# Patient Record
Sex: FEMALE | Race: WHITE | Employment: UNEMPLOYED | URBAN - METROPOLITAN AREA
[De-identification: names, ages, dates, MRNs, and addresses within clinical notes are randomized per-mention and may not be internally consistent; named-entity substitution may affect disease eponyms.]

---

## 2017-01-31 ENCOUNTER — HOSPITAL ENCOUNTER (EMERGENCY)
Age: 37
Discharge: HOME | End: 2017-01-31
Attending: EMERGENCY MEDICINE
Payer: COMMERCIAL

## 2017-01-31 VITALS
OXYGEN SATURATION: 100 % | RESPIRATION RATE: 16 BRPM | WEIGHT: 180 LBS | HEART RATE: 84 BPM | SYSTOLIC BLOOD PRESSURE: 121 MMHG | DIASTOLIC BLOOD PRESSURE: 72 MMHG | TEMPERATURE: 98 F | HEIGHT: 64 IN | BODY MASS INDEX: 30.73 KG/M2

## 2017-01-31 DIAGNOSIS — R10.13 ABDOMINAL PAIN, EPIGASTRIC: Primary | ICD-10-CM

## 2017-01-31 LAB
ALBUMIN SERPL BCP-MCNC: 3.1 G/DL (ref 3.5–5)
ALBUMIN/GLOB SERPL: 0.8 {RATIO} (ref 1.2–3.5)
ALP SERPL-CCNC: 63 U/L (ref 50–136)
ALT SERPL-CCNC: 17 U/L (ref 12–65)
ANION GAP BLD CALC-SCNC: 5 MMOL/L (ref 7–16)
AST SERPL W P-5'-P-CCNC: 17 U/L (ref 15–37)
BASOPHILS # BLD AUTO: 0 K/UL (ref 0–0.2)
BASOPHILS # BLD: 0 % (ref 0–2)
BILIRUB SERPL-MCNC: 0.2 MG/DL (ref 0.2–1.1)
BUN SERPL-MCNC: 7 MG/DL (ref 6–23)
CALCIUM SERPL-MCNC: 9 MG/DL (ref 8.3–10.4)
CHLORIDE SERPL-SCNC: 102 MMOL/L (ref 98–107)
CO2 SERPL-SCNC: 32 MMOL/L (ref 21–32)
CREAT SERPL-MCNC: 0.8 MG/DL (ref 0.6–1)
DIFFERENTIAL METHOD BLD: ABNORMAL
EOSINOPHIL # BLD: 0.1 K/UL (ref 0–0.8)
EOSINOPHIL NFR BLD: 1 % (ref 0.5–7.8)
ERYTHROCYTE [DISTWIDTH] IN BLOOD BY AUTOMATED COUNT: 14 % (ref 11.9–14.6)
GLOBULIN SER CALC-MCNC: 3.8 G/DL (ref 2.3–3.5)
GLUCOSE SERPL-MCNC: 79 MG/DL (ref 65–100)
HCT VFR BLD AUTO: 38.2 % (ref 35.8–46.3)
HGB BLD-MCNC: 11.8 G/DL (ref 11.7–15.4)
IMM GRANULOCYTES # BLD: 0 K/UL (ref 0–0.5)
IMM GRANULOCYTES NFR BLD AUTO: 0.2 % (ref 0–5)
LIPASE SERPL-CCNC: 66 U/L (ref 73–393)
LYMPHOCYTES # BLD AUTO: 28 % (ref 13–44)
LYMPHOCYTES # BLD: 2.9 K/UL (ref 0.5–4.6)
MCH RBC QN AUTO: 27.2 PG (ref 26.1–32.9)
MCHC RBC AUTO-ENTMCNC: 30.9 G/DL (ref 31.4–35)
MCV RBC AUTO: 88 FL (ref 79.6–97.8)
MONOCYTES # BLD: 0.6 K/UL (ref 0.1–1.3)
MONOCYTES NFR BLD AUTO: 6 % (ref 4–12)
NEUTS SEG # BLD: 6.8 K/UL (ref 1.7–8.2)
NEUTS SEG NFR BLD AUTO: 65 % (ref 43–78)
PLATELET # BLD AUTO: 270 K/UL (ref 150–450)
PMV BLD AUTO: 9.8 FL (ref 10.8–14.1)
POTASSIUM SERPL-SCNC: 3.5 MMOL/L (ref 3.5–5.1)
PROT SERPL-MCNC: 6.9 G/DL (ref 6.3–8.2)
RBC # BLD AUTO: 4.34 M/UL (ref 4.05–5.25)
SODIUM SERPL-SCNC: 139 MMOL/L (ref 136–145)
WBC # BLD AUTO: 10.5 K/UL (ref 4.3–11.1)

## 2017-01-31 PROCEDURE — 83690 ASSAY OF LIPASE: CPT | Performed by: EMERGENCY MEDICINE

## 2017-01-31 PROCEDURE — 96361 HYDRATE IV INFUSION ADD-ON: CPT | Performed by: EMERGENCY MEDICINE

## 2017-01-31 PROCEDURE — 81003 URINALYSIS AUTO W/O SCOPE: CPT | Performed by: EMERGENCY MEDICINE

## 2017-01-31 PROCEDURE — 99284 EMERGENCY DEPT VISIT MOD MDM: CPT | Performed by: EMERGENCY MEDICINE

## 2017-01-31 PROCEDURE — 74011250637 HC RX REV CODE- 250/637: Performed by: EMERGENCY MEDICINE

## 2017-01-31 PROCEDURE — 74011250636 HC RX REV CODE- 250/636: Performed by: EMERGENCY MEDICINE

## 2017-01-31 PROCEDURE — 85025 COMPLETE CBC W/AUTO DIFF WBC: CPT | Performed by: EMERGENCY MEDICINE

## 2017-01-31 PROCEDURE — 96374 THER/PROPH/DIAG INJ IV PUSH: CPT | Performed by: EMERGENCY MEDICINE

## 2017-01-31 PROCEDURE — 80053 COMPREHEN METABOLIC PANEL: CPT | Performed by: EMERGENCY MEDICINE

## 2017-01-31 PROCEDURE — 74011250636 HC RX REV CODE- 250/636

## 2017-01-31 PROCEDURE — 96376 TX/PRO/DX INJ SAME DRUG ADON: CPT | Performed by: EMERGENCY MEDICINE

## 2017-01-31 PROCEDURE — 96375 TX/PRO/DX INJ NEW DRUG ADDON: CPT | Performed by: EMERGENCY MEDICINE

## 2017-01-31 RX ORDER — HYDROMORPHONE HYDROCHLORIDE 1 MG/ML
1 INJECTION, SOLUTION INTRAMUSCULAR; INTRAVENOUS; SUBCUTANEOUS
Status: COMPLETED | OUTPATIENT
Start: 2017-01-31 | End: 2017-01-31

## 2017-01-31 RX ORDER — SODIUM CHLORIDE 9 MG/ML
1000 INJECTION, SOLUTION INTRAVENOUS ONCE
Status: COMPLETED | OUTPATIENT
Start: 2017-01-31 | End: 2017-01-31

## 2017-01-31 RX ORDER — ONDANSETRON 2 MG/ML
4 INJECTION INTRAMUSCULAR; INTRAVENOUS
Status: COMPLETED | OUTPATIENT
Start: 2017-01-31 | End: 2017-01-31

## 2017-01-31 RX ORDER — HYDROMORPHONE HYDROCHLORIDE 1 MG/ML
INJECTION, SOLUTION INTRAMUSCULAR; INTRAVENOUS; SUBCUTANEOUS
Status: DISCONTINUED
Start: 2017-01-31 | End: 2017-01-31 | Stop reason: HOSPADM

## 2017-01-31 RX ORDER — SODIUM CHLORIDE 0.9 % (FLUSH) 0.9 %
5-10 SYRINGE (ML) INJECTION EVERY 8 HOURS
Status: DISCONTINUED | OUTPATIENT
Start: 2017-01-31 | End: 2017-01-31 | Stop reason: HOSPADM

## 2017-01-31 RX ORDER — ONDANSETRON 8 MG/1
TABLET, ORALLY DISINTEGRATING ORAL
Status: DISCONTINUED
Start: 2017-01-31 | End: 2017-01-31 | Stop reason: HOSPADM

## 2017-01-31 RX ORDER — POTASSIUM CITRATE 15 MEQ/1
15 TABLET, EXTENDED RELEASE ORAL 2 TIMES DAILY
COMMUNITY

## 2017-01-31 RX ORDER — ONDANSETRON 8 MG/1
8 TABLET, ORALLY DISINTEGRATING ORAL
Status: COMPLETED | OUTPATIENT
Start: 2017-01-31 | End: 2017-01-31

## 2017-01-31 RX ORDER — HYDROMORPHONE HYDROCHLORIDE 1 MG/ML
2 INJECTION, SOLUTION INTRAMUSCULAR; INTRAVENOUS; SUBCUTANEOUS
Status: COMPLETED | OUTPATIENT
Start: 2017-01-31 | End: 2017-01-31

## 2017-01-31 RX ORDER — SODIUM CHLORIDE 0.9 % (FLUSH) 0.9 %
5-10 SYRINGE (ML) INJECTION AS NEEDED
Status: DISCONTINUED | OUTPATIENT
Start: 2017-01-31 | End: 2017-01-31 | Stop reason: HOSPADM

## 2017-01-31 RX ORDER — IBUPROFEN 200 MG
200 CAPSULE ORAL 3 TIMES DAILY
COMMUNITY

## 2017-01-31 RX ADMIN — SODIUM CHLORIDE 1000 ML: 900 INJECTION, SOLUTION INTRAVENOUS at 13:30

## 2017-01-31 RX ADMIN — ONDANSETRON 8 MG: 8 TABLET, ORALLY DISINTEGRATING ORAL at 15:50

## 2017-01-31 RX ADMIN — HYDROMORPHONE HYDROCHLORIDE 1 MG: 1 INJECTION, SOLUTION INTRAMUSCULAR; INTRAVENOUS; SUBCUTANEOUS at 15:50

## 2017-01-31 RX ADMIN — HYDROMORPHONE HYDROCHLORIDE 2 MG: 1 INJECTION, SOLUTION INTRAMUSCULAR; INTRAVENOUS; SUBCUTANEOUS at 13:30

## 2017-01-31 RX ADMIN — ONDANSETRON 4 MG: 2 INJECTION INTRAMUSCULAR; INTRAVENOUS at 13:30

## 2017-01-31 NOTE — DISCHARGE INSTRUCTIONS
Abdominal Pain: Care Instructions  Your Care Instructions    Abdominal pain has many possible causes. Some aren't serious and get better on their own in a few days. Others need more testing and treatment. If your pain continues or gets worse, you need to be rechecked and may need more tests to find out what is wrong. You may need surgery to correct the problem. Don't ignore new symptoms, such as fever, nausea and vomiting, urination problems, pain that gets worse, and dizziness. These may be signs of a more serious problem. Your doctor may have recommended a follow-up visit in the next 8 to 12 hours. If you are not getting better, you may need more tests or treatment. The doctor has checked you carefully, but problems can develop later. If you notice any problems or new symptoms, get medical treatment right away. Follow-up care is a key part of your treatment and safety. Be sure to make and go to all appointments, and call your doctor if you are having problems. It's also a good idea to know your test results and keep a list of the medicines you take. How can you care for yourself at home? · Rest until you feel better. · To prevent dehydration, drink plenty of fluids, enough so that your urine is light yellow or clear like water. Choose water and other caffeine-free clear liquids until you feel better. If you have kidney, heart, or liver disease and have to limit fluids, talk with your doctor before you increase the amount of fluids you drink. · If your stomach is upset, eat mild foods, such as rice, dry toast or crackers, bananas, and applesauce. Try eating several small meals instead of two or three large ones. · Wait until 48 hours after all symptoms have gone away before you have spicy foods, alcohol, and drinks that contain caffeine. · Do not eat foods that are high in fat. · Avoid anti-inflammatory medicines such as aspirin, ibuprofen (Advil, Motrin), and naproxen (Aleve).  These can cause stomach upset. Talk to your doctor if you take daily aspirin for another health problem. When should you call for help? Call 911 anytime you think you may need emergency care. For example, call if:  · You passed out (lost consciousness). · You pass maroon or very bloody stools. · You vomit blood or what looks like coffee grounds. · You have new, severe belly pain. Call your doctor now or seek immediate medical care if:  · Your pain gets worse, especially if it becomes focused in one area of your belly. · You have a new or higher fever. · Your stools are black and look like tar, or they have streaks of blood. · You have unexpected vaginal bleeding. · You have symptoms of a urinary tract infection. These may include:  ¨ Pain when you urinate. ¨ Urinating more often than usual.  ¨ Blood in your urine. · You are dizzy or lightheaded, or you feel like you may faint. Watch closely for changes in your health, and be sure to contact your doctor if:  · You are not getting better after 1 day (24 hours). Where can you learn more? Go to http://fidelLudesijill.info/. Enter R831 in the search box to learn more about \"Abdominal Pain: Care Instructions. \"  Current as of: May 27, 2016  Content Version: 11.1  © 0726-9791 [employer]. Care instructions adapted under license by [employer] (which disclaims liability or warranty for this information). If you have questions about a medical condition or this instruction, always ask your healthcare professional. [place] [address] any warranty or liability for your use of this information.   Recent Results (from the past 8 hour(s))   CBC WITH AUTOMATED DIFF    Collection Time: 01/31/17 12:40 PM   Result Value Ref Range    WBC 10.5 4.3 - 11.1 K/uL    RBC 4.34 4.05 - 5.25 M/uL    HGB 11.8 11.7 - 15.4 g/dL    HCT 38.2 35.8 - 46.3 %    MCV 88.0 79.6 - 97.8 FL    MCH 27.2 26.1 - 32.9 PG    MCHC 30.9 (L) 31.4 - 35.0 g/dL RDW 14.0 11.9 - 14.6 %    PLATELET 418 603 - 700 K/uL    MPV 9.8 (L) 10.8 - 14.1 FL    DF AUTOMATED      NEUTROPHILS 65 43 - 78 %    LYMPHOCYTES 28 13 - 44 %    MONOCYTES 6 4.0 - 12.0 %    EOSINOPHILS 1 0.5 - 7.8 %    BASOPHILS 0 0.0 - 2.0 %    IMMATURE GRANULOCYTES 0.2 0.0 - 5.0 %    ABS. NEUTROPHILS 6.8 1.7 - 8.2 K/UL    ABS. LYMPHOCYTES 2.9 0.5 - 4.6 K/UL    ABS. MONOCYTES 0.6 0.1 - 1.3 K/UL    ABS. EOSINOPHILS 0.1 0.0 - 0.8 K/UL    ABS. BASOPHILS 0.0 0.0 - 0.2 K/UL    ABS. IMM. GRANS. 0.0 0.0 - 0.5 K/UL   METABOLIC PANEL, COMPREHENSIVE    Collection Time: 01/31/17 12:40 PM   Result Value Ref Range    Sodium 139 136 - 145 mmol/L    Potassium 3.5 3.5 - 5.1 mmol/L    Chloride 102 98 - 107 mmol/L    CO2 32 21 - 32 mmol/L    Anion gap 5 (L) 7 - 16 mmol/L    Glucose 79 65 - 100 mg/dL    BUN 7 6 - 23 MG/DL    Creatinine 0.80 0.6 - 1.0 MG/DL    GFR est AA >60 >60 ml/min/1.73m2    GFR est non-AA >60 >60 ml/min/1.73m2    Calcium 9.0 8.3 - 10.4 MG/DL    Bilirubin, total 0.2 0.2 - 1.1 MG/DL    ALT 17 12 - 65 U/L    AST 17 15 - 37 U/L    Alk.  phosphatase 63 50 - 136 U/L    Protein, total 6.9 6.3 - 8.2 g/dL    Albumin 3.1 (L) 3.5 - 5.0 g/dL    Globulin 3.8 (H) 2.3 - 3.5 g/dL    A-G Ratio 0.8 (L) 1.2 - 3.5     LIPASE    Collection Time: 01/31/17 12:40 PM   Result Value Ref Range    Lipase 66 (L) 73 - 393 U/L

## 2017-01-31 NOTE — ED TRIAGE NOTES
Pt in c/o right upper quadrant pain x 4 days. Pt states history of chronic pancreatitis. Pt states vomiting and diarrhea. States chills. States unable to take pain medications at home due to vomiting.

## 2017-01-31 NOTE — ED PROVIDER NOTES
HPI Comments: 40 yo female presents with several days of abd pain, nausea/vomiting  Unable to keep down liquids  Unable to keep down her pain medicine    No alleviating  Getting worse with time    No fever    +n/v/vdiarrhea    Hx of chronic pain  Hx of chronic pancreatitis/abd pain    Patient is a 39 y.o. female presenting with abdominal pain. Abdominal Pain    Associated symptoms include diarrhea, nausea and vomiting. Past Medical History:   Diagnosis Date    Asthma      uses MDI as needed - rarely-pt can't remember last time needed    Avascular necrosis of femur head      right hip, left hip replacements    Chronic pain     Chronic pancreatitis ([facility] [address] 12/10/2011    Endocrine disease diagnosed 2003     ADDISONS DISEASE    Endometriosis      hysterectomy    Infectious disease 2007     MRSA-2007, community acquired - in [facility]) about 6 months - 3 months of which was in coma;  HPV-2009     Other ill-defined conditions(799.89)      allergies    Pancreatitis chronic      7 episodes    Unspecified adverse effect of anesthesia      pt reports \"difficult to sedate       Past Surgical History:   Procedure Laterality Date    Hx gyn       hysterectomy    Hx appendectomy      Hx cholecystectomy      Hx tonsillectomy      Hx other surgical  2007     I&D of 9 abcesses -MRSA    Hx orthopaedic  2010     right EDIN    Hx orthopaedic  2010     Left  EDIN    Hx shoulder replacement  12/2011     Right    Hx shoulder replacement  2015     left         Family History:   Problem Relation Age of Onset    Diabetes Maternal Grandmother     Stroke Maternal Grandfather     Heart Disease Paternal Grandmother        Social History     Social History    Marital status: SINGLE     Spouse name: N/A    Number of children: N/A    Years of education: N/A     Occupational History    Not on file.      Social History Main Topics    Smoking status: Never Smoker    Smokeless tobacco: Never Used    Alcohol use No    Drug use: No    Sexual activity: Not Currently     Other Topics Concern    Not on file     Social History Narrative         ALLERGIES: Latex; Peanut; Shellfish containing products; Beef containing products; Chicken derived; Chlorpromazine; Clindamycin; Dopamine receptor agonist; Gluten; Hydroxyzine pamoate; Ibuprofen; Iodine; Naproxen sodium; Nubain [nalbuphine]; Other medication; Pcn [penicillins]; Povidone-iodine; Prochlorperazine edisylate; Promethazine; Reglan [metoclopramide]; Soy; Sulfa (sulfonamide antibiotics); Toradol [ketorolac tromethamine]; Tramadol; and Zithromax [azithromycin]    Review of Systems   Constitutional: Negative. Respiratory: Negative. Negative for chest tightness. Gastrointestinal: Positive for abdominal pain, diarrhea, nausea and vomiting. Genitourinary: Negative. Musculoskeletal: Negative. Skin: Negative. Neurological: Negative. Psychiatric/Behavioral: Negative. Vitals:    01/31/17 1021   BP: 114/82   Pulse: 96   Resp: 16   Temp: 98.3 °F (36.8 °C)   SpO2: 98%   Weight: 81.6 kg (180 lb)   Height: 5' 4\" (1.626 m)            Physical Exam   Constitutional: She is oriented to person, place, and time. She appears well-developed and well-nourished. HENT:   Head: Normocephalic and atraumatic. Eyes: EOM are normal. Pupils are equal, round, and reactive to light. Cardiovascular: Normal rate and regular rhythm. Pulmonary/Chest: Breath sounds normal. No respiratory distress. She has no wheezes. Abdominal: Soft. She exhibits no distension. There is tenderness. There is no rebound and no guarding. Musculoskeletal: Normal range of motion. She exhibits no edema or tenderness. Neurological: She is alert and oriented to person, place, and time. Skin: Skin is warm and dry. Psychiatric: She has a normal mood and affect. Her behavior is normal.   Nursing note and vitals reviewed.        MDM  Number of Diagnoses or Management Options  Diagnosis management comments: 58-year-old female with history of chronic abdominal pain/chronic pancreatitis presents to the emergency Department nausea vomiting diarrhea. Patient's been able to keep her OxyContin and Dilaudid down having increasing pain    We'll give her some IV fluids. Pain medications. Observe.   Discharge           Amount and/or Complexity of Data Reviewed  Clinical lab tests: ordered    Risk of Complications, Morbidity, and/or Mortality  Presenting problems: moderate  Diagnostic procedures: minimal  Management options: high      ED Course   Comment By Time   Reviewed chart  Reviewed [place] records Cipriano Cardenas MD 01/31 1046       Procedures

## 2017-02-22 ENCOUNTER — SURGERY (OUTPATIENT)
Age: 37
End: 2017-02-22

## 2017-02-22 ENCOUNTER — HOSPITAL ENCOUNTER (OUTPATIENT)
Age: 37
Setting detail: OUTPATIENT SURGERY
Discharge: HOME | End: 2017-02-22
Attending: INTERNAL MEDICINE | Admitting: INTERNAL MEDICINE
Payer: COMMERCIAL

## 2017-02-22 ENCOUNTER — ANESTHESIA (OUTPATIENT)
Dept: ENDOSCOPY | Age: 37
End: 2017-02-22
Payer: COMMERCIAL

## 2017-02-22 ENCOUNTER — ANESTHESIA EVENT (OUTPATIENT)
Dept: ENDOSCOPY | Age: 37
End: 2017-02-22
Payer: COMMERCIAL

## 2017-02-22 VITALS
HEART RATE: 75 BPM | TEMPERATURE: 98.6 F | HEIGHT: 64 IN | RESPIRATION RATE: 16 BRPM | SYSTOLIC BLOOD PRESSURE: 115 MMHG | BODY MASS INDEX: 31.58 KG/M2 | DIASTOLIC BLOOD PRESSURE: 74 MMHG | WEIGHT: 185 LBS | OXYGEN SATURATION: 98 %

## 2017-02-22 PROCEDURE — 74011250636 HC RX REV CODE- 250/636: Performed by: ANESTHESIOLOGY

## 2017-02-22 PROCEDURE — 76060000031 HC ANESTHESIA FIRST 0.5 HR: Performed by: INTERNAL MEDICINE

## 2017-02-22 PROCEDURE — 76040000025: Performed by: INTERNAL MEDICINE

## 2017-02-22 PROCEDURE — 77030009426 HC FCPS BIOP ENDOSC BSC -B: Performed by: INTERNAL MEDICINE

## 2017-02-22 PROCEDURE — 74011250636 HC RX REV CODE- 250/636: Performed by: INTERNAL MEDICINE

## 2017-02-22 PROCEDURE — 74011250636 HC RX REV CODE- 250/636

## 2017-02-22 PROCEDURE — 88305 TISSUE EXAM BY PATHOLOGIST: CPT | Performed by: INTERNAL MEDICINE

## 2017-02-22 PROCEDURE — 88312 SPECIAL STAINS GROUP 1: CPT | Performed by: INTERNAL MEDICINE

## 2017-02-22 RX ORDER — HYDROMORPHONE HYDROCHLORIDE 1 MG/ML
2 INJECTION, SOLUTION INTRAMUSCULAR; INTRAVENOUS; SUBCUTANEOUS ONCE
Status: DISCONTINUED | OUTPATIENT
Start: 2017-02-22 | End: 2017-02-22 | Stop reason: HOSPADM

## 2017-02-22 RX ORDER — PROPOFOL 10 MG/ML
INJECTION, EMULSION INTRAVENOUS AS NEEDED
Status: DISCONTINUED | OUTPATIENT
Start: 2017-02-22 | End: 2017-02-22 | Stop reason: HOSPADM

## 2017-02-22 RX ORDER — SODIUM CHLORIDE, SODIUM LACTATE, POTASSIUM CHLORIDE, CALCIUM CHLORIDE 600; 310; 30; 20 MG/100ML; MG/100ML; MG/100ML; MG/100ML
100 INJECTION, SOLUTION INTRAVENOUS CONTINUOUS
Status: DISCONTINUED | OUTPATIENT
Start: 2017-02-22 | End: 2017-02-22 | Stop reason: HOSPADM

## 2017-02-22 RX ORDER — PROPOFOL 10 MG/ML
INJECTION, EMULSION INTRAVENOUS
Status: DISCONTINUED | OUTPATIENT
Start: 2017-02-22 | End: 2017-02-22 | Stop reason: HOSPADM

## 2017-02-22 RX ORDER — SODIUM CHLORIDE, SODIUM LACTATE, POTASSIUM CHLORIDE, CALCIUM CHLORIDE 600; 310; 30; 20 MG/100ML; MG/100ML; MG/100ML; MG/100ML
1000 INJECTION, SOLUTION INTRAVENOUS CONTINUOUS
Status: DISCONTINUED | OUTPATIENT
Start: 2017-02-22 | End: 2017-02-22 | Stop reason: HOSPADM

## 2017-02-22 RX ORDER — HYDROMORPHONE HYDROCHLORIDE 1 MG/ML
INJECTION, SOLUTION INTRAMUSCULAR; INTRAVENOUS; SUBCUTANEOUS
Status: COMPLETED
Start: 2017-02-22 | End: 2017-02-22

## 2017-02-22 RX ORDER — ONDANSETRON 2 MG/ML
4 INJECTION INTRAMUSCULAR; INTRAVENOUS ONCE
Status: COMPLETED | OUTPATIENT
Start: 2017-02-22 | End: 2017-02-22

## 2017-02-22 RX ORDER — OXYCODONE HYDROCHLORIDE 5 MG/1
5 TABLET ORAL
Status: CANCELLED | OUTPATIENT
Start: 2017-02-22

## 2017-02-22 RX ORDER — SODIUM CHLORIDE, SODIUM LACTATE, POTASSIUM CHLORIDE, CALCIUM CHLORIDE 600; 310; 30; 20 MG/100ML; MG/100ML; MG/100ML; MG/100ML
75 INJECTION, SOLUTION INTRAVENOUS CONTINUOUS
Status: CANCELLED | OUTPATIENT
Start: 2017-02-22

## 2017-02-22 RX ORDER — HYDROCODONE BITARTRATE AND ACETAMINOPHEN 5; 325 MG/1; MG/1
2 TABLET ORAL AS NEEDED
Status: CANCELLED | OUTPATIENT
Start: 2017-02-22

## 2017-02-22 RX ORDER — HYDROMORPHONE HYDROCHLORIDE 2 MG/ML
0.5 INJECTION, SOLUTION INTRAMUSCULAR; INTRAVENOUS; SUBCUTANEOUS
Status: CANCELLED | OUTPATIENT
Start: 2017-02-22

## 2017-02-22 RX ADMIN — PROPOFOL 130 MG: 10 INJECTION, EMULSION INTRAVENOUS at 09:06

## 2017-02-22 RX ADMIN — SODIUM CHLORIDE, SODIUM LACTATE, POTASSIUM CHLORIDE, AND CALCIUM CHLORIDE: 600; 310; 30; 20 INJECTION, SOLUTION INTRAVENOUS at 09:01

## 2017-02-22 RX ADMIN — SODIUM CHLORIDE, SODIUM LACTATE, POTASSIUM CHLORIDE, AND CALCIUM CHLORIDE 1000 ML: 600; 310; 30; 20 INJECTION, SOLUTION INTRAVENOUS at 08:06

## 2017-02-22 RX ADMIN — ONDANSETRON 4 MG: 2 INJECTION INTRAMUSCULAR; INTRAVENOUS at 09:36

## 2017-02-22 RX ADMIN — HYDROMORPHONE HYDROCHLORIDE 0.5 MG: 1 INJECTION, SOLUTION INTRAMUSCULAR; INTRAVENOUS; SUBCUTANEOUS at 08:52

## 2017-02-22 RX ADMIN — METHYLPREDNISOLONE SODIUM SUCCINATE 125 MG: 125 INJECTION, POWDER, FOR SOLUTION INTRAMUSCULAR; INTRAVENOUS at 09:18

## 2017-02-22 RX ADMIN — PROPOFOL 120 MCG/KG/MIN: 10 INJECTION, EMULSION INTRAVENOUS at 09:06

## 2017-02-22 NOTE — H&P
[facility] Consult Note           Referring Physician:     Consult Date: 2/22/2017    Reason for Consult: Epigastric pain    History of Present Illness:  Patient is a 39 y.o. female who is seen in consultation for epigastric pain. 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     Nausea & vomiting     Other ill-defined conditions(799.89)     allergies    Pancreatitis chronic     7 episodes    Unspecified adverse effect of anesthesia     pt reports \"difficult to sedate      Past Surgical History:   Procedure Laterality Date    HX APPENDECTOMY      HX CHOLECYSTECTOMY      HX GYN      hysterectomy    HX ORTHOPAEDIC  2010    right EDIN    HX ORTHOPAEDIC  2010    Left  EDIN    HX OTHER SURGICAL  2007    I&D of 9 abcesses -MRSA    HX SHOULDER REPLACEMENT  12/2011    Right    HX SHOULDER REPLACEMENT  2015    left    HX TONSILLECTOMY        Family History   Problem Relation Age of Onset    Cancer Mother      THROID CANCER    Diabetes Maternal Grandmother     Stroke Maternal Grandfather     Heart Disease Paternal Grandmother      Social History     Occupational History    Not on file.      Social History Main Topics    Smoking status: Never Smoker    Smokeless tobacco: Never Used    Alcohol use No    Drug use: No    Sexual activity: Not Currently       Hospital Medications:  Current Facility-Administered Medications   Medication Dose Route Frequency    lactated ringers infusion 1,000 mL 1,000 mL IntraVENous CONTINUOUS       Allergies: Allergies   Allergen Reactions    Latex Anaphylaxis    Peanut Anaphylaxis    Shellfish Containing Products Anaphylaxis    Beef Containing Products Other (comments)     Per allergy testing    Chicken Derived Other (comments)     Per allergy testing    Chlorpromazine Other (comments)     Dystonic      Clindamycin Rash    Dopamine Receptor Agonist Other (comments)     Dystonic      Gluten Other (comments)     Per allergy testing    Hydroxyzine Pamoate Other (comments)     DYSTONIC      Ibuprofen Rash and Photosensitivity    Iodine Rash    Naproxen Sodium Rash    Nubain [Nalbuphine] Rash    Other Medication Other (comments)     Bananas, avacodo, strawberry, potatoes  --- ALL PER ALLERGY TESTING    Pcn [Penicillins] Rash    Povidone-Iodine Rash    Prochlorperazine Edisylate Other (comments)     Dystonic      Promethazine Other (comments)     Dystonic      Reglan [Metoclopramide] Anaphylaxis     dystonia    Soy Other (comments)     Per allergy testing    Sulfa (Sulfonamide Antibiotics) Rash    Toradol [Ketorolac Tromethamine] Rash    Tramadol Rash    Zithromax [Azithromycin] Rash       Review of Systems:  A comprehensive review of systems was negative except for that written in the History of Present Illness. Objective:     Physical Exam:  Vitals:  Visit Vitals    /85    Pulse 79    Temp 98.3 °F (36.8 °C)    Resp 14    Ht 5' 4\" (1.626 m)    Wt 83.9 kg (185 lb)    SpO2 100%    Breastfeeding No    BMI 31.76 kg/m2       General: No acute distress. Skin:  Extremities and face reveal no rashes. No jacob erythema. No telangiectasias on the chest wall. HEENT: Sclerae anicteric. No oral ulcers. No abnormal pigmentation of the lips. The neck is supple. Cardiovascular: Regular rate and rhythm. No murmurs, gallops, or rubs. Respiratory:  Comfortable breathing  With no accessory muscle use.  Clear breath sounds with no wheezes, rales, or rhonchi. GI:  Abdomen nondistended, soft, and nontender. Normal active bowel sounds. No enlargement of the liver or spleen. No masses palpable. Musculoskeletal:  No pitting edema of the lower legs. Extremities have good range of motion. Neurological:  Gross memory appears intact. Patient is alert and oriented. Psychiatric:  Mood appears appropriate with judgement intact. Lymphatic:  No cervical or supraclavicular adenopathy. Laboratory:    No results for input(s): WBC, RBC, HGB, HCT, PLT, HGBEXT, HCTEXT, PLTEXT in the last 72 hours. No results for input(s): GLU, NA, K, CL, CO2, BUN, CREA, CA in the last 72 hours. No results for input(s): PTP, INR, APTT in the last 72 hours. No lab exists for component: INREXT  No results for input(s): TBIL, CBIL, SGOT, GPT, AP, ALB, TP, AML, LPSE in the last 72 hours.     Assessment:       A 39 y.o. female with epigastric pain      Plan:       EGD    Signed By: Yesica Miranda MD     February 22, 2017

## 2017-02-22 NOTE — IP AVS SNAPSHOT
Deepak Ayala 
 
 
 [address] 
[phone] Patient: Jacques Barrow MRN: [id number] :1980 You are allergic to the following Allergen Reactions Latex Anaphylaxis Peanut Anaphylaxis Shellfish Containing Products Anaphylaxis Beef Containing Products Other (comments) Per allergy testing Chicken Derived Other (comments) Per allergy testing Chlorpromazine Other (comments) Dystonic Clindamycin Rash Dopamine Receptor Agonist Other (comments) Dystonic Gluten Other (comments) Per allergy testing Hydroxyzine Pamoate Other (comments) DYSTONIC Ibuprofen Rash Photosensitivity Iodine Rash Naproxen Sodium Rash Nubain (Nalbuphine) Rash Other Medication Other (comments) Bananas, avacodo, strawberry, potatoes  --- ALL PER ALLERGY TESTING Pcn (Penicillins) Rash Povidone-Iodine Rash Prochlorperazine Edisylate Other (comments) Dystonic Promethazine Other (comments) Dystonic Reglan (Metoclopramide) Anaphylaxis  
 dystonia Soy Other (comments) Per allergy testing Sulfa (Sulfonamide Antibiotics) Rash Toradol (Ketorolac Tromethamine) Rash Tramadol Rash Zithromax (Azithromycin) Rash Recent Documentation Height  
  
  
  
  
  
 1.626 m Emergency Contacts Name Discharge Info Relation Home Work Mobile [address]  Father [15] [phone] [address] CAREGIVER [3] Mother [14] [phone] About your hospitalization You were admitted on:  2017 You last received care in the:  [facility] You were discharged on:  2017 Unit phone number:  [phone] Why you were hospitalized Your primary diagnosis was:  Not on File Providers Seen During Your Hospitalizations Provider Role Specialty Primary office phone Melyssa Herman MD Attending Provider Gastroenterology [phone] Your Primary Care Physician (PCP) Primary Care Physician Office Phone Office Fax Stuart Solis [phone] Follow-up Information None Current Discharge Medication List  
  
ASK your doctor about these medications Dose & Instructions Dispensing Information Comments Morning Noon Evening Bedtime  
 albuterol 90 mcg/actuation inhaler Commonly known as:  Rc Villagomez Your next dose is: Today, Tomorrow Other:  _________ Dose:  2 Puff Take 2 Puffs by inhalation as needed. Refills:  0  
     
   
   
   
  
 calcium citrate 200 mg (950 mg) tablet Your next dose is: Today, Tomorrow Other:  _________ Dose:  200 mg Take 200 mg by mouth three (3) times daily. Refills:  0  
     
   
   
   
  
 * CORTEF 10 mg tablet Generic drug:  hydrocortisone Your next dose is: Today, Tomorrow Other:  _________ Dose:  10 mg Take 10 mg by mouth every morning. Refills:  0  
     
   
   
   
  
 * CORTEF 5 mg tablet Generic drug:  hydrocortisone Your next dose is: Today, Tomorrow Other:  _________ Dose:  5 mg Take 5 mg by mouth nightly. Refills:  0  
     
   
   
   
  
 DILAUDID 4 mg tablet Generic drug:  HYDROmorphone Your next dose is: Today, Tomorrow Other:  _________ Take  by mouth every three (3) hours as needed for Pain. Refills:  0 EPINEPHrine 0.15 mg/0.15 mL injection Commonly known as:  AUVI-Q Your next dose is: Today, Tomorrow Other:  _________  
   
   
 by IntraMUSCular route as needed. Epi-pen as needed for allergic reaction Refills:  0 ESTRADIOL PO Your next dose is: Today, Tomorrow Other:  _________ Take  by mouth daily. Refills:  0 FERROUS SULFATE PO Your next dose is: Today, Tomorrow Other:  _________ Dose:  1 Tab Take 1 Tab by mouth daily. Refills:  0  
     
   
   
   
  
 LASIX 40 mg tablet Generic drug:  furosemide Your next dose is: Today, Tomorrow Other:  _________ Dose:  40 mg Take 40 mg by mouth daily. Refills:  0  
     
   
   
   
  
 omeprazole 20 mg capsule Commonly known as:  PRILOSEC Your next dose is: Today, Tomorrow Other:  _________ Dose:  20 mg Take 20 mg by mouth two (2) times a day. Refills:  0  
     
   
   
   
  
 oxyCODONE CR 40 mg CR tablet Commonly known as:  OxyCONTIN Your next dose is: Today, Tomorrow Other:  _________ Dose:  40 mg Take 40 mg by mouth every twelve (12) hours. Indications: CHRONIC PAIN Refills:  0 Potassium Citrate 15 mEq Cady Melinda Your next dose is: Today, Tomorrow Other:  _________ Dose:  15 mEq Take 15 mEq by mouth two (2) times a day. Refills:  0 PROBIOTIC 4X 10-15 mg Tbec Generic drug:  B.infantis-B.ani-B.long-B.bifi Your next dose is: Today, Tomorrow Other:  _________ Dose:  1 Tab Take 1 Tab by mouth every morning. Refills:  0 SINGULAIR 10 mg tablet Generic drug:  montelukast  
   
Your next dose is: Today, Tomorrow Other:  _________ Dose:  10 mg Take 10 mg by mouth every morning. Refills:  0 ZENPEP capsule Generic drug:  lipase-protease-amylase Your next dose is: Today, Tomorrow Other:  _________ Dose:  1 Cap Take 1 Cap by mouth Before breakfast, lunch, dinner and at bedtime. Refills:  0 ZOFRAN (AS HYDROCHLORIDE) 8 mg tablet Generic drug:  ondansetron hcl Your next dose is: Today, Tomorrow Other:  _________ Dose:  8 mg Take 8 mg by mouth as needed. Refills:  0  
     
   
   
   
  
 * Notice: This list has 2 medication(s) that are the same as other medications prescribed for you. Read the directions carefully, and ask your doctor or other care provider to review them with you. Discharge Instructions Gastrointestinal Esophagogastroduodenoscopy (EGD) - Upper Exam Discharge Instructions 1. Call Dr. Sylvie Liu for any problems or questions. 2. Contact the doctor's office for follow up appointment as directed. 3. Medication may cause drowsiness for several hours, therefore, do not drive or operate machinery for remainder of the day. 4. No alcohol today. 5. Ordinarily, you may resume regular diet and activity after exam unless otherwise specified by your physician. 6. For mild soreness in your throat you may use Cepacol throat lozenges or warm salt-water gargles as needed. Any additional instructions: * Follow up in 1-2 months Instructions given to Da Srivastava and other family members. Instructions given by:  Dr. Sylvie Liu Discharge Orders None Introducing [facility]! [facility] introduces Our[employer] patient portal. Now you can access parts of your medical record, email your doctor's office, and request medication refills online. 1. In your internet browser, go to [email or web address] 2. Click on the First Time User? Click Here link in the Sign In box. You will see the New Member Sign Up page. 3. Enter your [employer] Access Code exactly as it appears below. You will not need to use this code after youve completed the sign-up process. If you do not sign up before the expiration date, you must request a new code. · Our[employer] Access Code: [email or web address] Expires: 5/1/2017 10:06 AM 
 
4. Enter the last four digits of your Social Security Number (xxxx) and Date of Birth (mm/dd/yyyy) as indicated and click Submit. You will be taken to the next sign-up page. 5. Create a [employer] ID. This will be your [employer] login ID and cannot be changed, so think of one that is secure and easy to remember. 6. Create a [employer] password. You can change your password at any time. 7. Enter your Password Reset Question and Answer. This can be used at a later time if you forget your password. 8. Enter your e-mail address. You will receive e-mail notification when new information is available in [address] 9. Click Sign Up. You can now view and download portions of your medical record. 10. Click the Download Summary menu link to download a portable copy of your medical information. If you have questions, please visit the Frequently Asked Questions section of the [employer] website. Remember, [employer] is NOT to be used for urgent needs. For medical emergencies, dial 911. Now available from your iPhone and Android! General Information Please provide this summary of care documentation to your next provider. Patient Signature:  ____________________________________________________________ Date:  ____________________________________________________________  
  
Yecenia Anderson Provider Signature:  ____________________________________________________________ Date:  ____________________________________________________________

## 2017-02-22 NOTE — ROUTINE PROCESS
Patient discharged via wheelchair. VSS on room air. No complaints of pain or discomfort. Spoke with Dr. Brigido Raman at bedside. Anesthesia aware of discharge. Discharge instructions given to patient and family.

## 2017-02-22 NOTE — ANESTHESIA POSTPROCEDURE EVALUATION
Post-Anesthesia Evaluation and Assessment    Patient: Ton Stoll MRN: [id number]  SSN: [id number]    YOB: 1980  Age: 39 y.o. Sex: female       Cardiovascular Function/Vital Signs  Visit Vitals    /74    Pulse 68    Temp 37 °C (98.6 °F)    Resp 16    Ht 5' 4\" (1.626 m)    Wt 83.9 kg (185 lb)    SpO2 96%    Breastfeeding No    BMI 31.76 kg/m2       Patient is status post total IV anesthesia anesthesia for Procedure(s):  ESOPHAGOGASTRODUODENOSCOPY (EGD)/ bmi=33  ESOPHAGOGASTRODUODENAL (EGD) BIOPSY. Nausea/Vomiting: None    Postoperative hydration reviewed and adequate. Pain:  Pain Scale 1: Numeric (0 - 10) (02/22/17 0759)  Pain Intensity 1: 9 (02/22/17 0759)   Managed    Neurological Status: At baseline    Mental Status and Level of Consciousness: Arousable    Pulmonary Status:   O2 Device: Room air (02/22/17 2191)   Adequate oxygenation and airway patent    Complications related to anesthesia: None    Post-anesthesia assessment completed.  No concerns    Signed By: Nic Kerr MD     February 22, 2017

## 2017-02-22 NOTE — DISCHARGE INSTRUCTIONS
Gastrointestinal Esophagogastroduodenoscopy (EGD) - Upper Exam Discharge Instructions    1. Call Dr. Maci Marc for any problems or questions. 2. Contact the doctor's office for follow up appointment as directed. 3. Medication may cause drowsiness for several hours, therefore, do not drive or operate machinery for remainder of the day. 4. No alcohol today. 5. Ordinarily, you may resume regular diet and activity after exam unless otherwise specified by your physician. 6. For mild soreness in your throat you may use Cepacol throat lozenges or warm salt-water gargles as needed. Any additional instructions:    * Follow up in 1-2 months     Instructions given to [facility] and other family members.   Instructions given by:  Dr. Maci Marc

## 2017-02-22 NOTE — PROGRESS NOTES
Pt complaining of increased pain and nausea in GI recovery. Anesthesia notified and new order received for Zofran 4 mg IV once.

## 2017-02-22 NOTE — ANESTHESIA PREPROCEDURE EVALUATION
Anesthetic History   No history of anesthetic complications            Review of Systems / Medical History  Patient summary reviewed and pertinent labs reviewed    Pulmonary            Asthma : well controlled       Neuro/Psych   Within defined limits           Cardiovascular                  Exercise tolerance: <4 METS     GI/Hepatic/Renal     GERD: well controlled          Comments: Chronic pancreatitis Endo/Other        Obesity    Comments: Roney's Disease Other Findings   Comments: Endometriosis  Avascular vecrosis of femur  Addisons  MRSA   Chronic pain  Chronic pancreatitis           Physical Exam    Airway  Mallampati: II  TM Distance: 4 - 6 cm  Neck ROM: normal range of motion   Mouth opening: Normal     Cardiovascular    Rhythm: regular  Rate: normal         Dental    Dentition: Full lower dentures and Full upper dentures     Pulmonary  Breath sounds clear to auscultation               Abdominal  GI exam deferred      Comments: flushed Other Findings            Anesthetic Plan    ASA: 3  Anesthesia type: total IV anesthesia          Induction: Intravenous  Anesthetic plan and risks discussed with: Patient and Mother        Asking for IV dilaudid repeatedly. Insists on steroid stress dose. Will order steroid.

## 2017-02-22 NOTE — PROCEDURES
Endoscopic Gastroduodenoscopy Procedure Note    Indications: Epigastric pain    Anesthesia/Sedation: MAC IV     Pre-Procedure Physical:    Current Facility-Administered Medications   Medication Dose Route Frequency    lactated ringers infusion 1,000 mL  1,000 mL IntraVENous CONTINUOUS      Latex; Peanut; Shellfish containing products; Beef containing products; Chicken derived; Chlorpromazine; Clindamycin; Dopamine receptor agonist; Gluten; Hydroxyzine pamoate; Ibuprofen; Iodine; Naproxen sodium; Nubain [nalbuphine]; Other medication; Pcn [penicillins]; Povidone-iodine; Prochlorperazine edisylate; Promethazine; Reglan [metoclopramide]; Soy; Sulfa (sulfonamide antibiotics); Toradol [ketorolac tromethamine]; Tramadol; and Zithromax [azithromycin]    Patient Vitals for the past 8 hrs:   BP Temp Pulse Resp SpO2 Height Weight   02/22/17 0759 129/85 - 79 14 100 % - -   02/22/17 0741 - 98.3 °F (36.8 °C) - - - 5' 4\" (1.626 m) 83.9 kg (185 lb)       Exam      Airway: clear   Heart: normal S1and S2    Lungs: clear bilateral  Abdomen: soft, nontender, bowel sounds present and normal in all quads   Mental Status: awake, alert and oriented to person, place and time          Procedure Details     Informed consent was obtained for the procedure, including conscious sedation. Risks of pancreatitis, infection, perforation, hemorrhage, adverse drug reaction and aspiration were discussed. The patient was placed in the left lateral decubitus position. Based on the pre-procedure assessment, including review of the patient's medical history, medications, allergies, and review of systems, she had been deemed to be an appropriate candidate for conscious sedation; she was therefore sedated with the medications listed below. She was monitored continuously with ECG tracing, pulse oximetry, blood pressure monitoring, and direct observation.       The EGD gastroscope was inserted into the mouth and advanced under direct vision to the second portion of the duodenum. A careful inspection was made as the gastroscope was withdrawn, including a retroflexed view of the proximal stomach; findings and interventions are described below. Appropriate photodocumentation was obtained. Findings:   Esophagus- Normal.  Stomach- Normal.  Biopsied. Duodenum- Normal.    Therapies: Biopsy    Specimens: Gastric    Estimated Blood Loss: 0 cc           Complications:   None; patient tolerated the procedure well. Attending Attestation:  I performed the procedure. Impression:    Normal EGD. Random gastric biopsies taken. Recommendations: Will obtain EUS report from Dr. Emelia Duong in [place]. Will see back in office in 1-2 months.

## 2017-05-04 ENCOUNTER — HOSPITAL ENCOUNTER (OUTPATIENT)
Dept: NUCLEAR MEDICINE | Age: 37
Discharge: HOME | End: 2017-05-04
Attending: INTERNAL MEDICINE
Payer: COMMERCIAL

## 2017-05-04 DIAGNOSIS — R11.2 NAUSEA & VOMITING: ICD-10-CM

## 2017-05-04 PROCEDURE — 78264 GASTRIC EMPTYING IMG STUDY: CPT

## 2017-08-19 ENCOUNTER — HOSPITAL ENCOUNTER (EMERGENCY)
Age: 37
Discharge: HOME | End: 2017-08-19
Attending: EMERGENCY MEDICINE
Payer: COMMERCIAL

## 2017-08-19 VITALS
WEIGHT: 170 LBS | RESPIRATION RATE: 16 BRPM | HEIGHT: 64 IN | SYSTOLIC BLOOD PRESSURE: 113 MMHG | OXYGEN SATURATION: 96 % | DIASTOLIC BLOOD PRESSURE: 75 MMHG | TEMPERATURE: 98.2 F | BODY MASS INDEX: 29.02 KG/M2 | HEART RATE: 68 BPM

## 2017-08-19 DIAGNOSIS — G89.29 CHRONIC ABDOMINAL PAIN: Primary | ICD-10-CM

## 2017-08-19 DIAGNOSIS — R10.9 CHRONIC ABDOMINAL PAIN: Primary | ICD-10-CM

## 2017-08-19 DIAGNOSIS — B37.31 YEAST VAGINITIS: ICD-10-CM

## 2017-08-19 LAB
ALBUMIN SERPL-MCNC: 3.4 G/DL (ref 3.5–5)
ALBUMIN/GLOB SERPL: 0.9 {RATIO} (ref 1.2–3.5)
ALP SERPL-CCNC: 56 U/L (ref 50–136)
ALT SERPL-CCNC: 22 U/L (ref 12–65)
ANION GAP SERPL CALC-SCNC: 12 MMOL/L (ref 7–16)
AST SERPL-CCNC: 15 U/L (ref 15–37)
BASOPHILS # BLD: 0 K/UL (ref 0–0.2)
BASOPHILS NFR BLD: 0 % (ref 0–2)
BILIRUB DIRECT SERPL-MCNC: 0.1 MG/DL
BILIRUB SERPL-MCNC: 0.3 MG/DL (ref 0.2–1.1)
BUN SERPL-MCNC: 8 MG/DL (ref 6–23)
CALCIUM SERPL-MCNC: 9.4 MG/DL (ref 8.3–10.4)
CHLORIDE SERPL-SCNC: 100 MMOL/L (ref 98–107)
CO2 SERPL-SCNC: 27 MMOL/L (ref 21–32)
CREAT SERPL-MCNC: 0.84 MG/DL (ref 0.6–1)
DIFFERENTIAL METHOD BLD: ABNORMAL
EOSINOPHIL # BLD: 0.1 K/UL (ref 0–0.8)
EOSINOPHIL NFR BLD: 1 % (ref 0.5–7.8)
ERYTHROCYTE [DISTWIDTH] IN BLOOD BY AUTOMATED COUNT: 13.4 % (ref 11.9–14.6)
GLOBULIN SER CALC-MCNC: 3.6 G/DL (ref 2.3–3.5)
GLUCOSE SERPL-MCNC: 103 MG/DL (ref 65–100)
HCG UR QL: NEGATIVE
HCT VFR BLD AUTO: 40.8 % (ref 35.8–46.3)
HGB BLD-MCNC: 12.9 G/DL (ref 11.7–15.4)
IMM GRANULOCYTES # BLD: 0 K/UL (ref 0–0.5)
IMM GRANULOCYTES NFR BLD: 0.2 % (ref 0–5)
LIPASE SERPL-CCNC: 68 U/L (ref 73–393)
LYMPHOCYTES # BLD: 2 K/UL (ref 0.5–4.6)
LYMPHOCYTES NFR BLD: 23 % (ref 13–44)
MCH RBC QN AUTO: 27.6 PG (ref 26.1–32.9)
MCHC RBC AUTO-ENTMCNC: 31.6 G/DL (ref 31.4–35)
MCV RBC AUTO: 87.4 FL (ref 79.6–97.8)
MONOCYTES # BLD: 0.6 K/UL (ref 0.1–1.3)
MONOCYTES NFR BLD: 7 % (ref 4–12)
NEUTS SEG # BLD: 5.9 K/UL (ref 1.7–8.2)
NEUTS SEG NFR BLD: 69 % (ref 43–78)
PLATELET # BLD AUTO: 351 K/UL (ref 150–450)
PMV BLD AUTO: 9.3 FL (ref 10.8–14.1)
POTASSIUM SERPL-SCNC: 3.8 MMOL/L (ref 3.5–5.1)
PROT SERPL-MCNC: 7 G/DL (ref 6.3–8.2)
RBC # BLD AUTO: 4.67 M/UL (ref 4.05–5.25)
SERVICE CMNT-IMP: NORMAL
SODIUM SERPL-SCNC: 139 MMOL/L (ref 136–145)
WBC # BLD AUTO: 8.6 K/UL (ref 4.3–11.1)
WET PREP GENITAL: NORMAL

## 2017-08-19 PROCEDURE — 96375 TX/PRO/DX INJ NEW DRUG ADDON: CPT | Performed by: EMERGENCY MEDICINE

## 2017-08-19 PROCEDURE — 96361 HYDRATE IV INFUSION ADD-ON: CPT | Performed by: EMERGENCY MEDICINE

## 2017-08-19 PROCEDURE — 83690 ASSAY OF LIPASE: CPT | Performed by: EMERGENCY MEDICINE

## 2017-08-19 PROCEDURE — 81003 URINALYSIS AUTO W/O SCOPE: CPT | Performed by: EMERGENCY MEDICINE

## 2017-08-19 PROCEDURE — 99284 EMERGENCY DEPT VISIT MOD MDM: CPT | Performed by: EMERGENCY MEDICINE

## 2017-08-19 PROCEDURE — 74011250636 HC RX REV CODE- 250/636: Performed by: EMERGENCY MEDICINE

## 2017-08-19 PROCEDURE — 80076 HEPATIC FUNCTION PANEL: CPT | Performed by: EMERGENCY MEDICINE

## 2017-08-19 PROCEDURE — 96374 THER/PROPH/DIAG INJ IV PUSH: CPT | Performed by: EMERGENCY MEDICINE

## 2017-08-19 PROCEDURE — 85025 COMPLETE CBC W/AUTO DIFF WBC: CPT | Performed by: EMERGENCY MEDICINE

## 2017-08-19 PROCEDURE — 80048 BASIC METABOLIC PNL TOTAL CA: CPT | Performed by: EMERGENCY MEDICINE

## 2017-08-19 PROCEDURE — 81025 URINE PREGNANCY TEST: CPT

## 2017-08-19 PROCEDURE — 87210 SMEAR WET MOUNT SALINE/INK: CPT | Performed by: EMERGENCY MEDICINE

## 2017-08-19 PROCEDURE — 87491 CHLMYD TRACH DNA AMP PROBE: CPT | Performed by: EMERGENCY MEDICINE

## 2017-08-19 RX ORDER — SODIUM CHLORIDE, SODIUM LACTATE, POTASSIUM CHLORIDE, CALCIUM CHLORIDE 600; 310; 30; 20 MG/100ML; MG/100ML; MG/100ML; MG/100ML
150 INJECTION, SOLUTION INTRAVENOUS CONTINUOUS
Status: DISCONTINUED | OUTPATIENT
Start: 2017-08-19 | End: 2017-08-19 | Stop reason: HOSPADM

## 2017-08-19 RX ORDER — MICONAZOLE NITRATE 2 %
1 CREAM WITH APPLICATOR VAGINAL
Qty: 45 G | Refills: 0 | Status: SHIPPED | OUTPATIENT
Start: 2017-08-19 | End: 2017-08-24

## 2017-08-19 RX ORDER — HYOSCYAMINE SULFATE 0.12 MG/1
0.12 TABLET SUBLINGUAL
Status: DISCONTINUED | OUTPATIENT
Start: 2017-08-19 | End: 2017-08-19

## 2017-08-19 RX ORDER — SODIUM CHLORIDE 0.9 % (FLUSH) 0.9 %
5-10 SYRINGE (ML) INJECTION AS NEEDED
Status: DISCONTINUED | OUTPATIENT
Start: 2017-08-19 | End: 2017-08-19 | Stop reason: HOSPADM

## 2017-08-19 RX ORDER — DICYCLOMINE HYDROCHLORIDE 10 MG/1
20 CAPSULE ORAL
Status: DISCONTINUED | OUTPATIENT
Start: 2017-08-19 | End: 2017-08-19 | Stop reason: HOSPADM

## 2017-08-19 RX ORDER — SODIUM CHLORIDE 0.9 % (FLUSH) 0.9 %
5-10 SYRINGE (ML) INJECTION EVERY 8 HOURS
Status: DISCONTINUED | OUTPATIENT
Start: 2017-08-19 | End: 2017-08-19 | Stop reason: HOSPADM

## 2017-08-19 RX ORDER — ONDANSETRON 2 MG/ML
4 INJECTION INTRAMUSCULAR; INTRAVENOUS
Status: COMPLETED | OUTPATIENT
Start: 2017-08-19 | End: 2017-08-19

## 2017-08-19 RX ADMIN — SODIUM CHLORIDE, SODIUM LACTATE, POTASSIUM CHLORIDE, AND CALCIUM CHLORIDE 150 ML/HR: 600; 310; 30; 20 INJECTION, SOLUTION INTRAVENOUS at 11:20

## 2017-08-19 RX ADMIN — ONDANSETRON 4 MG: 2 INJECTION INTRAMUSCULAR; INTRAVENOUS at 11:21

## 2017-08-19 RX ADMIN — METHYLPREDNISOLONE SODIUM SUCCINATE 125 MG: 125 INJECTION, POWDER, FOR SOLUTION INTRAMUSCULAR; INTRAVENOUS at 14:43

## 2017-08-19 NOTE — DISCHARGE INSTRUCTIONS
Vaginal Yeast Infection: Care Instructions  Your Care Instructions  A vaginal yeast infection is caused by too many yeast cells in the vagina. This is common in women of all ages. Itching, vaginal discharge and irritation, and other symptoms can bother you. But yeast infections don't often cause other health problems. Some medicines can increase your risk of getting a yeast infection. These include antibiotics, birth control pills, hormones, and steroids. You may also be more likely to get a yeast infection if you are pregnant, have diabetes, douche, or wear tight clothes. With treatment, most yeast infections get better in 2 to 3 days. Follow-up care is a key part of your treatment and safety. Be sure to make and go to all appointments, and call your doctor if you are having problems. It's also a good idea to know your test results and keep a list of the medicines you take. How can you care for yourself at home? · Take your medicines exactly as prescribed. Call your doctor if you think you are having a problem with your medicine. · Ask your doctor about over-the-counter (OTC) medicines for yeast infections. They may cost less than prescription medicines. If you use an OTC treatment, read and follow all instructions on the label. · Do not use tampons while using a vaginal cream or suppository. The tampons can absorb the medicine. Use pads instead. · Wear loose cotton clothing. Do not wear nylon or other fabric that holds body heat and moisture close to the skin. · Try sleeping without underwear. · Do not scratch. Relieve itching with a cold pack or a cool bath. · Do not wash your vaginal area more than once a day. Use plain water or a mild, unscented soap. Air-dry the vaginal area. · Change out of wet swimsuits after swimming. · Do not have sex until you have finished your treatment. · Do not douche. When should you call for help?   Call your doctor now or seek immediate medical care if:  · You have unexpected vaginal bleeding. · You have new or increased pain in your vagina or pelvis. Watch closely for changes in your health, and be sure to contact your doctor if:  · You have a fever. · You are not getting better after 2 days. · Your symptoms come back after you finish your medicines. Where can you learn more? Go to http://fidel-jill.info/. Enter A371 in the search box to learn more about \"Vaginal Yeast Infection: Care Instructions. \"  Current as of: October 13, 2016  Content Version: 11.3  © 4422-2257 [employer]. Care instructions adapted under license by [employer] (which disclaims liability or warranty for this information). If you have questions about a medical condition or this instruction, always ask your healthcare professional. [address] any warranty or liability for your use of this information. Chronic Pain: Care Instructions  Your Care Instructions  Chronic pain is pain that lasts a long time (months or even years) and may or may not have a clear cause. It is different from acute pain, which usually does have a clear cause--like an injury or illness--and gets better over time. Chronic pain:  · Lasts over time but may vary from day to day. · Does not go away despite efforts to end it. · May disrupt your sleep and lead to fatigue. · May cause depression or anxiety. · May make your muscles tense, causing more pain. · Can disrupt your work, hobbies, home life, and relationships with friends and family. Chronic pain is a very real condition. It is not just in your head. Treatment can help and usually includes several methods used together, such as medicines, physical therapy, exercise, and other treatments. Learning how to relax and changing negative thought patterns can also help you cope. Chronic pain is complex. Taking an active role in your treatment will help you better manage your pain.  Tell your doctor if you have trouble dealing with your pain. You may have to try several things before you find what works best for you. Follow-up care is a key part of your treatment and safety. Be sure to make and go to all appointments, and call your doctor if you are having problems. Its also a good idea to know your test results and keep a list of the medicines you take. How can you care for yourself at home? · Pace yourself. Break up large jobs into smaller tasks. Save harder tasks for days when you have less pain, or go back and forth between hard tasks and easier ones. Take rest breaks. · Relax, and reduce stress. Relaxation techniques such as deep breathing or meditation can help. · Keep moving. Gentle, daily exercise can help reduce pain over the long run. Try low- or no-impact exercises such as walking, swimming, and stationary biking. Do stretches to stay flexible. · Try heat, cold packs, and massage. · Get enough sleep. Chronic pain can make you tired and drain your energy. Talk with your doctor if you have trouble sleeping because of pain. · Think positive. Your thoughts can affect your pain level. Do things that you enjoy to distract yourself when you have pain instead of focusing on the pain. See a movie, read a book, listen to music, or spend time with a friend. · If you think you are depressed, talk to your doctor about treatment. · Keep a daily pain diary. Record how your moods, thoughts, sleep patterns, activities, and medicine affect your pain. You may find that your pain is worse during or after certain activities or when you are feeling a certain emotion. Having a record of your pain can help you and your doctor find the best ways to treat your pain. · Take pain medicines exactly as directed. ¨ If the doctor gave you a prescription medicine for pain, take it as prescribed. ¨ If you are not taking a prescription pain medicine, ask your doctor if you can take an over-the-counter medicine.   Reducing constipation caused by pain medicine  · Include fruits, vegetables, beans, and whole grains in your diet each day. These foods are high in fiber. · Drink plenty of fluids, enough so that your urine is light yellow or clear like water. If you have kidney, heart, or liver disease and have to limit fluids, talk with your doctor before you increase the amount of fluids you drink. · If your doctor recommends it, get more exercise. Walking is a good choice. Bit by bit, increase the amount you walk every day. Try for at least 30 minutes on most days of the week. · Schedule time each day for a bowel movement. A daily routine may help. Take your time and do not strain when having a bowel movement. When should you call for help? Call your doctor now or seek immediate medical care if:  · Your pain gets worse or is out of control. · You feel down or blue, or you do not enjoy things like you once did. You may be depressed, which is common in people with chronic pain. Depression can be treated. · You have vomiting or cramps for more than 2 hours. Watch closely for changes in your health, and be sure to contact your doctor if:  · You cannot sleep because of pain. · You are very worried or anxious about your pain. · You have trouble taking your pain medicine. · You have any concerns about your pain medicine. · You have trouble with bowel movements, such as:  ¨ No bowel movement in 3 days. ¨ Blood in the anal area, in your stool, or on the toilet paper. ¨ Diarrhea for more than 24 hours. Where can you learn more? Go to http://fidle-jill.info/. Enter N004 in the search box to learn more about \"Chronic Pain: Care Instructions. \"  Current as of: October 14, 2016  Content Version: 11.3  © 3415-6566 [employer]. Care instructions adapted under license by [employer] (which disclaims liability or warranty for this information).  If you have questions about a medical condition or this instruction, always ask your healthcare professional. [place] [address] any warranty or liability for your use of this information.

## 2017-08-19 NOTE — ED TRIAGE NOTES
Pt states she has hx of pancreatitis and states she has been vomiting with abd pain since Thursday. Pt also states she has been having a vaginal discharge also.

## 2017-08-19 NOTE — ED NOTES
The patient and parent was given their discharge instructions and  was not given prescriptions. The  patient and parent verbalized understanding and had no additional questions. The patient was alert and was discharged via Ambulatory, without additional complaints at time of discharge.   No apparent distress noted

## 2017-08-19 NOTE — ED PROVIDER NOTES
HPI Comments: 39-year-old white female with chronic abdominal pain possibly due to pancreatitis presents with increased pain and vomiting for the past 3 days. She states she cannot keep any of her medications down. No fever. No diarrhea. She states the symptoms began after eating fish. Patient is a 39 y.o. female presenting with abdominal pain. The history is provided by the patient. Abdominal Pain           Past Medical History:   Diagnosis Date    Arthritis     Asthma     uses MDI as needed - rarely-pt can't remember last time needed    Avascular necrosis of femur head     right hip, left hip replacements    Chronic pain     Chronic pancreatitis ([facility] [address] 12/10/2011    Endocrine disease diagnosed 2003    ADDISONS DISEASE    Endometriosis     hysterectomy    GERD (gastroesophageal reflux disease)     Infectious disease 2007    MRSA-2007, community acquired - in hospital ([facility] about 6 months - 3 months of which was in coma;  [facility]-2009     Nausea & vomiting     Other ill-defined conditions     allergies    Pancreatitis chronic     7 episodes    Unspecified adverse effect of anesthesia     pt reports \"difficult to sedate       Past Surgical History:   Procedure Laterality Date    HX APPENDECTOMY      HX CHOLECYSTECTOMY      HX GYN      hysterectomy    HX ORTHOPAEDIC  2010    right EDIN    HX ORTHOPAEDIC  2010    Left  EDIN    HX OTHER SURGICAL  2007    I&D of 9 abcesses -MRSA    HX SHOULDER REPLACEMENT  12/2011    Right    HX SHOULDER REPLACEMENT  2015    left    HX TONSILLECTOMY           Family History:   Problem Relation Age of Onset    Cancer Mother      THROID CANCER    Diabetes Maternal Grandmother     Stroke Maternal Grandfather     Heart Disease Paternal Grandmother        Social History     Social History    Marital status: SINGLE     Spouse name: N/A    Number of children: N/A    Years of education: N/A     Occupational History    Not on file.      Social History Main Topics    Smoking status: Never Smoker    Smokeless tobacco: Never Used    Alcohol use No    Drug use: No    Sexual activity: Not Currently     Other Topics Concern    Not on file     Social History Narrative         ALLERGIES: Latex; Peanut; Shellfish containing products; Beef containing products; Chicken derived; Chlorpromazine; Clindamycin; Dopamine receptor agonist; Gluten; Hydroxyzine pamoate; Ibuprofen; Iodine; Naproxen sodium; Nubain [nalbuphine]; Other medication; Pcn [penicillins]; Povidone-iodine; Prochlorperazine edisylate; Promethazine; Reglan [metoclopramide]; Soy; Sulfa (sulfonamide antibiotics); Toradol [ketorolac tromethamine]; Tramadol; and Zithromax [azithromycin]    Review of Systems   Gastrointestinal: Positive for abdominal pain. Vitals:    08/19/17 0947   BP: (!) 152/94   Pulse: 91   Resp: 16   Temp: 98 °F (36.7 °C)   SpO2: 97%   Weight: 77.1 kg (170 lb)   Height: 5' 4\" (1.626 m)            Physical Exam   Genitourinary: Vagina normal.   Genitourinary Comments: Trace normal-appearing discharge. No purulence. No bleeding. MDM  Number of Diagnoses or Management Options  Chronic abdominal pain:   Diagnosis management comments: Blood work is all unremarkable. No findings to suggest pancreatitis at this time. Dip normal.  Pregnancy negative. Patient has a nonsurgical abdominal exam.  She was treated with IV fluids and Zofran. She had no vomiting while being observed. I did try to treat her pain with both Levsin and Bentyl however she states that both of these have caused dystonia in the past.  I have advised her that opiates are not indicated for chronic abdominal pain and would likely make her pain worse in the long run. Wet prep did show some yeast.  We will treat with lamisil. She has been advised follow-up with her gastroenterologist and primary care this coming week.        Amount and/or Complexity of Data Reviewed  Clinical lab tests: ordered and reviewed  Tests in the medicine section of CPT®: ordered and reviewed    Risk of Complications, Morbidity, and/or Mortality  Presenting problems: moderate  Diagnostic procedures: low  Management options: low      ED Course       Procedures

## 2017-08-22 LAB
C TRACH RRNA SPEC QL NAA+PROBE: NEGATIVE
N GONORRHOEA RRNA SPEC QL NAA+PROBE: NEGATIVE
SPECIMEN SOURCE: NORMAL

## 2018-03-15 ENCOUNTER — HOSPITAL ENCOUNTER (EMERGENCY)
Age: 38
Discharge: HOME | End: 2018-03-15
Attending: EMERGENCY MEDICINE
Payer: COMMERCIAL

## 2018-03-15 ENCOUNTER — APPOINTMENT (OUTPATIENT)
Dept: GENERAL RADIOLOGY | Age: 38
End: 2018-03-15
Attending: EMERGENCY MEDICINE
Payer: COMMERCIAL

## 2018-03-15 VITALS
TEMPERATURE: 98 F | BODY MASS INDEX: 29.53 KG/M2 | SYSTOLIC BLOOD PRESSURE: 125 MMHG | RESPIRATION RATE: 16 BRPM | WEIGHT: 173 LBS | HEIGHT: 64 IN | OXYGEN SATURATION: 98 % | HEART RATE: 85 BPM | DIASTOLIC BLOOD PRESSURE: 80 MMHG
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
## 2018-03-15 DIAGNOSIS — M25.50 ARTHRALGIA, UNSPECIFIED JOINT: ICD-10-CM

## 2018-03-15 PROCEDURE — 73110 X-RAY EXAM OF WRIST: CPT

## 2018-03-15 PROCEDURE — 99283 EMERGENCY DEPT VISIT LOW MDM: CPT | Performed by: PHYSICIAN ASSISTANT

## 2018-03-15 PROCEDURE — 73030 X-RAY EXAM OF SHOULDER: CPT

## 2018-03-15 PROCEDURE — 73502 X-RAY EXAM HIP UNI 2-3 VIEWS: CPT

## 2018-03-15 PROCEDURE — 73630 X-RAY EXAM OF FOOT: CPT

## 2018-03-15 PROCEDURE — 73610 X-RAY EXAM OF ANKLE: CPT

## 2018-03-15 NOTE — ED NOTES
I have reviewed discharge instructions with the patient. The patient verbalized understanding. Patient left ED via Discharge Method: wheelchair to Home with family. Opportunity for questions and clarification provided. Patient given 0 scripts. To continue your aftercare when you leave the hospital, you may receive an automated call from our care team to check in on how you are doing. This is a free service and part of our promise to provide the best care and service to meet your aftercare needs.  If you have questions, or wish to unsubscribe from this service please call [phone]. Thank you for Choosing our [facility] Emergency Department.

## 2018-03-15 NOTE — DISCHARGE INSTRUCTIONS
Joint Pain: Care Instructions  Your Care Instructions    Many people have small aches and pains from overuse or injury to muscles and joints. Joint injuries often happen during sports or recreation, work tasks, or projects around the home. An overuse injury can happen when you put too much stress on a joint or when you do an activity that stresses the joint over and over, such as using the computer or rowing a boat. You can take action at home to help your muscles and joints get better. You should feel better in 1 to 2 weeks, but it can take 3 months or more to heal completely. Follow-up care is a key part of your treatment and safety. Be sure to make and go to all appointments, and call your doctor if you are having problems. It's also a good idea to know your test results and keep a list of the medicines you take. How can you care for yourself at home? · Do not put weight on the injured joint for at least a day or two. · For the first day or two after an injury, do not take hot showers or baths, and do not use hot packs. The heat could make swelling worse. · Put ice or a cold pack on the sore joint for 10 to 20 minutes at a time. Try to do this every 1 to 2 hours for the next 3 days (when you are awake) or until the swelling goes down. Put a thin cloth between the ice and your skin. · Wrap the injury in an elastic bandage. Do not wrap it too tightly because this can cause more swelling. · Prop up the sore joint on a pillow when you ice it or anytime you sit or lie down during the next 3 days. Try to keep it above the level of your heart. This will help reduce swelling. · Take an over-the-counter pain medicine, such as acetaminophen (Tylenol), ibuprofen (Advil, Motrin), or naproxen (Aleve). Read and follow all instructions on the label. · After 1 or 2 days of rest, begin moving the joint gently.  While the joint is still healing, you can begin to exercise using activities that do not strain or hurt the painful joint. When should you call for help? Call your doctor now or seek immediate medical care if:  ? · You have signs of infection, such as:  ¨ Increased pain, swelling, warmth, and redness. ¨ Red streaks leading from the joint. ¨ A fever. ? Watch closely for changes in your health, and be sure to contact your doctor if:  ? · Your movement or symptoms are not getting better after 1 to 2 weeks of home treatment. Where can you learn more? Go to http://fidel-jill.info/. Enter P205 in the search box to learn more about \"Joint Pain: Care Instructions. \"  Current as of: March 21, 2017  Content Version: 11.4  © 7725-2923 [employer]. Care instructions adapted under license by [employer] (which disclaims liability or warranty for this information). If you have questions about a medical condition or this instruction, always ask your healthcare professional. [place] [address] any warranty or liability for your use of this information. Musculoskeletal Pain: Care Instructions  Your Care Instructions  Different problems with the bones, muscles, nerves, ligaments, and tendons in the body can cause pain. One or more areas of your body may ache or burn. Or they may feel tired, stiff, or sore. The medical term for this type of pain is musculoskeletal pain. It can have many different causes. Sometimes the pain is caused by an injury such as a strain or sprain. Or you might have pain from using one part of your body in the same way over and over again. This is called overuse. In some cases, the cause of the pain is another health problem such as arthritis or fibromyalgia. The doctor will examine you and ask you questions about your health to help find the cause of your pain. Blood tests or imaging tests like an X-ray may also be helpful. But sometimes doctors can't find a cause of the pain.   Treatment depends on your symptoms and the cause of the pain, if known. The doctor has checked you carefully, but problems can develop later. If you notice any problems or new symptoms, get medical treatment right away. Follow-up care is a key part of your treatment and safety. Be sure to make and go to all appointments, and call your doctor if you are having problems. It's also a good idea to know your test results and keep a list of the medicines you take. How can you care for yourself at home? · Rest until you feel better. · Do not do anything that makes the pain worse. Return to exercise gradually if you feel better and your doctor says it's okay. · Be safe with medicines. Read and follow all instructions on the label. ¨ If the doctor gave you a prescription medicine for pain, take it as prescribed. ¨ If you are not taking a prescription pain medicine, ask your doctor if you can take an over-the-counter medicine. · Put ice or a cold pack on the area for 10 to 20 minutes at a time to ease pain. Put a thin cloth between the ice and your skin. When should you call for help? Call your doctor now or seek immediate medical care if:  · You have new pain, or your pain gets worse. · You have new symptoms such as a fever, a rash, or chills. Watch closely for changes in your health, and be sure to contact your doctor if:  · You do not get better as expected. Where can you learn more? Go to [employer]  Enter Q624 in the search box to learn more about \"Musculoskeletal Pain: Care Instructions. \"   © 0715-9161 [employer]. Care instructions adapted under license by [facility] (which disclaims liability or warranty for this information). This care instruction is for use with your licensed healthcare professional. If you have questions about a medical condition or this instruction, always ask your healthcare professional. [address] any warranty or liability for your use of this information.   Content Version: 46.9.208519; Current as of: November 20, 2015

## 2018-03-15 NOTE — ED PROVIDER NOTES
HPI Comments: Patient states that she tripped and fell 2 days ago and subsequently falling. It sounds as if her legs did a split and got bent behind her. She states she does have avascular necrosis in her knees and they hurt which is why she thinks she fell. She is in physical therapy to strengthen her knees. She also is in pain management and sees Dr. Sandra Montelongo. She saw him this morning at 10:15 AM and got a refill of her hydromorphone 4 mg tablets 4 times a day and OxyContin 40 mg CR tablets twice daily. She states she is still having pain. She did go to the emergency room in [place] 2 days ago when this happened and did get a shot of narcotics there and asked for more repeatedly however they only gave her one. Her mother is driving her today. Patient has chronic pain from many conditions. She is having pain in many joints today. I was asked to go to x-ray and see the patient there to see what needed to be x-rayed. Patient is a 44 y.o. female presenting with fall. The history is provided by the patient. Fall   The accident occurred 2 days ago. The fall occurred while walking. She fell from a height of ground level. She landed on carpet. There was no blood loss. The pain is severe. She was ambulatory at the scene. There was no entrapment after the fall. There was drug use involved in the accident. There was no alcohol use involved in the accident. Pertinent negatives include no visual change, no fever, no numbness, no abdominal pain, no bowel incontinence, no nausea, no vomiting, no hematuria, no headaches, no extremity weakness, no hearing loss, no loss of consciousness, no tingling and no laceration. The symptoms are aggravated by activity and standing.         Past Medical History:   Diagnosis Date    Arthritis     Asthma     uses MDI as needed - rarely-pt can't remember last time needed    Avascular necrosis of femur head     right hip, left hip replacements    Chronic pain     Chronic pancreatitis ([facility] [address] 12/10/2011    Endocrine disease diagnosed 2003    ADDISONS DISEASE    Endometriosis     hysterectomy    GERD (gastroesophageal reflux disease)     Infectious disease 2007    MRSA-2007, community acquired - in hospital ([facility]) about 6 months - 3 months of which was in coma;  HPV-2009     Nausea & vomiting     Other ill-defined conditions(799.89)     allergies    Pancreatitis chronic     7 episodes    Unspecified adverse effect of anesthesia     pt reports \"difficult to sedate       Past Surgical History:   Procedure Laterality Date    HX APPENDECTOMY      HX CHOLECYSTECTOMY      HX GYN      hysterectomy    HX ORTHOPAEDIC  2010    right EDIN    HX ORTHOPAEDIC  2010    Left  EDIN    HX OTHER SURGICAL  2007    I&D of 9 abcesses -MRSA    HX SHOULDER REPLACEMENT  12/2011    Right    HX SHOULDER REPLACEMENT  2015    left    HX TONSILLECTOMY           Family History:   Problem Relation Age of Onset    Cancer Mother      THROID CANCER    Diabetes Maternal Grandmother     Stroke Maternal Grandfather     Heart Disease Paternal Grandmother        Social History     Social History    Marital status: SINGLE     Spouse name: N/A    Number of children: N/A    Years of education: N/A     Occupational History    Not on file. Social History Main Topics    Smoking status: Never Smoker    Smokeless tobacco: Never Used    Alcohol use No    Drug use: No    Sexual activity: Not Currently     Other Topics Concern    Not on file     Social History Narrative         ALLERGIES: Latex; Peanut; Shellfish containing products; Beef containing products; Chicken derived; Chlorpromazine; Clindamycin; Dopamine receptor agonist; Gluten; Hydroxyzine pamoate; Ibuprofen; Iodine; Naproxen sodium; Nubain [nalbuphine]; Other medication; Pcn [penicillins]; Povidone-iodine; Prochlorperazine edisylate; Promethazine; Reglan [metoclopramide]; Soy; Sulfa (sulfonamide antibiotics);  Toradol [ketorolac tromethamine]; Tramadol; and Zithromax [azithromycin]    Review of Systems   Constitutional: Negative. Negative for fever. HENT: Negative. Eyes: Negative. Respiratory: Negative. Cardiovascular: Negative. Gastrointestinal: Negative. Negative for abdominal pain, bowel incontinence, nausea and vomiting. Genitourinary: Negative. Negative for hematuria. Musculoskeletal: Negative. Negative for extremity weakness. Bilateral foot/ankle pain, wrist pain, shoulder pain, hip pain   Skin: Negative. Neurological: Negative. Negative for tingling, loss of consciousness, numbness and headaches. Psychiatric/Behavioral: Negative. All other systems reviewed and are negative. Vitals:    03/15/18 1203   BP: 126/88   Pulse: 97   Resp: 18   Temp: 98 °F (36.7 °C)   SpO2: 96%   Weight: 78.5 kg (173 lb)   Height: 5' 4\" (1.626 m)            Physical Exam   Constitutional: She is oriented to person, place, and time. She appears well-developed and well-nourished. HENT:   Head: Normocephalic and atraumatic. Right Ear: External ear normal.   Left Ear: External ear normal.   Nose: Nose normal.   Mouth/Throat: Oropharynx is clear and moist.   Eyes: Conjunctivae and EOM are normal. Pupils are equal, round, and reactive to light. Neck: Normal range of motion. Neck supple. Cardiovascular: Normal rate, regular rhythm, normal heart sounds and intact distal pulses. Pulmonary/Chest: Effort normal and breath sounds normal.   Abdominal: Soft. Bowel sounds are normal.   Musculoskeletal: Normal range of motion. Arms:       Legs:  Neurological: She is alert and oriented to person, place, and time. She has normal reflexes. Skin: Skin is warm and dry. No laceration noted. Psychiatric: She has a normal mood and affect. Her behavior is normal. Judgment and thought content normal.   Nursing note and vitals reviewed.        MDM  Number of Diagnoses or Management Options  Arthralgia, unspecified joint: new and requires workup  Fall, initial encounter: new and requires workup     Amount and/or Complexity of Data Reviewed  Tests in the radiology section of CPT®: ordered and reviewed  Discuss the patient with other providers: yes (Dr. Kolton Marquez  )    Risk of Complications, Morbidity, and/or Mortality  Presenting problems: moderate  Diagnostic procedures: moderate  Management options: moderate    Patient Progress  Patient progress: stable        ED Course       Procedures    2:00 PM Spoke with Dr. Kolton Marquez regarding patient. Patient did ask for narcotics while she was here and she just filled her Dilaudid and OxyContin this morning. I have explained to her that all of her x-rays show no broken bones and the x-rays done in [place] on Tuesday did not show broken bones and that she should go home, use warm moist heat to her muscles and ice to her joints, follow-up with her orthopedic surgeon as scheduled, stretch multiple times a day and avoid painful activities. We have discussed the fact she does not need any more narcotics here in the emergency room today with the current medications she takes and I have offered her a muscle relaxer which she declined. Mom is here with her and driving her home. Patient is stable for discharge. The patient was observed in the ED. Results Reviewed:  XR FOOT LT MIN 3 V   Final Result   IMPRESSION: NEGATIVE FOOT. Note: If a subtle fracture or ligamentous injury is suspected, then an MRI scan   would be most definitive. XR FOOT RT MIN 3 V   Final Result   IMPRESSION: Remote fractures of the third and fourth metatarsals, no acute   fracture or dislocation.       XR SHOULDER RT AP/LAT MIN 2 V   Final Result   IMPRESSION: Right shoulder arthroplasty      XR SHOULDER LT AP/LAT MIN 2 V   Final Result   IMPRESSION: Left shoulder arthroplasty, no fracture      XR HIP LT W OR WO PELV 2-3 VWS   Final Result   Impression: Negative for fracture or dislocation, left hip arthroplasty Note: If a subtle fracture is suspected, then  MRI would be most definitive. XR HIP RT W OR WO PELV 2-3 VWS   Final Result   Impression: Negative for fracture or dislocation, right hip arthroplasty   Note: If a subtle fracture is suspected, then  MRI would be most definitive. XR WRIST RT AP/LAT/OBL MIN 3V   Final Result   IMPRESSION: radiopaque foreign body as above      XR WRIST LT AP/LAT/OBL MIN 3V   Final Result   IMPRESSION: Negative      XR ANKLE LT MIN 3 V   Final Result   IMPRESSION: Negative          I discussed the results of all labs, procedures, radiographs, and treatments with the patient and available family. Treatment plan is agreed upon and the patient is ready for discharge. All voiced understanding of the discharge plan and medication instructions or changes as appropriate. Questions about treatment in the ED were answered. All were encouraged to return should symptoms worsen or new problems develop.

## 2018-03-15 NOTE — ED TRIAGE NOTES
Reports a fall 2 days ago with swelling to the left ankle; also reports pain after fall to both ankles, knees, shoulders.  Had Xrays of the left ankle done on Tuesday but has been unable to connect with her Orthopedist.

## 2020-05-21 ENCOUNTER — APPOINTMENT (OUTPATIENT)
Dept: GENERAL RADIOLOGY | Age: 40
End: 2020-05-21
Attending: EMERGENCY MEDICINE
Payer: COMMERCIAL

## 2020-05-21 ENCOUNTER — HOSPITAL ENCOUNTER (EMERGENCY)
Age: 40
Discharge: HOME | End: 2020-05-22
Attending: EMERGENCY MEDICINE
Payer: COMMERCIAL

## 2020-05-21 DIAGNOSIS — Z87.19 HISTORY OF CHRONIC PANCREATITIS: ICD-10-CM

## 2020-05-21 DIAGNOSIS — R11.2 INTRACTABLE VOMITING WITH NAUSEA, UNSPECIFIED VOMITING TYPE: ICD-10-CM

## 2020-05-21 DIAGNOSIS — R10.84 ABDOMINAL PAIN, GENERALIZED: Primary | ICD-10-CM

## 2020-05-21 LAB
ALBUMIN SERPL-MCNC: 3.3 G/DL (ref 3.5–5)
ALBUMIN/GLOB SERPL: 0.8 {RATIO} (ref 1.2–3.5)
ALP SERPL-CCNC: 52 U/L (ref 50–130)
ALT SERPL-CCNC: 24 U/L (ref 12–65)
ANION GAP SERPL CALC-SCNC: 9 MMOL/L (ref 7–16)
AST SERPL-CCNC: 16 U/L (ref 15–37)
BASOPHILS # BLD: 0 K/UL (ref 0–0.2)
BASOPHILS NFR BLD: 0 % (ref 0–2)
BILIRUB SERPL-MCNC: 0.4 MG/DL (ref 0.2–1.1)
BUN SERPL-MCNC: 9 MG/DL (ref 6–23)
CALCIUM SERPL-MCNC: 9 MG/DL (ref 8.3–10.4)
CHLORIDE SERPL-SCNC: 106 MMOL/L (ref 98–107)
CO2 SERPL-SCNC: 25 MMOL/L (ref 21–32)
CREAT SERPL-MCNC: 1.04 MG/DL (ref 0.6–1)
DIFFERENTIAL METHOD BLD: ABNORMAL
EOSINOPHIL # BLD: 0 K/UL (ref 0–0.8)
EOSINOPHIL NFR BLD: 0 % (ref 0.5–7.8)
ERYTHROCYTE [DISTWIDTH] IN BLOOD BY AUTOMATED COUNT: 13.5 % (ref 11.9–14.6)
GLOBULIN SER CALC-MCNC: 3.9 G/DL (ref 2.3–3.5)
GLUCOSE SERPL-MCNC: 200 MG/DL (ref 65–100)
HCT VFR BLD AUTO: 42.6 % (ref 35.8–46.3)
HGB BLD-MCNC: 13.4 G/DL (ref 11.7–15.4)
IMM GRANULOCYTES # BLD AUTO: 0.1 K/UL (ref 0–0.5)
IMM GRANULOCYTES NFR BLD AUTO: 1 % (ref 0–5)
LIPASE SERPL-CCNC: 87 U/L (ref 73–393)
LYMPHOCYTES # BLD: 0.6 K/UL (ref 0.5–4.6)
LYMPHOCYTES NFR BLD: 5 % (ref 13–44)
MAGNESIUM SERPL-MCNC: 1.8 MG/DL (ref 1.8–2.4)
MCH RBC QN AUTO: 29.6 PG (ref 26.1–32.9)
MCHC RBC AUTO-ENTMCNC: 31.5 G/DL (ref 31.4–35)
MCV RBC AUTO: 94 FL (ref 79.6–97.8)
MONOCYTES # BLD: 0.1 K/UL (ref 0.1–1.3)
MONOCYTES NFR BLD: 0 % (ref 4–12)
NEUTS SEG # BLD: 11.4 K/UL (ref 1.7–8.2)
NEUTS SEG NFR BLD: 94 % (ref 43–78)
NRBC # BLD: 0 K/UL (ref 0–0.2)
PLATELET # BLD AUTO: 287 K/UL (ref 150–450)
PMV BLD AUTO: 8.9 FL (ref 9.4–12.3)
POTASSIUM SERPL-SCNC: 3.8 MMOL/L (ref 3.5–5.1)
PROT SERPL-MCNC: 7.2 G/DL (ref 6.3–8.2)
RBC # BLD AUTO: 4.53 M/UL (ref 4.05–5.2)
SODIUM SERPL-SCNC: 140 MMOL/L (ref 136–145)
WBC # BLD AUTO: 12.1 K/UL (ref 4.3–11.1)

## 2020-05-21 PROCEDURE — 83735 ASSAY OF MAGNESIUM: CPT

## 2020-05-21 PROCEDURE — 80053 COMPREHEN METABOLIC PANEL: CPT

## 2020-05-21 PROCEDURE — 81003 URINALYSIS AUTO W/O SCOPE: CPT

## 2020-05-21 PROCEDURE — 96374 THER/PROPH/DIAG INJ IV PUSH: CPT

## 2020-05-21 PROCEDURE — 83690 ASSAY OF LIPASE: CPT

## 2020-05-21 PROCEDURE — 85025 COMPLETE CBC W/AUTO DIFF WBC: CPT

## 2020-05-21 PROCEDURE — 74011636320 HC RX REV CODE- 636/320: Performed by: EMERGENCY MEDICINE

## 2020-05-21 PROCEDURE — 96361 HYDRATE IV INFUSION ADD-ON: CPT

## 2020-05-21 PROCEDURE — 96376 TX/PRO/DX INJ SAME DRUG ADON: CPT

## 2020-05-21 PROCEDURE — 74011250636 HC RX REV CODE- 250/636: Performed by: EMERGENCY MEDICINE

## 2020-05-21 PROCEDURE — 96375 TX/PRO/DX INJ NEW DRUG ADDON: CPT

## 2020-05-21 PROCEDURE — 74019 RADEX ABDOMEN 2 VIEWS: CPT

## 2020-05-21 PROCEDURE — 99284 EMERGENCY DEPT VISIT MOD MDM: CPT

## 2020-05-21 RX ORDER — HYDROMORPHONE HYDROCHLORIDE 1 MG/ML
2 INJECTION, SOLUTION INTRAMUSCULAR; INTRAVENOUS; SUBCUTANEOUS ONCE
Status: COMPLETED | OUTPATIENT
Start: 2020-05-21 | End: 2020-05-21

## 2020-05-21 RX ORDER — ONDANSETRON 2 MG/ML
4 INJECTION INTRAMUSCULAR; INTRAVENOUS
Status: COMPLETED | OUTPATIENT
Start: 2020-05-21 | End: 2020-05-21

## 2020-05-21 RX ADMIN — SODIUM CHLORIDE, SODIUM LACTATE, POTASSIUM CHLORIDE, AND CALCIUM CHLORIDE 1000 ML: 600; 310; 30; 20 INJECTION, SOLUTION INTRAVENOUS at 20:48

## 2020-05-21 RX ADMIN — ONDANSETRON 4 MG: 2 INJECTION INTRAMUSCULAR; INTRAVENOUS at 23:55

## 2020-05-21 RX ADMIN — DIATRIZOATE MEGLUMINE AND DIATRIZOATE SODIUM 15 ML: 660; 100 LIQUID ORAL; RECTAL at 23:41

## 2020-05-21 RX ADMIN — HYDROMORPHONE HYDROCHLORIDE 2 MG: 1 INJECTION, SOLUTION INTRAMUSCULAR; INTRAVENOUS; SUBCUTANEOUS at 20:34

## 2020-05-21 RX ADMIN — ONDANSETRON 4 MG: 2 INJECTION INTRAMUSCULAR; INTRAVENOUS at 20:34

## 2020-05-21 RX ADMIN — HYDROMORPHONE HYDROCHLORIDE 2 MG: 1 INJECTION, SOLUTION INTRAMUSCULAR; INTRAVENOUS; SUBCUTANEOUS at 21:24

## 2020-05-21 RX ADMIN — HYDROMORPHONE HYDROCHLORIDE 2 MG: 1 INJECTION, SOLUTION INTRAMUSCULAR; INTRAVENOUS; SUBCUTANEOUS at 23:55

## 2020-05-21 NOTE — LETTER
NOTIFICATION RETURN TO WORK / SCHOOL 
 
5/21/2020 7:41 PM 
 
Ms. Stephanie Dawson [address] [facility] [address] To Whom It May Concern: 
 
Stephanie Dawson is currently under the care of [facility] EMERGENCY DEPT. She will return to work/school on: 5/23/20 If there are questions or concerns please have the patient contact our office. Sincerely, Antonio Ramirez MD

## 2020-05-21 NOTE — ED PROVIDER NOTES
Patient presents the ER complaining of abdominal pain. States she has a longstanding history of chronic pancreatitis. She had an EGD performed at outpatient facility earlier today. States that the procedure she started having worsening pain and vomiting. States this normally happens after EGD. Did speak to her gastroenterologist about possibly need to be admitted. States that he did not want admit her. She subsequently came our facility. Denies any fevers or hematemesis. Reports the pain in her epigastric and right upper quadrant. The history is provided by the patient. Abdominal Pain    This is a recurrent problem. The current episode started more than 2 days ago. The problem has not changed since onset. The pain is located in the epigastric region and RUQ. The quality of the pain is aching and cramping. The pain is at a severity of 5/10. The pain is moderate. Associated symptoms include nausea and vomiting. Pertinent negatives include no fever and no back pain. Her past medical history is significant for pancreatitis. Her past medical history does not include PUD or ulcerative colitis.         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     Nausea & vomiting     Other ill-defined conditions(799.89)     allergies    Pancreatitis chronic     7 episodes    Unspecified adverse effect of anesthesia     pt reports \"difficult to sedate       Past Surgical History:   Procedure Laterality Date    HX APPENDECTOMY      HX CHOLECYSTECTOMY      HX GYN      hysterectomy    HX ORTHOPAEDIC  2010    right EDIN    HX ORTHOPAEDIC  2010    Left  EDIN    HX OTHER SURGICAL  2007    I&D of 9 abcesses -MRSA    HX SHOULDER REPLACEMENT  12/2011    Right    HX SHOULDER REPLACEMENT  2015    left    HX TONSILLECTOMY           Family History:   Problem Relation Age of Onset    Cancer Mother         THROID CANCER    Diabetes Maternal Grandmother     Stroke Maternal Grandfather     Heart Disease Paternal Grandmother        Social History     Socioeconomic History    Marital status: SINGLE     Spouse name: Not on file    Number of children: Not on file    Years of education: Not on file    Highest education level: Not on file   Occupational History    Not on file   Social Needs    Financial resource strain: Not on file    Food insecurity     Worry: Not on file     Inability: Not on file    Transportation needs     Medical: Not on file     Non-medical: Not on file   Tobacco Use    Smoking status: Never Smoker    Smokeless tobacco: Never Used   Substance and Sexual Activity    Alcohol use: No    Drug use: No    Sexual activity: Not Currently   Lifestyle    Physical activity     Days per week: Not on file     Minutes per session: Not on file    Stress: Not on file   Relationships    Social connections     Talks on phone: Not on file     Gets together: Not on file     Attends Synagogue service: Not on file     Active member of club or organization: Not on file     Attends meetings of clubs or organizations: Not on file     Relationship status: Not on file    Intimate partner violence     Fear of current or ex partner: Not on file     Emotionally abused: Not on file     Physically abused: Not on file     Forced sexual activity: Not on file   Other Topics Concern    Not on file   Social History Narrative    Not on file         ALLERGIES: Latex; Peanut; Shellfish containing products;  Beef containing products; Chicken derived; Chlorpromazine; Clindamycin; Dopamine receptor agonist; Gluten; Hydroxyzine pamoate; Ibuprofen; Iodine; Naproxen sodium; Nubain [nalbuphine]; Other medication; Pcn [penicillins]; Povidone-iodine; Prochlorperazine edisylate; Promethazine; Reglan [metoclopramide]; Soy; Sulfa (sulfonamide antibiotics); Toradol [ketorolac tromethamine]; Tramadol; and Zithromax [azithromycin]    Review of Systems   Constitutional: Negative for fatigue and fever. HENT: Negative for congestion and dental problem. Eyes: Negative for photophobia and redness. Respiratory: Negative for chest tightness and shortness of breath. Cardiovascular: Negative for palpitations and leg swelling. Gastrointestinal: Positive for abdominal pain, nausea and vomiting. Genitourinary: Negative for urgency. Musculoskeletal: Negative for back pain. Neurological: Negative for speech difficulty. Hematological: Negative for adenopathy. Psychiatric/Behavioral: Negative for behavioral problems and confusion. All other systems reviewed and are negative. Vitals:    05/21/20 1924   BP: (!) 169/106   Pulse: (!) 59   Resp: 16   Temp: 98.9 °F (37.2 °C)   SpO2: 94%   Weight: 74.8 kg (165 lb)   Height: 5' 4\" (1.626 m)            Physical Exam  Vitals signs and nursing note reviewed. Constitutional:       Appearance: Normal appearance. HENT:      Head: Atraumatic. Nose: Nose normal. No congestion or rhinorrhea. Neck:      Musculoskeletal: Normal range of motion and neck supple. Cardiovascular:      Rate and Rhythm: Normal rate and regular rhythm. Pulses: Normal pulses. Heart sounds: Normal heart sounds. Pulmonary:      Effort: Pulmonary effort is normal. No respiratory distress. Breath sounds: Normal breath sounds. No stridor. Abdominal:      General: Abdomen is flat. Bowel sounds are normal.      Tenderness: There is abdominal tenderness. Musculoskeletal: Normal range of motion.          General: No swelling. Skin:     General: Skin is warm and dry. Capillary Refill: Capillary refill takes less than 2 seconds. Coloration: Skin is not jaundiced. Neurological:      General: No focal deficit present. Mental Status: She is alert. Mental status is at baseline. MDM  Number of Diagnoses or Management Options  Abdominal pain, generalized:   History of chronic pancreatitis:   Intractable vomiting with nausea, unspecified vomiting type:   Diagnosis management comments: We will try to review outside records, labs, urinalysis as well as lipase. 8:04 PM  Reviewed patient's prescription history, high-dose narcotics she takes daily    11:00 PM  Labs show white count of 12.1, 94% granulocytes. Chemistry panel stable. Still voices significant pain and vomiting, Will discuss case with hospitalist for admission for abdominal pain/ Nausea and vomiting    11:37 PM  Patient seen by hospitalist.  At this time has requested a CT scan of the abdomen and pelvis be performed. If negative patient may not warrant hospitalization.        Amount and/or Complexity of Data Reviewed  Clinical lab tests: ordered and reviewed    Risk of Complications, Morbidity, and/or Mortality  Presenting problems: moderate  Diagnostic procedures: moderate  Management options: moderate    Patient Progress  Patient progress: stable         Procedures      Results Include:    Recent Results (from the past 24 hour(s))   CBC WITH AUTOMATED DIFF    Collection Time: 05/21/20  8:23 PM   Result Value Ref Range    WBC 12.1 (H) 4.3 - 11.1 K/uL    RBC 4.53 4.05 - 5.2 M/uL    HGB 13.4 11.7 - 15.4 g/dL    HCT 42.6 35.8 - 46.3 %    MCV 94.0 79.6 - 97.8 FL    MCH 29.6 26.1 - 32.9 PG    MCHC 31.5 31.4 - 35.0 g/dL    RDW 13.5 11.9 - 14.6 %    PLATELET 770 039 - 177 K/uL    MPV 8.9 (L) 9.4 - 12.3 FL    ABSOLUTE NRBC 0.00 0.0 - 0.2 K/uL    DF AUTOMATED      NEUTROPHILS 94 (H) 43 - 78 %    LYMPHOCYTES 5 (L) 13 - 44 %    MONOCYTES 0 (L) 4.0 - 12.0 %    EOSINOPHILS 0 (L) 0.5 - 7.8 %    BASOPHILS 0 0.0 - 2.0 %    IMMATURE GRANULOCYTES 1 0.0 - 5.0 %    ABS. NEUTROPHILS 11.4 (H) 1.7 - 8.2 K/UL    ABS. LYMPHOCYTES 0.6 0.5 - 4.6 K/UL    ABS. MONOCYTES 0.1 0.1 - 1.3 K/UL    ABS. EOSINOPHILS 0.0 0.0 - 0.8 K/UL    ABS. BASOPHILS 0.0 0.0 - 0.2 K/UL    ABS. IMM. GRANS. 0.1 0.0 - 0.5 K/UL   METABOLIC PANEL, COMPREHENSIVE    Collection Time: 05/21/20  8:23 PM   Result Value Ref Range    Sodium 140 136 - 145 mmol/L    Potassium 3.8 3.5 - 5.1 mmol/L    Chloride 106 98 - 107 mmol/L    CO2 25 21 - 32 mmol/L    Anion gap 9 7 - 16 mmol/L    Glucose 200 (H) 65 - 100 mg/dL    BUN 9 6 - 23 MG/DL    Creatinine 1.04 (H) 0.6 - 1.0 MG/DL    GFR est AA >60 >60 ml/min/1.73m2    GFR est non-AA >60 >60 ml/min/1.73m2    Calcium 9.0 8.3 - 10.4 MG/DL    Bilirubin, total 0.4 0.2 - 1.1 MG/DL    ALT (SGPT) 24 12 - 65 U/L    AST (SGOT) 16 15 - 37 U/L    Alk. phosphatase 52 50 - 130 U/L    Protein, total 7.2 6.3 - 8.2 g/dL    Albumin 3.3 (L) 3.5 - 5.0 g/dL    Globulin 3.9 (H) 2.3 - 3.5 g/dL    A-G Ratio 0.8 (L) 1.2 - 3.5     LIPASE    Collection Time: 05/21/20  8:23 PM   Result Value Ref Range    Lipase 87 73 - 393 U/L   MAGNESIUM    Collection Time: 05/21/20  8:23 PM   Result Value Ref Range    Magnesium 1.8 1.8 - 2.4 mg/dL     Voice dictation software was used during the making of this note. This software is not perfect and grammatical and other typographical errors may be present. This note has been proofread, but may still contain errors.   Danielle Ornelas MD; 5/21/2020 @11:38 PM   ===================================================================

## 2020-05-22 ENCOUNTER — APPOINTMENT (OUTPATIENT)
Dept: CT IMAGING | Age: 40
End: 2020-05-22
Attending: EMERGENCY MEDICINE
Payer: COMMERCIAL

## 2020-05-22 ENCOUNTER — PATIENT OUTREACH (OUTPATIENT)
Dept: CASE MANAGEMENT | Age: 40
End: 2020-05-22

## 2020-05-22 VITALS
HEART RATE: 76 BPM | RESPIRATION RATE: 16 BRPM | TEMPERATURE: 98.8 F | OXYGEN SATURATION: 96 % | WEIGHT: 165 LBS | DIASTOLIC BLOOD PRESSURE: 92 MMHG | HEIGHT: 64 IN | SYSTOLIC BLOOD PRESSURE: 151 MMHG | BODY MASS INDEX: 28.17 KG/M2

## 2020-05-22 PROBLEM — R10.9 CHRONIC ABDOMINAL PAIN: Status: ACTIVE | Noted: 2020-05-22

## 2020-05-22 PROBLEM — G89.29 CHRONIC ABDOMINAL PAIN: Status: ACTIVE | Noted: 2020-05-22

## 2020-05-22 PROBLEM — R10.9 CHRONIC ABDOMINAL PAIN: Chronic | Status: ACTIVE | Noted: 2020-05-22

## 2020-05-22 PROBLEM — G89.29 CHRONIC ABDOMINAL PAIN: Chronic | Status: ACTIVE | Noted: 2020-05-22

## 2020-05-22 PROCEDURE — 74176 CT ABD & PELVIS W/O CONTRAST: CPT

## 2020-05-22 NOTE — H&P
Hospitalist Note     Admit Date:  2020  7:37 PM   Name:  Sophie Bradshaw   Age:  35 y.o.  :  1980   MRN:  [id number]   PCP:  Dawson York MD  Treatment Team: Primary Nurse: Claudio Sweeney    HPI/Subjective:   Consulted for possible admission. Seen/examined. Pt's subjective history lines up with story she gave Dr Lucia Quezada. She is a 34 y/o F with hx of chronic abd pain with extensive workup at multiple institutions over several years, who presented with epigastric pain following EGD earlier today in [place]. Says she \"always gets pain after EGDs\"  Cramping, dull, moderate. One episode of vomiting in ER reportedly, NBNB. Says nothing helps except IV narcotics. Palpation makes it worse. No overt distress currently. No fevers, chills, CP, SOB. Workup unremarkable in ER. Her GI doc reportedly did not want to admit her    Reviewed prior notes. Has seen multiple providers for same complaint. Extensive workup including referral to [address]. Reviewed note from [address], excerpt copied here:  \"Abdominal Pain: patient reports history of chronic pancreatitis, however on EUS  there is no evidence of chronic pancreatitis. Patient has a Gastroenterologist in [facility] who believes she more likely has IBS with some possible gastroparesis. She has tried multiple medications per chart review including Elavil, Neurotin, Lyrica. She is on chronic opioids and per her outside GI physician PO Erythromycin helps. Per Dr. 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 Continued home PO opioids: Oxycontin 40mg Q12H and PO Dilaudid 4mg Q4H PRN. She received several doses of IV Fentanyl for pain post-procedure. Continued PO Erythromycin Ethylsuccinate 200mg, and Zenpep with meals, BID PPI and PRN Zofran. Patient complained of feeling \"like I'm withdrawing\", however abdominal exam was benign and she did not appear to be in much pain.  She requested serum Lipase which was normal, and then requested Amylase and after being told that there was no indication, she became agitated and verbally abusive. Angeline Yuliet Angeline Horvath Patient d/c'd home after much discussion and acting out from patient. \"    10 systems reviewed and negative except as noted in HPI.   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     Nausea & vomiting     Other ill-defined conditions(799.89)     allergies    Pancreatitis chronic     7 episodes    Unspecified adverse effect of anesthesia     pt reports \"difficult to sedate      Past Surgical History:   Procedure Laterality Date    HX APPENDECTOMY      HX CHOLECYSTECTOMY      HX GYN      hysterectomy    HX ORTHOPAEDIC  2010    right EDIN    HX ORTHOPAEDIC  2010    Left  EDIN    HX OTHER SURGICAL  2007    I&D of 9 abcesses -MRSA    HX SHOULDER REPLACEMENT  12/2011    Right    HX SHOULDER REPLACEMENT  2015    left    HX TONSILLECTOMY        Allergies   Allergen Reactions    Latex Anaphylaxis    Peanut Anaphylaxis    Shellfish Containing Products Anaphylaxis    Beef Containing Products Other (comments)     Per allergy testing    Chicken Derived Other (comments)     Per allergy testing    Chlorpromazine Other (comments)     Dystonic      Clindamycin Rash    Dopamine Receptor Agonist Other (comments)     Dystonic      Gluten Other (comments)     Per allergy testing    Hydroxyzine Pamoate Other (comments)     DYSTONIC      Ibuprofen Rash and Photosensitivity    Iodine Rash    Naproxen Sodium Rash  Nubain [Nalbuphine] Rash    Other Medication Other (comments)     Bananas, avacodo, strawberry, potatoes  --- ALL PER ALLERGY TESTING    Pcn [Penicillins] Rash    Povidone-Iodine Rash    Prochlorperazine Edisylate Other (comments)     Dystonic      Promethazine Other (comments)     Dystonic      Reglan [Metoclopramide] Anaphylaxis     dystonia    Soy Other (comments)     Per allergy testing    Sulfa (Sulfonamide Antibiotics) Rash    Toradol [Ketorolac Tromethamine] Rash    Tramadol Rash    Zithromax [Azithromycin] Rash      Social History     Tobacco Use    Smoking status: Never Smoker    Smokeless tobacco: Never Used   Substance Use Topics    Alcohol use: No      Family History   Problem Relation Age of Onset    Cancer Mother         THROID CANCER    Diabetes Maternal Grandmother     Stroke Maternal Grandfather     Heart Disease Paternal Grandmother       Family history reviewed and noncontributory. Immunization History   Administered Date(s) Administered    TD Vaccine 05/07/2009     PTA Medications:  Prior to Admission Medications   Prescriptions Last Dose Informant Patient Reported? Taking? B.infantis-B.ani-B.long-B.bifi (PROBIOTIC 4X) 10-15 mg TbEC   Yes No   Sig: Take 1 Tab by mouth every morning. ESTRADIOL PO   Yes No   Sig: Take  by mouth daily. Epinephrine 0.15 mg/0.15 mL PnIj   Yes No   Sig: by IntraMUSCular route as needed. Epi-pen as needed for allergic reaction   HYDROmorphone (DILAUDID) 4 mg tablet   Yes No   Sig: Take  by mouth every three (3) hours as needed for Pain. Potassium Citrate 15 mEq TbER   Yes No   Sig: Take 15 mEq by mouth two (2) times a day. ZOFRAN 8 mg Tab  Self Yes No   Sig: Take 8 mg by mouth as needed. albuterol (PROVENTIL, VENTOLIN) 90 mcg/Actuation inhaler   Yes No   Sig: Take 2 Puffs by inhalation as needed. calcium citrate 200 mg (950 mg) tablet   Yes No   Sig: Take 200 mg by mouth three (3) times daily.    furosemide (LASIX) 40 mg tablet   Yes No   Sig: Take 40 mg by mouth daily. hydrocortisone (CORTEF) 10 mg tablet   Yes No   Sig: Take 10 mg by mouth every morning. hydrocortisone (CORTEF) 5 mg tablet   Yes No   Sig: Take 5 mg by mouth nightly. lipase-protease-amylase (ZENPEP) capsule   Yes No   Sig: Take 1 Cap by mouth Before breakfast, lunch, dinner and at bedtime. montelukast (SINGULAIR) 10 mg tablet   Yes No   Sig: Take 10 mg by mouth every morning. omeprazole (PRILOSEC) 20 mg capsule   Yes No   Sig: Take 20 mg by mouth two (2) times a day. oxyCODONE CR (OXYCONTIN) 40 mg CR tablet   Yes No   Sig: Take 40 mg by mouth every twelve (12) hours. Indications: CHRONIC PAIN      Facility-Administered Medications: None       Objective:     Patient Vitals for the past 24 hrs:   Temp Pulse Resp BP SpO2   05/22/20 0302 98.8 °F (37.1 °C) 76 16 (!) 151/92 96 %   05/21/20 2321    (!) 154/91 97 %   05/21/20 2241    137/89 97 %   05/21/20 2221    (!) 160/96 96 %   05/21/20 2201    162/89 96 %   05/21/20 2105    (!) 183/99 97 %   05/21/20 2101    (!) 175/95 96 %   05/21/20 2041    (!) 160/98 97 %   05/21/20 2040    175/88 97 %   05/21/20 1924 98.9 °F (37.2 °C) (!) 59 16 (!) 169/106 94 %     Oxygen Therapy  O2 Sat (%): 96 % (05/22/20 0302)  Pulse via Oximetry: 82 beats per minute (05/21/20 2321)  O2 Device: Room air (05/21/20 2058)    No intake or output data in the 24 hours ending 05/22/20 0050    *Note that automatically entered I/Os may not be accurate; dependent on patient compliance with collection and accurate  by assistants. Physical Exam:  General:    Well nourished. Alert. Eyes:   Normal sclerae. Extraocular movements intact. ENT:  Normocephalic, atraumatic. Moist mucous membranes  CV:   RRR. No murmur, rub, or gallop. Lungs:  CTAB. No wheezing, rhonchi, or rales. Abdomen: Soft, nontender, nondistended. Bowel sounds normal.   Extremities: Warm and dry. No cyanosis or edema.   Neurologic: CN II-XII grossly intact. Sensation intact. Skin:     No rashes or jaundice. Psych:  Normal mood and affect. I reviewed the labs, imaging, EKGs, telemetry, and other studies done this admission. Data Review:   Recent Results (from the past 24 hour(s))   CBC WITH AUTOMATED DIFF    Collection Time: 05/21/20  8:23 PM   Result Value Ref Range    WBC 12.1 (H) 4.3 - 11.1 K/uL    RBC 4.53 4.05 - 5.2 M/uL    HGB 13.4 11.7 - 15.4 g/dL    HCT 42.6 35.8 - 46.3 %    MCV 94.0 79.6 - 97.8 FL    MCH 29.6 26.1 - 32.9 PG    MCHC 31.5 31.4 - 35.0 g/dL    RDW 13.5 11.9 - 14.6 %    PLATELET 869 661 - 352 K/uL    MPV 8.9 (L) 9.4 - 12.3 FL    ABSOLUTE NRBC 0.00 0.0 - 0.2 K/uL    DF AUTOMATED      NEUTROPHILS 94 (H) 43 - 78 %    LYMPHOCYTES 5 (L) 13 - 44 %    MONOCYTES 0 (L) 4.0 - 12.0 %    EOSINOPHILS 0 (L) 0.5 - 7.8 %    BASOPHILS 0 0.0 - 2.0 %    IMMATURE GRANULOCYTES 1 0.0 - 5.0 %    ABS. NEUTROPHILS 11.4 (H) 1.7 - 8.2 K/UL    ABS. LYMPHOCYTES 0.6 0.5 - 4.6 K/UL    ABS. MONOCYTES 0.1 0.1 - 1.3 K/UL    ABS. EOSINOPHILS 0.0 0.0 - 0.8 K/UL    ABS. BASOPHILS 0.0 0.0 - 0.2 K/UL    ABS. IMM. GRANS. 0.1 0.0 - 0.5 K/UL   METABOLIC PANEL, COMPREHENSIVE    Collection Time: 05/21/20  8:23 PM   Result Value Ref Range    Sodium 140 136 - 145 mmol/L    Potassium 3.8 3.5 - 5.1 mmol/L    Chloride 106 98 - 107 mmol/L    CO2 25 21 - 32 mmol/L    Anion gap 9 7 - 16 mmol/L    Glucose 200 (H) 65 - 100 mg/dL    BUN 9 6 - 23 MG/DL    Creatinine 1.04 (H) 0.6 - 1.0 MG/DL    GFR est AA >60 >60 ml/min/1.73m2    GFR est non-AA >60 >60 ml/min/1.73m2    Calcium 9.0 8.3 - 10.4 MG/DL    Bilirubin, total 0.4 0.2 - 1.1 MG/DL    ALT (SGPT) 24 12 - 65 U/L    AST (SGOT) 16 15 - 37 U/L    Alk.  phosphatase 52 50 - 130 U/L    Protein, total 7.2 6.3 - 8.2 g/dL    Albumin 3.3 (L) 3.5 - 5.0 g/dL    Globulin 3.9 (H) 2.3 - 3.5 g/dL    A-G Ratio 0.8 (L) 1.2 - 3.5     LIPASE    Collection Time: 05/21/20  8:23 PM   Result Value Ref Range    Lipase 87 73 - 393 U/L   MAGNESIUM Collection Time: 05/21/20  8:23 PM   Result Value Ref Range    Magnesium 1.8 1.8 - 2.4 mg/dL       All Micro Results     None          No current facility-administered medications for this encounter. Current Outpatient Medications   Medication Sig    hydrocortisone (CORTEF) 10 mg tablet Take 10 mg by mouth every morning.  hydrocortisone (CORTEF) 5 mg tablet Take 5 mg by mouth nightly.  ESTRADIOL PO Take  by mouth daily.  B.infantis-B.ani-B.long-B.bifi (PROBIOTIC 4X) 10-15 mg TbEC Take 1 Tab by mouth every morning.  furosemide (LASIX) 40 mg tablet Take 40 mg by mouth daily.  Potassium Citrate 15 mEq TbER Take 15 mEq by mouth two (2) times a day.  lipase-protease-amylase (ZENPEP) capsule Take 1 Cap by mouth Before breakfast, lunch, dinner and at bedtime.  calcium citrate 200 mg (950 mg) tablet Take 200 mg by mouth three (3) times daily.  oxyCODONE CR (OXYCONTIN) 40 mg CR tablet Take 40 mg by mouth every twelve (12) hours. Indications: CHRONIC PAIN    omeprazole (PRILOSEC) 20 mg capsule Take 20 mg by mouth two (2) times a day.  HYDROmorphone (DILAUDID) 4 mg tablet Take  by mouth every three (3) hours as needed for Pain.  montelukast (SINGULAIR) 10 mg tablet Take 10 mg by mouth every morning.  albuterol (PROVENTIL, VENTOLIN) 90 mcg/Actuation inhaler Take 2 Puffs by inhalation as needed.  Epinephrine 0.15 mg/0.15 mL PnIj by IntraMUSCular route as needed. Epi-pen as needed for allergic reaction    ZOFRAN 8 mg Tab Take 8 mg by mouth as needed. Other Studies:  Xr Abd (ap And Erect Or Decub)    Result Date: 5/21/2020  Two view abdomen INDICATION:  Abdominal pain Flat and upright views the abdomen were obtained. FINDINGS: There is moderate stool volume in the colon. Bowel gas pattern is nonobstructive. No evidence of free air. Cholecystectomy clips are seen. Visualized lung bases are unremarkable. Bilateral total hip arthroplasties are partially seen. IMPRESSION: 1. Moderate stool volume in the colon. Normal bowel gas pattern. Ct Abd Pelv Wo Cont    Result Date: 5/22/2020  CT abdomen and pelvis without contrast COMPARISON: 2009. INDICATION: Abdominal pain. TECHNIQUE: CT imaging of the abdomen and pelvis was performed without intravenous contrast. Radiation dose reduction techniques were used for this study:  Our CT scanners use one or all of the following: Automated exposure control, adjustment of the mA and/or kVp according to patient's size, iterative reconstruction. FINDINGS: Visualized lung bases are unremarkable. Abdomen findings: Absence of IV contrast limits sensitivity. Gallbladder is surgically absent. The liver and the spleen are normal in contour. There is no peripancreatic fluid collection or inflammatory stranding. Unenhanced adrenal glands and abdominal aorta are unremarkable. Few tiny nonobstructing right renal calculi. No left renal calculus. No hydronephrosis. Stomach is normal in contour. Small bowel loops are normal in caliber. There is no small bowel obstruction. No evidence of lymphadenopathy. Pelvic findings: Sensitivity is slightly diminished due to streak artifact from bilateral hip prostheses. Small focus of air in the urinary bladder may be secondary to recent instrumentation. There is sigmoid diverticulosis without diverticulitis. Colon is normal in course and caliber with moderate stool volume. Appendix is surgically absent. There is no free air or free fluid. IMPRESSION: 1. Sigmoid diverticulosis. Moderate stool volume in the colon. No evidence of diverticulitis, colitis or bowel obstruction. 2.. Few tiny nonobstructing right renal or calculi. No left renal calculus. No hydronephrosis. 3. Cholecystectomy and appendectomy. 4. Tiny focus of air in the urinary bladder, nonspecific but could be secondary to instrumentation. Abdon Paredes        SARS-CoV-2 Lab Results  \"Novel Coronavirus\" Test: No results found for: COV2NT   \"Emergent Disease\" Test: No results found for: EDPR  \"SARS-COV-2\" Test: No results found for: XGCOVT  As of: 6:35 AM on 5/22/2020        Assessment and Plan:     Hospital Problems as of 5/22/2020 Date Reviewed: 2/12/2016          Codes Class Noted - Resolved POA    * (Principal) Chronic abdominal pain (Chronic) ICD-10-CM: R10.9, G89.29  ICD-9-CM: 789.00, 338.29  5/22/2020 - Present Yes        [place] (Chronic) ICD-10-CM: E27.1  ICD-9-CM: 255.41  12/10/2011 - Present Yes              Plan:  I do not want to admit pt without clear indication as I suspect she may just want IV narcotics. If she is agreeable to a CT scan we can check for other bowel pathology eg obstruction or anything that would justify admission. She expressed irritation that I wanted to get a CT. If CT negative she can go home and follow up with her GI doc     She may need enema if no pathology found. Large amts of stool in colon likely from narcotic use.    Pt said she did not want here and if enema indicated she would do herself at home

## 2020-05-22 NOTE — PROGRESS NOTES
Patient contacted regarding recent discharge and COVID-19 risk   Care Transition Nurse/ Ambulatory Care Manager contacted the patient by telephone to perform post discharge assessment. Verified name and  with patient as identifiers. Patient has following risk factors of: asthma. ACM reviewed discharge instructions, medical action plan and red flags related to discharge diagnosis. Reviewed and educated them on any new and changed medications related to discharge diagnosis. Advised obtaining a 90-day supply of all daily and as-needed medications. Education provided regarding infection prevention, and signs and symptoms of COVID-19 and when to seek medical attention with patient who verbalized understanding. Discussed exposure protocols and quarantine from [address] you at higher risk for severe illness  and given an opportunity for questions and concerns. The patient agrees to contact the COVID-19 hotline [phone] or PCP office for questions related to their healthcare. CTN/ACM provided contact information for future reference. From CDC: Are you at higher risk for severe illness?  Wash your hands often.  Avoid close contact (6 feet, which is about two arm lengths) with people who are sick.  Put distance between yourself and other people if COVID-19 is spreading in your community.  Clean and disinfect frequently touched surfaces.  Avoid all cruise travel and non-essential air travel.  Call your healthcare professional if you have concerns about COVID-19 and your underlying condition or if you are sick. For more information on steps you can take to protect yourself, see CDC's How to Protect Yourself      Patient/family/caregiver given information for Ellen Conner and agrees to enroll no    Plan for follow-up call in 7-14 days based on severity of symptoms and risk factors.

## 2020-05-22 NOTE — ED NOTES
I have reviewed discharge instructions with the patient. The patient verbalized understanding. Patient left ED via Discharge Method: ambulatory to Home with mother. Opportunity for questions and clarification provided. Patient given 0 scripts. To continue your aftercare when you leave the hospital, you may receive an automated call from our care team to check in on how you are doing. This is a free service and part of our promise to provide the best care and service to meet your aftercare needs.  If you have questions, or wish to unsubscribe from this service please call [phone]. Thank you for Choosing our [facility] Emergency Department.

## 2020-05-22 NOTE — DISCHARGE INSTRUCTIONS
Patient Education        Abdominal Pain: Care Instructions  Your Care Instructions    Abdominal pain has many possible causes. Some aren't serious and get better on their own in a few days. Others need more testing and treatment. If your pain continues or gets worse, you need to be rechecked and may need more tests to find out what is wrong. You may need surgery to correct the problem. Don't ignore new symptoms, such as fever, nausea and vomiting, urination problems, pain that gets worse, and dizziness. These may be signs of a more serious problem. Your doctor may have recommended a follow-up visit in the next 8 to 12 hours. If you are not getting better, you may need more tests or treatment. The doctor has checked you carefully, but problems can develop later. If you notice any problems or new symptoms, get medical treatment right away. Follow-up care is a key part of your treatment and safety. Be sure to make and go to all appointments, and call your doctor if you are having problems. It's also a good idea to know your test results and keep a list of the medicines you take. How can you care for yourself at home? · Rest until you feel better. · To prevent dehydration, drink plenty of fluids, enough so that your urine is light yellow or clear like water. Choose water and other caffeine-free clear liquids until you feel better. If you have kidney, heart, or liver disease and have to limit fluids, talk with your doctor before you increase the amount of fluids you drink. · If your stomach is upset, eat mild foods, such as rice, dry toast or crackers, bananas, and applesauce. Try eating several small meals instead of two or three large ones. · Wait until 48 hours after all symptoms have gone away before you have spicy foods, alcohol, and drinks that contain caffeine. · Do not eat foods that are high in fat. · Avoid anti-inflammatory medicines such as aspirin, ibuprofen (Advil, Motrin), and naproxen (Aleve). These can cause stomach upset. Talk to your doctor if you take daily aspirin for another health problem. When should you call for help? Call 911 anytime you think you may need emergency care. For example, call if:    · You passed out (lost consciousness).     · You pass maroon or very bloody stools.     · You vomit blood or what looks like coffee grounds.     · You have new, severe belly pain.    Call your doctor now or seek immediate medical care if:    · Your pain gets worse, especially if it becomes focused in one area of your belly.     · You have a new or higher fever.     · Your stools are black and look like tar, or they have streaks of blood.     · You have unexpected vaginal bleeding.     · You have symptoms of a urinary tract infection. These may include:  ? Pain when you urinate. ? Urinating more often than usual.  ? Blood in your urine.     · You are dizzy or lightheaded, or you feel like you may faint.    Watch closely for changes in your health, and be sure to contact your doctor if:    · You are not getting better after 1 day (24 hours). Where can you learn more? Go to http://fidelRichard Toland Designsjill.info/  Enter P226 in the search box to learn more about \"Abdominal Pain: Care Instructions. \"  Current as of: June 26, 2019Content Version: 12.4  © 4536-1186 Healthwise, Incorporated. Care instructions adapted under license by [employer] (which disclaims liability or warranty for this information). If you have questions about a medical condition or this instruction, always ask your healthcare professional. [place] [address] any warranty or liability for your use of this information. Patient Education        Nausea and Vomiting: Care Instructions  Your Care Instructions    When you are nauseated, you may feel weak and sweaty and notice a lot of saliva in your mouth. Nausea often leads to vomiting.  Most of the time you do not need to worry about nausea and vomiting, but they can be signs of other illnesses. Two common causes of nausea and vomiting are stomach flu and food poisoning. Nausea and vomiting from viral stomach flu will usually start to improve within 24 hours. Nausea and vomiting from food poisoning may last from 12 to 48 hours. The doctor has checked you carefully, but problems can develop later. If you notice any problems or new symptoms, get medical treatment right away. Follow-up care is a key part of your treatment and safety. Be sure to make and go to all appointments, and call your doctor if you are having problems. It's also a good idea to know your test results and keep a list of the medicines you take. How can you care for yourself at home? · To prevent dehydration, drink plenty of fluids, enough so that your urine is light yellow or clear like water. Choose water and other caffeine-free clear liquids until you feel better. If you have kidney, heart, or liver disease and have to limit fluids, talk with your doctor before you increase the amount of fluids you drink. · Rest in bed until you feel better. · When you are able to eat, try clear soups, mild foods, and liquids until all symptoms are gone for 12 to 48 hours. Other good choices include dry toast, crackers, cooked cereal, and gelatin dessert, such as Jell-O. When should you call for help? Call 911 anytime you think you may need emergency care. For example, call if:    · You passed out (lost consciousness).    Call your doctor now or seek immediate medical care if:    · You have symptoms of dehydration, such as:  ? Dry eyes and a dry mouth. ? Passing only a little dark urine. ?  Feeling thirstier than usual.     · You have new or worsening belly pain.     · You have a new or higher fever.     · You vomit blood or what looks like coffee grounds.    Watch closely for changes in your health, and be sure to contact your doctor if:    · You have ongoing nausea and vomiting.     · Your vomiting is getting worse.     · Your vomiting lasts longer than 2 days.     · You are not getting better as expected. Where can you learn more? Go to http://fidel-jill.info/  Enter H591 in the search box to learn more about \"Nausea and Vomiting: Care Instructions. \"  Current as of: June 26, 2019Content Version: 12.4  © 7401-5396 Healthwise, [employer]. Care instructions adapted under license by [employer] (which disclaims liability or warranty for this information). If you have questions about a medical condition or this instruction, always ask your healthcare professional. [place] [address] any warranty or liability for your use of this information.

## 2020-06-05 ENCOUNTER — PATIENT OUTREACH (OUTPATIENT)
Dept: CASE MANAGEMENT | Age: 40
End: 2020-06-05

## 2020-06-05 NOTE — PROGRESS NOTES
Patient resolved from Transition of Care episode on 6/5/20. Patient/family has been provided the following resources and education related to COVID-19:                         Signs, symptoms and red flags related to COVID-19            CDC exposure and quarantine guidelines            Conduit exposure contact - [phone]            Contact for their local Department of Health                 Patient currently reports that the following symptoms continue and she has appt w/ GI spec:  pain or aching joints, nausea and vomiting. No further outreach scheduled with this ACM. Episode of Care resolved. Patient has this ACM  contact information if future needs arise.

## 2020-06-28 ENCOUNTER — HOSPITAL ENCOUNTER (EMERGENCY)
Age: 40
Discharge: HOME | End: 2020-06-28
Attending: EMERGENCY MEDICINE
Payer: COMMERCIAL

## 2020-06-28 ENCOUNTER — APPOINTMENT (OUTPATIENT)
Dept: GENERAL RADIOLOGY | Age: 40
End: 2020-06-28
Attending: EMERGENCY MEDICINE
Payer: COMMERCIAL

## 2020-06-28 VITALS
HEIGHT: 64 IN | DIASTOLIC BLOOD PRESSURE: 91 MMHG | SYSTOLIC BLOOD PRESSURE: 151 MMHG | BODY MASS INDEX: 28.17 KG/M2 | RESPIRATION RATE: 16 BRPM | WEIGHT: 165 LBS | OXYGEN SATURATION: 91 % | HEART RATE: 112 BPM

## 2020-06-28 DIAGNOSIS — T07.XXXA MULTIPLE CONTUSIONS: Primary | ICD-10-CM

## 2020-06-28 LAB
ALBUMIN SERPL-MCNC: 3.4 G/DL (ref 3.5–5)
ALBUMIN/GLOB SERPL: 0.9 {RATIO} (ref 1.2–3.5)
ALP SERPL-CCNC: 54 U/L (ref 50–136)
ALT SERPL-CCNC: 29 U/L (ref 12–65)
ANION GAP SERPL CALC-SCNC: 8 MMOL/L (ref 7–16)
AST SERPL-CCNC: 20 U/L (ref 15–37)
BASOPHILS # BLD: 0 K/UL (ref 0–0.2)
BASOPHILS NFR BLD: 0 % (ref 0–2)
BILIRUB SERPL-MCNC: 0.3 MG/DL (ref 0.2–1.1)
BUN SERPL-MCNC: 9 MG/DL (ref 6–23)
CALCIUM SERPL-MCNC: 9.2 MG/DL (ref 8.3–10.4)
CHLORIDE SERPL-SCNC: 100 MMOL/L (ref 98–107)
CO2 SERPL-SCNC: 28 MMOL/L (ref 21–32)
CREAT SERPL-MCNC: 0.94 MG/DL (ref 0.6–1)
CRP SERPL-MCNC: 0.8 MG/DL (ref 0–0.9)
DIFFERENTIAL METHOD BLD: ABNORMAL
EOSINOPHIL # BLD: 0 K/UL (ref 0–0.8)
EOSINOPHIL NFR BLD: 0 % (ref 0.5–7.8)
ERYTHROCYTE [DISTWIDTH] IN BLOOD BY AUTOMATED COUNT: 13.8 % (ref 11.9–14.6)
GLOBULIN SER CALC-MCNC: 3.6 G/DL (ref 2.3–3.5)
GLUCOSE SERPL-MCNC: 124 MG/DL (ref 65–100)
HCT VFR BLD AUTO: 40 % (ref 35.8–46.3)
HGB BLD-MCNC: 12.8 G/DL (ref 11.7–15.4)
IMM GRANULOCYTES # BLD AUTO: 0.2 K/UL (ref 0–0.5)
IMM GRANULOCYTES NFR BLD AUTO: 1 % (ref 0–5)
LDH SERPL L TO P-CCNC: 176 U/L (ref 100–190)
LIPASE SERPL-CCNC: 108 U/L (ref 73–393)
LYMPHOCYTES # BLD: 1.6 K/UL (ref 0.5–4.6)
LYMPHOCYTES NFR BLD: 10 % (ref 13–44)
MCH RBC QN AUTO: 30 PG (ref 26.1–32.9)
MCHC RBC AUTO-ENTMCNC: 32 G/DL (ref 31.4–35)
MCV RBC AUTO: 93.9 FL (ref 79.6–97.8)
MONOCYTES # BLD: 0.5 K/UL (ref 0.1–1.3)
MONOCYTES NFR BLD: 3 % (ref 4–12)
NEUTS SEG # BLD: 13.5 K/UL (ref 1.7–8.2)
NEUTS SEG NFR BLD: 85 % (ref 43–78)
NRBC # BLD: 0 K/UL (ref 0–0.2)
PLATELET # BLD AUTO: 274 K/UL (ref 150–450)
PMV BLD AUTO: 9.1 FL (ref 9.4–12.3)
POTASSIUM SERPL-SCNC: 3.2 MMOL/L (ref 3.5–5.1)
PROT SERPL-MCNC: 7 G/DL (ref 6.3–8.2)
RBC # BLD AUTO: 4.26 M/UL (ref 4.05–5.2)
SODIUM SERPL-SCNC: 136 MMOL/L (ref 136–145)
WBC # BLD AUTO: 15.8 K/UL (ref 4.3–11.1)

## 2020-06-28 PROCEDURE — 85025 COMPLETE CBC W/AUTO DIFF WBC: CPT

## 2020-06-28 PROCEDURE — 83615 LACTATE (LD) (LDH) ENZYME: CPT

## 2020-06-28 PROCEDURE — 74011250636 HC RX REV CODE- 250/636: Performed by: EMERGENCY MEDICINE

## 2020-06-28 PROCEDURE — 83690 ASSAY OF LIPASE: CPT

## 2020-06-28 PROCEDURE — 80053 COMPREHEN METABOLIC PANEL: CPT

## 2020-06-28 PROCEDURE — 71046 X-RAY EXAM CHEST 2 VIEWS: CPT

## 2020-06-28 PROCEDURE — 73110 X-RAY EXAM OF WRIST: CPT

## 2020-06-28 PROCEDURE — 86140 C-REACTIVE PROTEIN: CPT

## 2020-06-28 PROCEDURE — 73030 X-RAY EXAM OF SHOULDER: CPT

## 2020-06-28 PROCEDURE — 81003 URINALYSIS AUTO W/O SCOPE: CPT

## 2020-06-28 PROCEDURE — 99284 EMERGENCY DEPT VISIT MOD MDM: CPT

## 2020-06-28 PROCEDURE — 96374 THER/PROPH/DIAG INJ IV PUSH: CPT

## 2020-06-28 PROCEDURE — 74011250637 HC RX REV CODE- 250/637: Performed by: EMERGENCY MEDICINE

## 2020-06-28 RX ORDER — OXYCODONE HCL 10 MG/1
40 TABLET, FILM COATED, EXTENDED RELEASE ORAL ONCE
Status: COMPLETED | OUTPATIENT
Start: 2020-06-28 | End: 2020-06-28

## 2020-06-28 RX ORDER — ACETAMINOPHEN 500 MG
1000 TABLET ORAL
Status: DISCONTINUED | OUTPATIENT
Start: 2020-06-28 | End: 2020-06-28 | Stop reason: HOSPADM

## 2020-06-28 RX ORDER — ONDANSETRON 2 MG/ML
4 INJECTION INTRAMUSCULAR; INTRAVENOUS
Status: COMPLETED | OUTPATIENT
Start: 2020-06-28 | End: 2020-06-28

## 2020-06-28 RX ADMIN — OXYCODONE HYDROCHLORIDE 40 MG: 10 TABLET, FILM COATED, EXTENDED RELEASE ORAL at 13:46

## 2020-06-28 RX ADMIN — ONDANSETRON 4 MG: 2 INJECTION INTRAMUSCULAR; INTRAVENOUS at 13:49
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
## 2020-06-28 NOTE — ED NOTES
I have reviewed discharge instructions with the patient. The patient verbalized understanding. Patient left ED via Discharge Method: wheelchair to Home with mother. Opportunity for questions and clarification provided. Patient given 0 scripts. To continue your aftercare when you leave the hospital, you may receive an automated call from our care team to check in on how you are doing. This is a free service and part of our promise to provide the best care and service to meet your aftercare needs.  If you have questions, or wish to unsubscribe from this service please call [phone]. Thank you for Choosing our [address] [place] [address] Emergency Department.

## 2020-06-29 ENCOUNTER — PATIENT OUTREACH (OUTPATIENT)
Dept: CASE MANAGEMENT | Age: 40
End: 2020-06-29

## 2020-06-29 NOTE — PROGRESS NOTES
Pt requested call back in 1hr. Patient contacted regarding recent discharge and COVID-19 risk. Discussed COVID-19 related testing which was not done at this time. Test results were not done. Ambulatory Care Manager contacted the patient by telephone to perform post discharge assessment. Verified name and  with patient as identifiers. Patient has following risk factors of: asthma. ACM reviewed discharge instructions, medical action plan and red flags related to discharge diagnosis. Reviewed and educated them on any new and changed medications related to discharge diagnosis. Advised obtaining a 90-day supply of all daily and as-needed medications. Education provided regarding infection prevention, and signs and symptoms of COVID-19 and when to seek medical attention with patient who verbalized understanding. Discussed exposure protocols and quarantine from [address] you at higher risk for severe illness  and given an opportunity for questions and concerns. The patient agrees to contact the COVID-19 hotline [phone] or PCP office for questions related to their healthcare. CTN/ACM provided contact information for future reference. From CDC: Are you at higher risk for severe illness?  Wash your hands often.  Avoid close contact (6 feet, which is about two arm lengths) with people who are sick.  Put distance between yourself and other people if COVID-19 is spreading in your community.  Clean and disinfect frequently touched surfaces.  Avoid all cruise travel and non-essential air travel.  Call your healthcare professional if you have concerns about COVID-19 and your underlying condition or if you are sick.     For more information on steps you can take to protect yourself, see CDC's How to Protect Yourself      Patient/family/caregiver given information for Ellen Conner and agrees to enroll no    Plan for follow-up call in 7-14 days based on severity of symptoms and risk factors.

## 2020-07-14 ENCOUNTER — PATIENT OUTREACH (OUTPATIENT)
Dept: CASE MANAGEMENT | Age: 40
End: 2020-07-14

## 2020-07-14 NOTE — PROGRESS NOTES
RNCM attempted 14d f/u, unable to leave vm, episode will be resolved at this time. This note will not be viewable in [address]

## 2020-08-14 ENCOUNTER — HOSPITAL ENCOUNTER (INPATIENT)
Age: 40
LOS: 1 days | Discharge: HOME | DRG: 282 | End: 2020-08-16
Attending: EMERGENCY MEDICINE | Admitting: INTERNAL MEDICINE
Payer: COMMERCIAL

## 2020-08-14 DIAGNOSIS — R10.13 ACUTE EPIGASTRIC PAIN: Primary | ICD-10-CM

## 2020-08-14 DIAGNOSIS — R11.2 NAUSEA VOMITING AND DIARRHEA: ICD-10-CM

## 2020-08-14 DIAGNOSIS — R19.7 NAUSEA VOMITING AND DIARRHEA: ICD-10-CM

## 2020-08-14 PROBLEM — R10.9 ABDOMINAL PAIN: Status: ACTIVE | Noted: 2020-08-14

## 2020-08-14 PROBLEM — K85.90 PANCREATITIS: Status: ACTIVE | Noted: 2020-08-14

## 2020-08-14 LAB
ALBUMIN SERPL-MCNC: 3.3 G/DL (ref 3.5–5)
ALBUMIN/GLOB SERPL: 0.9 {RATIO} (ref 1.2–3.5)
ALP SERPL-CCNC: 43 U/L (ref 50–136)
ALT SERPL-CCNC: 31 U/L (ref 12–65)
ANION GAP SERPL CALC-SCNC: 8 MMOL/L (ref 7–16)
AST SERPL-CCNC: 29 U/L (ref 15–37)
BACTERIA URNS QL MICRO: 0 /HPF
BASOPHILS # BLD: 0 K/UL (ref 0–0.2)
BASOPHILS NFR BLD: 0 % (ref 0–2)
BILIRUB SERPL-MCNC: 0.4 MG/DL (ref 0.2–1.1)
BUN SERPL-MCNC: 11 MG/DL (ref 6–23)
CALCIUM SERPL-MCNC: 9 MG/DL (ref 8.3–10.4)
CASTS URNS QL MICRO: 0 /LPF
CHLORIDE SERPL-SCNC: 102 MMOL/L (ref 98–107)
CO2 SERPL-SCNC: 31 MMOL/L (ref 21–32)
CREAT SERPL-MCNC: 0.96 MG/DL (ref 0.6–1)
DIFFERENTIAL METHOD BLD: ABNORMAL
EOSINOPHIL # BLD: 0 K/UL (ref 0–0.8)
EOSINOPHIL NFR BLD: 0 % (ref 0.5–7.8)
EPI CELLS #/AREA URNS HPF: NORMAL /HPF
ERYTHROCYTE [DISTWIDTH] IN BLOOD BY AUTOMATED COUNT: 13.6 % (ref 11.9–14.6)
GLOBULIN SER CALC-MCNC: 3.6 G/DL (ref 2.3–3.5)
GLUCOSE SERPL-MCNC: 108 MG/DL (ref 65–100)
HCT VFR BLD AUTO: 39.7 % (ref 35.8–46.3)
HGB BLD-MCNC: 12.6 G/DL (ref 11.7–15.4)
IMM GRANULOCYTES # BLD AUTO: 0.1 K/UL (ref 0–0.5)
IMM GRANULOCYTES NFR BLD AUTO: 1 % (ref 0–5)
LIPASE SERPL-CCNC: 70 U/L (ref 73–393)
LYMPHOCYTES # BLD: 1.2 K/UL (ref 0.5–4.6)
LYMPHOCYTES NFR BLD: 10 % (ref 13–44)
MCH RBC QN AUTO: 30.6 PG (ref 26.1–32.9)
MCHC RBC AUTO-ENTMCNC: 31.7 G/DL (ref 31.4–35)
MCV RBC AUTO: 96.4 FL (ref 79.6–97.8)
MONOCYTES # BLD: 0.4 K/UL (ref 0.1–1.3)
MONOCYTES NFR BLD: 3 % (ref 4–12)
NEUTS SEG # BLD: 10.5 K/UL (ref 1.7–8.2)
NEUTS SEG NFR BLD: 86 % (ref 43–78)
NRBC # BLD: 0 K/UL (ref 0–0.2)
PLATELET # BLD AUTO: 278 K/UL (ref 150–450)
PMV BLD AUTO: 9 FL (ref 9.4–12.3)
POTASSIUM SERPL-SCNC: 3.5 MMOL/L (ref 3.5–5.1)
PROT SERPL-MCNC: 6.9 G/DL (ref 6.3–8.2)
RBC # BLD AUTO: 4.12 M/UL (ref 4.05–5.2)
RBC #/AREA URNS HPF: NORMAL /HPF
SODIUM SERPL-SCNC: 141 MMOL/L (ref 136–145)
WBC # BLD AUTO: 12.1 K/UL (ref 4.3–11.1)
WBC URNS QL MICRO: NORMAL /HPF

## 2020-08-14 PROCEDURE — 99218 HC RM OBSERVATION: CPT

## 2020-08-14 PROCEDURE — 83690 ASSAY OF LIPASE: CPT

## 2020-08-14 PROCEDURE — 74011250637 HC RX REV CODE- 250/637: Performed by: FAMILY MEDICINE

## 2020-08-14 PROCEDURE — 96375 TX/PRO/DX INJ NEW DRUG ADDON: CPT

## 2020-08-14 PROCEDURE — 80053 COMPREHEN METABOLIC PANEL: CPT

## 2020-08-14 PROCEDURE — 99283 EMERGENCY DEPT VISIT LOW MDM: CPT

## 2020-08-14 PROCEDURE — 74011250636 HC RX REV CODE- 250/636: Performed by: FAMILY MEDICINE

## 2020-08-14 PROCEDURE — 96361 HYDRATE IV INFUSION ADD-ON: CPT

## 2020-08-14 PROCEDURE — 96374 THER/PROPH/DIAG INJ IV PUSH: CPT

## 2020-08-14 PROCEDURE — 74011250636 HC RX REV CODE- 250/636: Performed by: INTERNAL MEDICINE

## 2020-08-14 PROCEDURE — 85025 COMPLETE CBC W/AUTO DIFF WBC: CPT

## 2020-08-14 PROCEDURE — 74011250636 HC RX REV CODE- 250/636: Performed by: EMERGENCY MEDICINE

## 2020-08-14 PROCEDURE — 96376 TX/PRO/DX INJ SAME DRUG ADON: CPT

## 2020-08-14 PROCEDURE — 81015 MICROSCOPIC EXAM OF URINE: CPT

## 2020-08-14 RX ORDER — ONDANSETRON 2 MG/ML
4 INJECTION INTRAMUSCULAR; INTRAVENOUS
Status: COMPLETED | OUTPATIENT
Start: 2020-08-14 | End: 2020-08-14

## 2020-08-14 RX ORDER — SODIUM CHLORIDE 9 MG/ML
100 INJECTION, SOLUTION INTRAVENOUS CONTINUOUS
Status: DISCONTINUED | OUTPATIENT
Start: 2020-08-14 | End: 2020-08-16 | Stop reason: HOSPADM

## 2020-08-14 RX ORDER — HYDROCORTISONE SODIUM SUCCINATE 100 MG/2ML
125 INJECTION, POWDER, FOR SOLUTION INTRAMUSCULAR; INTRAVENOUS
Status: COMPLETED | OUTPATIENT
Start: 2020-08-14 | End: 2020-08-14

## 2020-08-14 RX ORDER — ACETAMINOPHEN 650 MG/1
650 SUPPOSITORY RECTAL
Status: DISCONTINUED | OUTPATIENT
Start: 2020-08-14 | End: 2020-08-16 | Stop reason: HOSPADM

## 2020-08-14 RX ORDER — POLYETHYLENE GLYCOL 3350 17 G/17G
17 POWDER, FOR SOLUTION ORAL DAILY PRN
Status: DISCONTINUED | OUTPATIENT
Start: 2020-08-14 | End: 2020-08-16 | Stop reason: HOSPADM

## 2020-08-14 RX ORDER — ONDANSETRON 2 MG/ML
4 INJECTION INTRAMUSCULAR; INTRAVENOUS ONCE
Status: COMPLETED | OUTPATIENT
Start: 2020-08-14 | End: 2020-08-14

## 2020-08-14 RX ORDER — SODIUM CHLORIDE 0.9 % (FLUSH) 0.9 %
5-40 SYRINGE (ML) INJECTION EVERY 8 HOURS
Status: DISCONTINUED | OUTPATIENT
Start: 2020-08-14 | End: 2020-08-16 | Stop reason: HOSPADM

## 2020-08-14 RX ORDER — SODIUM CHLORIDE, SODIUM LACTATE, POTASSIUM CHLORIDE, CALCIUM CHLORIDE 600; 310; 30; 20 MG/100ML; MG/100ML; MG/100ML; MG/100ML
1000 INJECTION, SOLUTION INTRAVENOUS ONCE
Status: COMPLETED | OUTPATIENT
Start: 2020-08-14 | End: 2020-08-14

## 2020-08-14 RX ORDER — HYDROMORPHONE HYDROCHLORIDE 1 MG/ML
2 INJECTION, SOLUTION INTRAMUSCULAR; INTRAVENOUS; SUBCUTANEOUS
Status: COMPLETED | OUTPATIENT
Start: 2020-08-14 | End: 2020-08-14

## 2020-08-14 RX ORDER — ONDANSETRON 2 MG/ML
4 INJECTION INTRAMUSCULAR; INTRAVENOUS
Status: DISCONTINUED | OUTPATIENT
Start: 2020-08-14 | End: 2020-08-15

## 2020-08-14 RX ORDER — PROMETHAZINE HYDROCHLORIDE 25 MG/1
12.5 TABLET ORAL
Status: DISCONTINUED | OUTPATIENT
Start: 2020-08-14 | End: 2020-08-14

## 2020-08-14 RX ORDER — SODIUM CHLORIDE 0.9 % (FLUSH) 0.9 %
5-40 SYRINGE (ML) INJECTION AS NEEDED
Status: DISCONTINUED | OUTPATIENT
Start: 2020-08-14 | End: 2020-08-16 | Stop reason: HOSPADM

## 2020-08-14 RX ORDER — MONTELUKAST SODIUM 10 MG/1
10 TABLET ORAL EVERY EVENING
Status: DISCONTINUED | OUTPATIENT
Start: 2020-08-14 | End: 2020-08-16 | Stop reason: HOSPADM

## 2020-08-14 RX ORDER — ENOXAPARIN SODIUM 100 MG/ML
40 INJECTION SUBCUTANEOUS EVERY 24 HOURS
Status: DISCONTINUED | OUTPATIENT
Start: 2020-08-14 | End: 2020-08-16 | Stop reason: HOSPADM

## 2020-08-14 RX ORDER — HYDROMORPHONE HYDROCHLORIDE 1 MG/ML
1 INJECTION, SOLUTION INTRAMUSCULAR; INTRAVENOUS; SUBCUTANEOUS ONCE
Status: COMPLETED | OUTPATIENT
Start: 2020-08-14 | End: 2020-08-14

## 2020-08-14 RX ORDER — ACETAMINOPHEN 325 MG/1
650 TABLET ORAL
Status: DISCONTINUED | OUTPATIENT
Start: 2020-08-14 | End: 2020-08-16 | Stop reason: HOSPADM

## 2020-08-14 RX ORDER — HYDROMORPHONE HYDROCHLORIDE 1 MG/ML
1 INJECTION, SOLUTION INTRAMUSCULAR; INTRAVENOUS; SUBCUTANEOUS
Status: DISCONTINUED | OUTPATIENT
Start: 2020-08-14 | End: 2020-08-15

## 2020-08-14 RX ADMIN — Medication 1 AMPULE: at 21:11

## 2020-08-14 RX ADMIN — SODIUM CHLORIDE 100 ML/HR: 900 INJECTION, SOLUTION INTRAVENOUS at 21:15

## 2020-08-14 RX ADMIN — ONDANSETRON 4 MG: 2 INJECTION INTRAMUSCULAR; INTRAVENOUS at 17:38

## 2020-08-14 RX ADMIN — HYDROMORPHONE HYDROCHLORIDE 1 MG: 1 INJECTION, SOLUTION INTRAMUSCULAR; INTRAVENOUS; SUBCUTANEOUS at 21:00

## 2020-08-14 RX ADMIN — HYDROMORPHONE HYDROCHLORIDE 2 MG: 1 INJECTION, SOLUTION INTRAMUSCULAR; INTRAVENOUS; SUBCUTANEOUS at 16:43

## 2020-08-14 RX ADMIN — ONDANSETRON 4 MG: 2 INJECTION INTRAMUSCULAR; INTRAVENOUS at 16:43

## 2020-08-14 RX ADMIN — SODIUM CHLORIDE, SODIUM LACTATE, POTASSIUM CHLORIDE, AND CALCIUM CHLORIDE 1000 ML: 600; 310; 30; 20 INJECTION, SOLUTION INTRAVENOUS at 16:43

## 2020-08-14 RX ADMIN — HYDROMORPHONE HYDROCHLORIDE 2 MG: 1 INJECTION, SOLUTION INTRAMUSCULAR; INTRAVENOUS; SUBCUTANEOUS at 17:36

## 2020-08-14 RX ADMIN — ONDANSETRON 4 MG: 2 INJECTION INTRAMUSCULAR; INTRAVENOUS at 21:11

## 2020-08-14 RX ADMIN — Medication 20 ML: at 17:44

## 2020-08-14 RX ADMIN — HYDROCORTISONE SODIUM SUCCINATE 125 MG: 100 INJECTION, POWDER, FOR SOLUTION INTRAMUSCULAR; INTRAVENOUS at 17:00

## 2020-08-14 NOTE — ED NOTES
Patient advises that she had a bronchoscopy earlier this week at [facility] and now complaining of right upper abdominal pain with nausea, vomiting and diarrhea. Patient with mask on during triage. Patient advises she believes her pancreatitis is flared up.

## 2020-08-14 NOTE — ED PROVIDER NOTES
This is a 57-year-old female who suffers from chronic pancreatitis. 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 She has had cholecystectomy, hysterectomy, appendectomy and sphincterotomy. Patient had bronchoscopy but due to her asthma and problems like that 3 days ago. 48 hours ago she started with nausea vomiting diarrhea and epigastric and right upper quadrant pain similar to previous bouts. No hematemesis. States she has had a temperature up to 99. No cough or URI symptoms. No dysuria. Patient has a history of adrenal insufficiency and takes Cortef daily. Unable to keep that down on her chronic pain medications which includes OxyContin, 30 mg twice daily and 4 mg Dilaudid p.o. 4 times daily. The history is provided by the patient. Abdominal Pain    This is a new problem. The current episode started more than 2 days ago. The problem occurs constantly. The problem has not changed since onset. The pain is associated with vomiting. The pain is located in the epigastric region and RUQ. The quality of the pain is dull and aching. The pain is moderate. Associated symptoms include a fever (Borderline), diarrhea, nausea and vomiting. Pertinent negatives include no hematochezia, no dysuria, no frequency, no hematuria, no myalgias, no chest pain and no back pain. Nothing worsens the pain. The pain is relieved by nothing. The patient's surgical history includes appendectomy, cholecystectomy and hysterectomy.        Past Medical History:   Diagnosis Date    Arthritis     Asthma     uses MDI as needed - rarely-pt can't remember last time needed    Avascular necrosis of femur head     right hip, left hip replacements    Chronic pain     Endocrine disease diagnosed 2003    ADDISONS DISEASE    Endometriosis     hysterectomy    GERD (gastroesophageal reflux disease)     Infectious disease 2007    MRSA-2007, community acquired - in [facility]) about 6 months - 3 months of which was in coma; HPV-2009     Nausea & vomiting     Other ill-defined conditions(799.89)     allergies    Pancreatitis chronic     7 episodes    Unspecified adverse effect of anesthesia     pt reports \"difficult to sedate       Past Surgical History:   Procedure Laterality Date    HX APPENDECTOMY      HX CHOLECYSTECTOMY      HX GYN      hysterectomy    HX ORTHOPAEDIC  2010    right EDIN    HX ORTHOPAEDIC  2010    Left  EDIN    HX OTHER SURGICAL  2007    I&D of 9 abcesses -MRSA    HX SHOULDER REPLACEMENT  12/2011    Right    HX SHOULDER REPLACEMENT  2015    left    HX TONSILLECTOMY           Family History:   Problem Relation Age of Onset    Cancer Mother         THROID CANCER    Diabetes Maternal Grandmother     Stroke Maternal Grandfather     Heart Disease Paternal Grandmother        Social History     Socioeconomic History    Marital status: SINGLE     Spouse name: Not on file    Number of children: Not on file    Years of education: Not on file    Highest education level: Not on file   Occupational History    Not on file   Social Needs    Financial resource strain: Not on file    Food insecurity     Worry: Not on file     Inability: Not on file    Transportation needs     Medical: Not on file     Non-medical: Not on file   Tobacco Use    Smoking status: Never Smoker    Smokeless tobacco: Never Used   Substance and Sexual Activity    Alcohol use: No    Drug use: No    Sexual activity: Not Currently   Lifestyle    Physical activity     Days per week: Not on file     Minutes per session: Not on file    Stress: Not on file   Relationships    Social connections     Talks on phone: Not on file     Gets together: Not on file     Attends Hinduism service: Not on file     Active member of club or organization: Not on file     Attends meetings of clubs or organizations: Not on file     Relationship status: Not on file    Intimate partner violence     Fear of current or ex partner: Not on file Emotionally abused: Not on file     Physically abused: Not on file     Forced sexual activity: Not on file   Other Topics Concern    Not on file   Social History Narrative    Not on file         ALLERGIES: Latex; Peanut; Shellfish containing products; Beef containing products; Chicken derived; Chlorpromazine; Clindamycin; Dopamine receptor agonist; Gluten; Hydroxyzine pamoate; Ibuprofen; Iodine; Naproxen sodium; Nubain [nalbuphine]; Other medication; Pcn [penicillins]; Povidone-iodine; Prochlorperazine edisylate; Promethazine; Reglan [metoclopramide]; Soy; Sulfa (sulfonamide antibiotics); Toradol [ketorolac tromethamine]; Tramadol; and Zithromax [azithromycin]    Review of Systems   Constitutional: Positive for fever (Borderline). Negative for chills. Respiratory: Negative for cough and shortness of breath. Cardiovascular: Negative for chest pain. Gastrointestinal: Positive for abdominal pain, diarrhea, nausea and vomiting. Negative for blood in stool and hematochezia. Genitourinary: Negative for dysuria, frequency and hematuria. Musculoskeletal: Negative for back pain and myalgias. Vitals:    08/14/20 1436   BP: (!) 165/102   Pulse: (!) 103   Resp: 16   Temp: 99 °F (37.2 °C)   SpO2: 98%   Weight: 77.1 kg (170 lb)   Height: 5' 4\" (1.626 m)            Physical Exam  Vitals signs and nursing note reviewed. Constitutional:       General: She is not in acute distress. Appearance: She is well-developed. HENT:      Head: Normocephalic and atraumatic. Right Ear: External ear normal.      Left Ear: External ear normal.      Mouth/Throat:      Pharynx: No oropharyngeal exudate. Eyes:      Conjunctiva/sclera: Conjunctivae normal.      Pupils: Pupils are equal, round, and reactive to light. Neck:      Musculoskeletal: Normal range of motion and neck supple. Pulmonary:      Effort: No respiratory distress. Breath sounds: Normal breath sounds.    Abdominal:      General: Bowel sounds are normal.      Palpations: Abdomen is soft. There is no mass. Tenderness: There is abdominal tenderness in the right upper quadrant and epigastric area. There is no guarding or rebound. Hernia: No hernia is present. Skin:     General: Skin is warm and dry. Neurological:      Mental Status: She is alert and oriented to person, place, and time. Gait: Gait normal.      Comments: Nl speech   Psychiatric:         Speech: Speech normal.          MDM  Number of Diagnoses or Management Options  Diagnosis management comments: 3 fluids. Pain and nausea control. Suspect some element of withdrawal as patient has been throwing up medications. Will need stress dose Solu-Cortef. Check urinalysis for infection. Possibility of any imaging depending on lab results       Amount and/or Complexity of Data Reviewed  Clinical lab tests: ordered and reviewed    Risk of Complications, Morbidity, and/or Mortality  Presenting problems: moderate  Diagnostic procedures: minimal  Management options: low    Patient Progress  Patient progress: stable         Procedures    Of note, patient's SC  was queried regarding OxyContin and Dilaudid use. Prescriptions have been constant without variation for over a year.

## 2020-08-14 NOTE — ED NOTES
Two unsuccessful attempts made to start IV. Small skin tear noted to right forearm from failed attempt. Bandage applied.

## 2020-08-14 NOTE — H&P
History and Physical    Patient: Cynthia Disla MRN: [id number]  SSN: [id number]    YOB: 1980  Age: 35 y.o. Sex: female      Subjective:      Cynthia Dsila is a 35 y.o. female who came to hospital due to abdominal pain and nausea and vomiting and could not keep food down. Patient has history of chronic abdominal pain and pancreatitis. She states that her pain is similar to previous episodes. She felt nauseous and could not food down. No blood in vomiting. No blood per rectum. She felt feverish, no no chills. no definite fever. Other PMH is listed below. She has Addision's disease and could not take PO steroid now diue to vomiting. Hospitalist service is requested to admit the patient.           Past Medical History:   Diagnosis Date    Arthritis     Asthma     uses MDI as needed - rarely-pt can't remember last time needed    Avascular necrosis of femur head     right hip, left hip replacements    Chronic pain     Endocrine disease diagnosed 2003    ADDISONS DISEASE    Endometriosis     hysterectomy    GERD (gastroesophageal reflux disease)     Infectious disease 2007    MRSA-2007, community acquired - in hospital [facility]) about 6 months - 3 months of which was in coma;  HPV-2009     Nausea & vomiting     Other ill-defined conditions(799.89)     allergies    Pancreatitis chronic     7 episodes    Unspecified adverse effect of anesthesia     pt reports \"difficult to sedate     Past Surgical History:   Procedure Laterality Date    HX APPENDECTOMY      HX CHOLECYSTECTOMY      HX GYN      hysterectomy    HX ORTHOPAEDIC  2010    right EDIN    HX ORTHOPAEDIC  2010    Left  EDIN    HX OTHER SURGICAL  2007    I&D of 9 abcesses -MRSA    HX SHOULDER REPLACEMENT  12/2011    Right    HX SHOULDER REPLACEMENT  2015    left    HX TONSILLECTOMY        Family History   Problem Relation Age of Onset    Cancer Mother         THROID CANCER    Diabetes Maternal Grandmother     Stroke Maternal Grandfather     Heart Disease Paternal Grandmother      Social History     Tobacco Use    Smoking status: Never Smoker    Smokeless tobacco: Never Used   Substance Use Topics    Alcohol use: No      Prior to Admission medications    Medication Sig Start Date End Date Taking? Authorizing Provider   hydrocortisone (CORTEF) 10 mg tablet Take 10 mg by mouth every morning. Provider, Historical   hydrocortisone (CORTEF) 5 mg tablet Take 5 mg by mouth nightly. Provider, Historical   ESTRADIOL PO Take  by mouth daily. Provider, Historical   B.infantis-B.ani-B.long-B.bifi (PROBIOTIC 4X) 10-15 mg TbEC Take 1 Tab by mouth every morning. Provider, Historical   furosemide (LASIX) 40 mg tablet Take 40 mg by mouth daily. Provider, Historical   Potassium Citrate 15 mEq TbER Take 15 mEq by mouth two (2) times a day. Laura Magana MD   lipase-protease-amylase (ZENPEP) capsule Take 1 Cap by mouth Before breakfast, lunch, dinner and at bedtime. Laura Magana MD   calcium citrate 200 mg (950 mg) tablet Take 200 mg by mouth three (3) times daily. Laura Magana MD   oxyCODONE CR (OXYCONTIN) 40 mg CR tablet Take 40 mg by mouth every twelve (12) hours. Indications: CHRONIC PAIN    Provider, Historical   omeprazole (PRILOSEC) 20 mg capsule Take 20 mg by mouth two (2) times a day. Provider, Historical   HYDROmorphone (DILAUDID) 4 mg tablet Take  by mouth every three (3) hours as needed for Pain. Laura Magana MD   montelukast (SINGULAIR) 10 mg tablet Take 10 mg by mouth every morning. Laura Magana MD   albuterol (PROVENTIL, VENTOLIN) 90 mcg/Actuation inhaler Take 2 Puffs by inhalation as needed. Laura Magana MD   Epinephrine 0.15 mg/0.15 mL PnIj by IntraMUSCular route as needed. Epi-pen as needed for allergic reaction    Laura Magana MD   ZOFRAN 8 mg Tab Take 8 mg by mouth as needed.     Laura Magana MD        Allergies   Allergen Reactions    Latex Anaphylaxis    Peanut Anaphylaxis    Shellfish Containing Products Anaphylaxis    Beef Containing Products Other (comments)     Per allergy testing    Chicken Derived Other (comments)     Per allergy testing    Chlorpromazine Other (comments)     Dystonic      Clindamycin Rash    Dopamine Receptor Agonist Other (comments)     Dystonic      Gluten Other (comments)     Per allergy testing    Hydroxyzine Pamoate Other (comments)     DYSTONIC      Ibuprofen Rash and Photosensitivity    Iodine Rash    Naproxen Sodium Rash    Nubain [Nalbuphine] Rash    Other Medication Other (comments)     Bananas, avacodo, strawberry, potatoes  --- ALL PER ALLERGY TESTING    Pcn [Penicillins] Rash    Povidone-Iodine Rash    Prochlorperazine Edisylate Other (comments)     Dystonic      Promethazine Other (comments)     Dystonic      Reglan [Metoclopramide] Anaphylaxis     dystonia    Soy Other (comments)     Per allergy testing    Sulfa (Sulfonamide Antibiotics) Rash    Toradol [Ketorolac Tromethamine] Rash    Tramadol Rash    Zithromax [Azithromycin] Rash       Review of Systems:    10-point review of systems is negative except what is mentioned in the present illness section. Objective:     Vitals:    08/14/20 1436   BP: (!) 165/102   Pulse: (!) 103   Resp: 16   Temp: 99 °F (37.2 °C)   SpO2: 98%   Weight: 77.1 kg (170 lb)   Height: 5' 4\" (1.626 m)        Physical Exam:    General:                    The patient is a pleasant female in no acute respiratory distress. alert and oriented to place, time and person   Head:                                   Normocephalic/atraumatic. Eyes:                                   No palpebral pallor or scleral icterus. ENT:                                    External auricular and nasal exam within normal limits. Mucous membranes are dry. Neck:                                   Supple, non-tender, no JVD.   Lungs: Clear to auscultation bilaterally without wheezes or crackles. No respiratory distress or accessory muscle use. Heart:                                  Regular rate and rhythm, without murmurs, rubs, or gallops. Abdomen:                  Soft, non-tender, distended due to truncal obesity with normoactive bowel sounds. Genitourinary:           No tenderness over the bladder or bilateral CVAs. Extremities:               Without clubbing, cyanosis, or edema. Skin:                                    Normal color, texture, and turgor. No rashes, lesions, or jaundice. Pulses:                      Radial and dorsalis pedis pulses present 2+ bilaterally. Capillary refill <2s. Neurologic:                CN II-XII grossly intact and symmetrical.                                               Moving all four extremities well with no focal deficits. Psychiatric:                Pleasant demeanor, appropriate affect. Alert and oriented x 3    Lab and data     Recent Results (from the past 24 hour(s))   URINE MICROSCOPIC    Collection Time: 08/14/20  3:57 PM   Result Value Ref Range    WBC 0-3 0 /hpf    RBC 0-3 0 /hpf    Epithelial cells 0-3 0 /hpf    Bacteria 0 0 /hpf    Casts 0 0 /lpf   CBC WITH AUTOMATED DIFF    Collection Time: 08/14/20  3:58 PM   Result Value Ref Range    WBC 12.1 (H) 4.3 - 11.1 K/uL    RBC 4.12 4.05 - 5.2 M/uL    HGB 12.6 11.7 - 15.4 g/dL    HCT 39.7 35.8 - 46.3 %    MCV 96.4 79.6 - 97.8 FL    MCH 30.6 26.1 - 32.9 PG    MCHC 31.7 31.4 - 35.0 g/dL    RDW 13.6 11.9 - 14.6 %    PLATELET 557 408 - 426 K/uL    MPV 9.0 (L) 9.4 - 12.3 FL    ABSOLUTE NRBC 0.00 0.0 - 0.2 K/uL    DF AUTOMATED      NEUTROPHILS 86 (H) 43 - 78 %    LYMPHOCYTES 10 (L) 13 - 44 %    MONOCYTES 3 (L) 4.0 - 12.0 %    EOSINOPHILS 0 (L) 0.5 - 7.8 %    BASOPHILS 0 0.0 - 2.0 %    IMMATURE GRANULOCYTES 1 0.0 - 5.0 %    ABS.  NEUTROPHILS 10.5 (H) 1.7 - 8.2 K/UL    ABS. LYMPHOCYTES 1.2 0.5 - 4.6 K/UL    ABS. MONOCYTES 0.4 0.1 - 1.3 K/UL    ABS. EOSINOPHILS 0.0 0.0 - 0.8 K/UL    ABS. BASOPHILS 0.0 0.0 - 0.2 K/UL    ABS. IMM. GRANS. 0.1 0.0 - 0.5 K/UL   METABOLIC PANEL, COMPREHENSIVE    Collection Time: 08/14/20  3:58 PM   Result Value Ref Range    Sodium 141 136 - 145 mmol/L    Potassium 3.5 3.5 - 5.1 mmol/L    Chloride 102 98 - 107 mmol/L    CO2 31 21 - 32 mmol/L    Anion gap 8 7 - 16 mmol/L    Glucose 108 (H) 65 - 100 mg/dL    BUN 11 6 - 23 MG/DL    Creatinine 0.96 0.6 - 1.0 MG/DL    GFR est AA >60 >60 ml/min/1.73m2    GFR est non-AA >60 >60 ml/min/1.73m2    Calcium 9.0 8.3 - 10.4 MG/DL    Bilirubin, total 0.4 0.2 - 1.1 MG/DL    ALT (SGPT) 31 12 - 65 U/L    AST (SGOT) 29 15 - 37 U/L    Alk. phosphatase 43 (L) 50 - 136 U/L    Protein, total 6.9 6.3 - 8.2 g/dL    Albumin 3.3 (L) 3.5 - 5.0 g/dL    Globulin 3.6 (H) 2.3 - 3.5 g/dL    A-G Ratio 0.9 (L) 1.2 - 3.5     LIPASE    Collection Time: 08/14/20  3:58 PM   Result Value Ref Range    Lipase 70 (L) 73 - 393 U/L           Assessment:     Hospital Problems  Date Reviewed: 2/12/2016          Codes Class Noted POA    * (Principal) Pancreatitis ICD-10-CM: K85.90  ICD-9-CM: 642.4  8/14/2020 Unknown        Abdominal pain ICD-10-CM: R10.9  ICD-9-CM: 789.00  8/14/2020 Unknown        Chronic abdominal pain (Chronic) ICD-10-CM: R10.9, G89.29  ICD-9-CM: 789.00, 338.29  5/22/2020 Yes        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 Admit to medical floor. IV fluid. Symptomatic treatments   Pain control   Give steroid IV   PPI     Chronic narcotic use.    Will continue in hospital sparingly and under close monitoring. I have discussed with patient regarding advance directive. Patient would like to have a full-code status. Healthcare power of  is mother. Patient has pain now. Pain medications are ordered and patient will be monitored and followed for response. I have discussed the plan of care with patient.       DVT prophylaxis : Lovenox SC       Signed By: Hernando Alfonso MD     August 14, 2020

## 2020-08-14 NOTE — ED NOTES
TRANSFER - OUT REPORT:    Verbal report given to Jorge Payton on [facility]  being transferred to [facility] for routine progression of care       Report consisted of patients Situation, Background, Assessment and   Recommendations(SBAR). Information from the following report(s) ED Summary, MAR and Recent Results was reviewed with the receiving nurse. Lines:   Peripheral IV 08/14/20 Left Forearm (Active)        Opportunity for questions and clarification was provided.       Patient transported with:   Registered Nurse

## 2020-08-15 PROBLEM — K85.90 ACUTE PANCREATITIS: Status: ACTIVE | Noted: 2020-08-15

## 2020-08-15 LAB
ANION GAP SERPL CALC-SCNC: 7 MMOL/L (ref 7–16)
BASOPHILS # BLD: 0 K/UL (ref 0–0.2)
BASOPHILS NFR BLD: 0 % (ref 0–2)
BUN SERPL-MCNC: 12 MG/DL (ref 6–23)
CALCIUM SERPL-MCNC: 7.8 MG/DL (ref 8.3–10.4)
CHLORIDE SERPL-SCNC: 107 MMOL/L (ref 98–107)
CO2 SERPL-SCNC: 29 MMOL/L (ref 21–32)
CREAT SERPL-MCNC: 0.84 MG/DL (ref 0.6–1)
DIFFERENTIAL METHOD BLD: ABNORMAL
EOSINOPHIL # BLD: 0 K/UL (ref 0–0.8)
EOSINOPHIL NFR BLD: 0 % (ref 0.5–7.8)
ERYTHROCYTE [DISTWIDTH] IN BLOOD BY AUTOMATED COUNT: 13.7 % (ref 11.9–14.6)
GLUCOSE SERPL-MCNC: 97 MG/DL (ref 65–100)
HCT VFR BLD AUTO: 32.6 % (ref 35.8–46.3)
HGB BLD-MCNC: 10.4 G/DL (ref 11.7–15.4)
IMM GRANULOCYTES # BLD AUTO: 0.1 K/UL (ref 0–0.5)
IMM GRANULOCYTES NFR BLD AUTO: 1 % (ref 0–5)
LYMPHOCYTES # BLD: 1.4 K/UL (ref 0.5–4.6)
LYMPHOCYTES NFR BLD: 15 % (ref 13–44)
MCH RBC QN AUTO: 30.8 PG (ref 26.1–32.9)
MCHC RBC AUTO-ENTMCNC: 31.9 G/DL (ref 31.4–35)
MCV RBC AUTO: 96.4 FL (ref 79.6–97.8)
MONOCYTES # BLD: 0.4 K/UL (ref 0.1–1.3)
MONOCYTES NFR BLD: 4 % (ref 4–12)
NEUTS SEG # BLD: 7.7 K/UL (ref 1.7–8.2)
NEUTS SEG NFR BLD: 80 % (ref 43–78)
NRBC # BLD: 0 K/UL (ref 0–0.2)
PLATELET # BLD AUTO: 222 K/UL (ref 150–450)
PMV BLD AUTO: 8.9 FL (ref 9.4–12.3)
POTASSIUM SERPL-SCNC: 3.2 MMOL/L (ref 3.5–5.1)
RBC # BLD AUTO: 3.38 M/UL (ref 4.05–5.2)
SODIUM SERPL-SCNC: 143 MMOL/L (ref 136–145)
WBC # BLD AUTO: 9.6 K/UL (ref 4.3–11.1)

## 2020-08-15 PROCEDURE — 65270000029 HC RM PRIVATE

## 2020-08-15 PROCEDURE — 36415 COLL VENOUS BLD VENIPUNCTURE: CPT

## 2020-08-15 PROCEDURE — 85025 COMPLETE CBC W/AUTO DIFF WBC: CPT

## 2020-08-15 PROCEDURE — 99218 HC RM OBSERVATION: CPT

## 2020-08-15 PROCEDURE — 96361 HYDRATE IV INFUSION ADD-ON: CPT

## 2020-08-15 PROCEDURE — 74011250636 HC RX REV CODE- 250/636: Performed by: NURSE PRACTITIONER

## 2020-08-15 PROCEDURE — 80048 BASIC METABOLIC PNL TOTAL CA: CPT

## 2020-08-15 PROCEDURE — 74011250637 HC RX REV CODE- 250/637: Performed by: FAMILY MEDICINE

## 2020-08-15 PROCEDURE — 74011250637 HC RX REV CODE- 250/637: Performed by: INTERNAL MEDICINE

## 2020-08-15 PROCEDURE — 74011250636 HC RX REV CODE- 250/636: Performed by: FAMILY MEDICINE

## 2020-08-15 PROCEDURE — 74011250637 HC RX REV CODE- 250/637: Performed by: NURSE PRACTITIONER

## 2020-08-15 PROCEDURE — 96376 TX/PRO/DX INJ SAME DRUG ADON: CPT

## 2020-08-15 PROCEDURE — 74011250636 HC RX REV CODE- 250/636: Performed by: INTERNAL MEDICINE

## 2020-08-15 RX ORDER — HYDROMORPHONE HYDROCHLORIDE 1 MG/ML
1 INJECTION, SOLUTION INTRAMUSCULAR; INTRAVENOUS; SUBCUTANEOUS
Status: DISCONTINUED | OUTPATIENT
Start: 2020-08-15 | End: 2020-08-15

## 2020-08-15 RX ORDER — HYDROMORPHONE HYDROCHLORIDE 1 MG/ML
1 INJECTION, SOLUTION INTRAMUSCULAR; INTRAVENOUS; SUBCUTANEOUS ONCE
Status: COMPLETED | OUTPATIENT
Start: 2020-08-15 | End: 2020-08-15

## 2020-08-15 RX ORDER — HYDROCORTISONE 5 MG/1
5 TABLET ORAL
Status: DISCONTINUED | OUTPATIENT
Start: 2020-08-16 | End: 2020-08-16 | Stop reason: HOSPADM

## 2020-08-15 RX ORDER — HYDROCORTISONE 5 MG/1
10 TABLET ORAL
Status: COMPLETED | OUTPATIENT
Start: 2020-08-15 | End: 2020-08-15

## 2020-08-15 RX ORDER — ONDANSETRON 2 MG/ML
4 INJECTION INTRAMUSCULAR; INTRAVENOUS
Status: DISCONTINUED | OUTPATIENT
Start: 2020-08-15 | End: 2020-08-16 | Stop reason: HOSPADM

## 2020-08-15 RX ORDER — HYDROMORPHONE HYDROCHLORIDE 1 MG/ML
1 INJECTION, SOLUTION INTRAMUSCULAR; INTRAVENOUS; SUBCUTANEOUS
Status: DISCONTINUED | OUTPATIENT
Start: 2020-08-15 | End: 2020-08-16

## 2020-08-15 RX ORDER — HYDROCORTISONE 5 MG/1
2.5 TABLET ORAL
Status: DISCONTINUED | OUTPATIENT
Start: 2020-08-16 | End: 2020-08-15

## 2020-08-15 RX ORDER — HYDROCORTISONE 5 MG/1
10 TABLET ORAL
Status: DISCONTINUED | OUTPATIENT
Start: 2020-08-16 | End: 2020-08-16 | Stop reason: HOSPADM

## 2020-08-15 RX ADMIN — ONDANSETRON 4 MG: 2 INJECTION INTRAMUSCULAR; INTRAVENOUS at 17:45

## 2020-08-15 RX ADMIN — HYDROMORPHONE HYDROCHLORIDE 1 MG: 1 INJECTION, SOLUTION INTRAMUSCULAR; INTRAVENOUS; SUBCUTANEOUS at 09:44

## 2020-08-15 RX ADMIN — Medication 1 AMPULE: at 20:40

## 2020-08-15 RX ADMIN — Medication 40 ML: at 14:00

## 2020-08-15 RX ADMIN — ONDANSETRON 4 MG: 2 INJECTION INTRAMUSCULAR; INTRAVENOUS at 21:41

## 2020-08-15 RX ADMIN — HYDROMORPHONE HYDROCHLORIDE 1 MG: 1 INJECTION, SOLUTION INTRAMUSCULAR; INTRAVENOUS; SUBCUTANEOUS at 17:46

## 2020-08-15 RX ADMIN — HYDROMORPHONE HYDROCHLORIDE 1 MG: 1 INJECTION, SOLUTION INTRAMUSCULAR; INTRAVENOUS; SUBCUTANEOUS at 05:45

## 2020-08-15 RX ADMIN — Medication 10 ML: at 21:06

## 2020-08-15 RX ADMIN — SODIUM CHLORIDE 100 ML/HR: 900 INJECTION, SOLUTION INTRAVENOUS at 17:50

## 2020-08-15 RX ADMIN — HYDROCORTISONE 10 MG: 5 TABLET ORAL at 16:17

## 2020-08-15 RX ADMIN — Medication 10 ML: at 06:00

## 2020-08-15 RX ADMIN — Medication 10 ML: at 00:28

## 2020-08-15 RX ADMIN — MONTELUKAST 10 MG: 10 TABLET, FILM COATED ORAL at 09:50

## 2020-08-15 RX ADMIN — HYDROMORPHONE HYDROCHLORIDE 1 MG: 1 INJECTION, SOLUTION INTRAMUSCULAR; INTRAVENOUS; SUBCUTANEOUS at 14:02

## 2020-08-15 RX ADMIN — ONDANSETRON 4 MG: 2 INJECTION INTRAMUSCULAR; INTRAVENOUS at 09:58

## 2020-08-15 RX ADMIN — SODIUM CHLORIDE 100 ML/HR: 900 INJECTION, SOLUTION INTRAVENOUS at 06:36

## 2020-08-15 RX ADMIN — HYDROMORPHONE HYDROCHLORIDE 1 MG: 1 INJECTION, SOLUTION INTRAMUSCULAR; INTRAVENOUS; SUBCUTANEOUS at 20:41

## 2020-08-15 RX ADMIN — ONDANSETRON 4 MG: 2 INJECTION INTRAMUSCULAR; INTRAVENOUS at 01:19

## 2020-08-15 RX ADMIN — HYDROMORPHONE HYDROCHLORIDE 1 MG: 1 INJECTION, SOLUTION INTRAMUSCULAR; INTRAVENOUS; SUBCUTANEOUS at 01:37

## 2020-08-15 RX ADMIN — Medication 1 AMPULE: at 09:45

## 2020-08-15 NOTE — PROGRESS NOTES
Hospitalist Progress Note    Subjective:   Daily Progress Note: 8/15/2020 4:06 PM    Patient presented to ER 8/14 with right upper quad pain, nausea, vomiting and diarrhea 48 hours PTA. History of chronic pancreatitis with sphincter of Oddi dysfunction. Temp to 99.0 per patient. No additional symptoms. Also with adrenal insufficiency on daily cortef. Also takes oxycontin 30 mg bid and 4 mg dilaudid q 3-4 hours prn for chronic pain and has not been able to retain them. States not retaining food or liquids. Reports she had a bronchoscopy at [facility] 3 days PTA for asthma issues. Very complicated history, is followed by [address]. 8/15:  Having unrelieved pain \"all over\" receiving far less narcotics than baseline. Takes total of 16-24 mg dilaudid and 60 mg oxycontin daily at home. Orders here currently 1 mg dilaudid q 4 hours prn SQ. States has had MRSA skin infections due to skin punctures, dilaudid being given SQ per MD direction. Also takes cortef 10 mg q am and 5 mg at 1500 daily x 17 years. \[facility]\"" Endocrinologist told her to triple dose for 3 days when under stress, currently receiving 10 mg am, 2.5 pm.  Increased to baseline dose today. Tearful, shaky, concern for withdrawls. Longstanding, multiple issues cared for by multiple institutions and providers. Nausea and anorexia, does not want diet advanced. Home meds not reconciled on admission. Same done, ordered when able to take. ADDITIONAL HISTORY:  Cholecystectomy, hysterectomy, appendectomy, chronic pain on daily high dose narcotics, daily steroids for adrenal insufficiency, Asburgers syndrome,  OA, Severe Asthma, AVN, history of MRSA bacteremia, chronic pancreatitis x 7,  Multiple abscesses, multiple joint replacements.  +  Current Facility-Administered Medications   Medication Dose Route Frequency    HYDROmorphone (PF) (DILAUDID) injection 1 mg  1 mg IntraVENous Q4H PRN    hydrocortisone (CORTEF) tablet 10 mg  10 mg Oral NOW    sodium chloride (NS) flush 5-40 mL  5-40 mL IntraVENous Q8H    sodium chloride (NS) flush 5-40 mL  5-40 mL IntraVENous PRN    acetaminophen (TYLENOL) tablet 650 mg  650 mg Oral Q6H PRN    Or    acetaminophen (TYLENOL) suppository 650 mg  650 mg Rectal Q6H PRN    polyethylene glycol (MIRALAX) packet 17 g  17 g Oral DAILY PRN    ondansetron (ZOFRAN) injection 4 mg  4 mg IntraVENous Q6H PRN    enoxaparin (LOVENOX) injection 40 mg  40 mg SubCUTAneous Q24H    0.9% sodium chloride infusion  100 mL/hr IntraVENous CONTINUOUS    montelukast (SINGULAIR) tablet 10 mg  10 mg Oral QPM    pantoprazole (PROTONIX) 40 mg in 0.9% sodium chloride 10 mL injection  40 mg IntraVENous DAILY    alcohol 62% (NOZIN) nasal  1 Ampule  1 Ampule Topical Q12H      Review of Systems  A comprehensive review of systems was negative except for that written in the HPI. Objective:     Visit Vitals  BP (!) 151/94 (BP 1 Location: Left arm)   Pulse 79   Temp 98 °F (36.7 °C)   Resp 16   Ht 5' 4\" (1.626 m)   Wt 77.1 kg (170 lb)   SpO2 96%   BMI 29.18 kg/m²      O2 Device: Room air    Temp (24hrs), Av.1 °F (36.7 °C), Min:97.9 °F (36.6 °C), Max:98.6 °F (37 °C)     1901 - 08/15 0700  In: -   Out: 100 [Urine:100]    General appearance: Tearful, oriented, emotional, alert, cooperative. Extremely anxious, shaky. Obese. Nauseated. Head: Normocephalic, without obvious abnormality, atraumatic  Eyes: conjunctivae/corneas clear. PERRL   Throat: Lips, mucosa, and tongue normal. Teeth and gums normal  Neck: supple, symmetrical, trachea midline, and no JVD  Lungs: clear to auscultation bilaterally  Heart: regular rate and rhythm, S1, S2 normal, no murmur, click, rub or gallop  Abdomen: soft, mild tenderness. Bowel sounds normal. No masses,  no organomegaly  Extremities: All extremities at baseline. Atraumatic, no cyanosis or edema  Skin: Skin color, texture, turgor dull and thickened with multiple areas of ecchymosis.   No rashes or lesions  Neurologic: Grossly normal    Additional comments: Notes,orders, test results, vitals reviewed    Data Review  Recent Results (from the past 24 hour(s))   CBC WITH AUTOMATED DIFF    Collection Time: 08/15/20  3:37 AM   Result Value Ref Range    WBC 9.6 4.3 - 11.1 K/uL    RBC 3.38 (L) 4.05 - 5.2 M/uL    HGB 10.4 (L) 11.7 - 15.4 g/dL    HCT 32.6 (L) 35.8 - 46.3 %    MCV 96.4 79.6 - 97.8 FL    MCH 30.8 26.1 - 32.9 PG    MCHC 31.9 31.4 - 35.0 g/dL    RDW 13.7 11.9 - 14.6 %    PLATELET 723 689 - 100 K/uL    MPV 8.9 (L) 9.4 - 12.3 FL    ABSOLUTE NRBC 0.00 0.0 - 0.2 K/uL    DF AUTOMATED      NEUTROPHILS 80 (H) 43 - 78 %    LYMPHOCYTES 15 13 - 44 %    MONOCYTES 4 4.0 - 12.0 %    EOSINOPHILS 0 (L) 0.5 - 7.8 %    BASOPHILS 0 0.0 - 2.0 %    IMMATURE GRANULOCYTES 1 0.0 - 5.0 %    ABS. NEUTROPHILS 7.7 1.7 - 8.2 K/UL    ABS. LYMPHOCYTES 1.4 0.5 - 4.6 K/UL    ABS. MONOCYTES 0.4 0.1 - 1.3 K/UL    ABS. EOSINOPHILS 0.0 0.0 - 0.8 K/UL    ABS. BASOPHILS 0.0 0.0 - 0.2 K/UL    ABS. IMM. GRANS. 0.1 0.0 - 0.5 K/UL   METABOLIC PANEL, BASIC    Collection Time: 08/15/20  3:37 AM   Result Value Ref Range    Sodium 143 136 - 145 mmol/L    Potassium 3.2 (L) 3.5 - 5.1 mmol/L    Chloride 107 98 - 107 mmol/L    CO2 29 21 - 32 mmol/L    Anion gap 7 7 - 16 mmol/L    Glucose 97 65 - 100 mg/dL    BUN 12 6 - 23 MG/DL    Creatinine 0.84 0.6 - 1.0 MG/DL    GFR est AA >60 >60 ml/min/1.73m2    GFR est non-AA >60 >60 ml/min/1.73m2    Calcium 7.8 (L) 8.3 - 10.4 MG/DL        Assessment/Plan:   Exacerbation of chronic pancreatitis with acute on chronic abdominal pain, nausea and vomiting. Sphincter of Oddi dysfunction with history of sphincterotomy. 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 Takes 10 mg in am and   5 mg at 1500 daily. Receiving 10 in am, 2.5 mg at 1500 her.     To increase to home dose only per MD.     Chronic illness     Chronic pain syndrome on high dose daily narcotics   Takes 16-24 mg po dilaudid and 60 mg oxycontin daily at  home:  Dosing verified via med bottles per RN. Receiving 1 mg dilaudid SQ q 4 hours prn here per MD   Direction: Unrelieved pain. Concern for narcotic withdrawls, unable to take anxiolytics   Discharge as soon as possible to follow up with primary   [facility]    Chronic abdominal pain:  As above    Addisons disease on steroids x 17 yrs:  See above     Functional Asburgers per history     History of multiple skin abscesses with MRSA: immunocompromised   Does not want to take SQ meds    Multiple allergies     Anxiety:  Unable to take ativan due to side effects    Upgrade to inpatient per Physician Advisory   Home meds reconciled, reinstate ordered when able to retain.      Care Plan discussed with: Patient, MD and and Nurse    Signed By: Saray Kaplan NP     August 15, 2020

## 2020-08-15 NOTE — PHYSICIAN ADVISORY
UPGRADE LETTER  
 
upgraded from OBSERVATION  to INPATIENT  Status Letter of Status Determination:  
Recommend hospitalization status upgraded from OBSERVATION  to INPATIENT Status Pt Name:  Leandro Lim MR#  
DELONTE # [id number] / 
[id number] Payor: Dominic / Plan: [id number] / Product Type: Managed Care Medicaid /   
CSN#  [id number] [facility]  356[facility]01  @ [address] [place] [address] Hospitalization date  8/14/2020  2:38 PM  
Current Attending Physician  Aylin Thapa MD  
Principal diagnosis  Abdominal pain [R10.9] Clinicals   36 y/o female with a PMH significant for chronic Pancreatitis, GERD and Asthma admitted for worsening abdominal pain necessitating ongoing close monitoring and management including with bowel rest, IVFs,, follow up kidney and liver function tests, IV analgesics and IV anti-emetics. [place] G) criteria Does  apply STATUS DETERMINATION  Based on documented presenting clinical data, comorbid conditions, high risk of adverse events and deterioration, it is our recommendation that the patient's status be upgraded from OBSERVATION to INPATIENT status. The final decision of the patient's hospitalization status depends on the Attending Physician's judgement Additional comments Payor: Dominic / Plan: [id number] / Product Type: Managed Care Medicaid / The information in this document is a recommendation to be used for utilization review and utilization management purposes only. This recommendation is not an order. The recommendation is made based on the information reviewed at the time of the referral, is pursuant to the [facility] Conditions of Participation (42 CFR Part 482), and is neither a judgment nor an assessment with regard to the appropriateness or quality of clinical care. For all Managed Care patients:  The Criteria are intended solely for use as screening guidelines with respect to the medical appropriateness of healthcare services and not for final clinical or payment determinations concerning the type or level of medical care provided, or proposed to be provided, to a patient. They help the reviewers determine whether a patient is appropriate for observation or inpatient admission at the time a decision to admit the patient is being made. All efforts are made to apply the pertinent payor criteria ([employer] or [employer]) as well as the clinical judgements based on the information reviewed at the time of the referral.\" Nothing in this document may be used to limit, alter, or affect clinical services provided to the patient named. Panfilo Dozier MD FACP Physician Advisor [facility] [phone] [email or web address] 
 
3:28 PM 8/15/2020

## 2020-08-15 NOTE — PROGRESS NOTES
08/14/20 2033 Dual Skin Pressure Injury Assessment Dual Skin Pressure Injury Assessment WDL Second Care Provider (Based on [address] [place] [address]) Chu Brito RN

## 2020-08-15 NOTE — PROGRESS NOTES
08/14/20 2033   Dual Skin Pressure Injury Assessment   Dual Skin Pressure Injury Assessment WDL   Second Care Provider (Based on [address] [place] [address]) Stanford Hamlin RN

## 2020-08-15 NOTE — PROGRESS NOTES
Verbal report received from ER nurse, Gail Honeycutt on [facility].     Joey Kearns RN

## 2020-08-15 NOTE — PROGRESS NOTES
End of Shift Note:    - Pt given zofran x2 and dilaudid x2  - Pt slept off and on.  - Ambulated independently several times throughout the night. Report given to oncoming RN.     Cheyenne Habermann, RN

## 2020-08-15 NOTE — PROGRESS NOTES
08/14/20 2033   Dual Skin Pressure Injury Assessment   Dual Skin Pressure Injury Assessment WDL   Second Care Provider (Based on Facility Policy) Anyi Tai, RN   Skin Integumentary   Skin Integumentary (WDL) X    Pressure  Injury Documentation No Pressure Injury Noted-Pressure Ulcer Prevention Initiated   Skin Color Ecchymosis (comment); Other (comment)  (pt stated has eczema and bruises easily/ scattered)   Skin Condition/Temp Warm   Skin Integrity Intact; Tattoos (comment); Scars (comment)

## 2020-08-15 NOTE — PROGRESS NOTES
Comprehensive Nutrition Assessment    Type and Reason for Visit: Initial, Positive nutrition screen    Nutrition Assessment:  Pt presented with abdominal pain, nausea and vomiting and unable to keep any food down. Determined to be pancreatitis. Pt has history of addisons disease. Spoke with patient and mother in room. She reports that about 2 days ago unable to eat and having significant nausea and vomiting. She reports this has happened before due to history. She states no weight loss and reduced nausea and vomiting since NPO. Discussed the likely diet advancement and also reviewed her allergies. Discussed with her that she is able to call to the kitchen and speak with management to determine food options. Allergies: See chart for full list: Gluten, soy, shellfish, peanuts, brazil nut, beef, chicken derived, strawberries, apples. Estimated Daily Nutrient Needs:  Energy (kcal):  8096-0135 kcals/day (20-25 kcal/kg)  Protein (g):  77-96 g/day (20% kcals)       Fluid (ml/day):  6121-2249 ml (1ml/kcal)    Nutrition Related Findings:  None      Wounds:    None       Current Nutrition Therapies:   DIET NPO With Ice Chips, With Sips of Clear Fluids, Except Meds    Anthropometric Measures:  · Height:  5' 4\" (162.6 cm)  · Current Body Wt:  77.1 kg (169 lb 15.6 oz)(has gained wt per EMR)    · Ideal Body Wt:  120 lbs:  141.6 %   · BMI Category: Overweight (BMI 25.0-29. 9)       Nutrition Diagnosis:   · Inadequate oral intake related to acute injury/trauma as evidenced by NPO or clear liquid status due to medical condition, nausea, vomiting    Nutrition Interventions:   Food and/or Nutrient Delivery: Continue NPO(advance diet as medically able)  Nutrition Education and Counseling: No recommendations at this time  Coordination of Nutrition Care: (Discussed with  department for once diet advanced)    Goals:  Pt to have diet advanced past full liquids within 3 days       Nutrition Monitoring and Evaluation: Food/Nutrient Intake Outcomes: Diet advancement/tolerance  Physical Signs/Symptoms Outcomes: Nausea/vomiting    Discharge Planning:     Too soon to determine     Electronically signed by Estuardo Ramirez MS, RD, LD on 8/15/2020 at 11:41 AM.  Contact: [phone]

## 2020-08-15 NOTE — PROGRESS NOTES
Pt's blood pressure elevated. Informed hospitalist, Dr. Wade Malone, and he said to continue to monitor.      Wesley Espinosa RN

## 2020-08-16 VITALS
HEIGHT: 64 IN | HEART RATE: 95 BPM | WEIGHT: 170 LBS | SYSTOLIC BLOOD PRESSURE: 140 MMHG | BODY MASS INDEX: 29.02 KG/M2 | TEMPERATURE: 97.8 F | RESPIRATION RATE: 16 BRPM | DIASTOLIC BLOOD PRESSURE: 90 MMHG | OXYGEN SATURATION: 95 %

## 2020-08-16 LAB
ALBUMIN SERPL-MCNC: 2.7 G/DL (ref 3.5–5)
ALBUMIN/GLOB SERPL: 1 {RATIO} (ref 1.2–3.5)
ALP SERPL-CCNC: 34 U/L (ref 50–136)
ALT SERPL-CCNC: 29 U/L (ref 12–65)
ANION GAP SERPL CALC-SCNC: 8 MMOL/L (ref 7–16)
AST SERPL-CCNC: 22 U/L (ref 15–37)
BASOPHILS # BLD: 0 K/UL (ref 0–0.2)
BASOPHILS NFR BLD: 0 % (ref 0–2)
BILIRUB SERPL-MCNC: 0.3 MG/DL (ref 0.2–1.1)
BUN SERPL-MCNC: 10 MG/DL (ref 6–23)
CALCIUM SERPL-MCNC: 7.2 MG/DL (ref 8.3–10.4)
CHLORIDE SERPL-SCNC: 114 MMOL/L (ref 98–107)
CO2 SERPL-SCNC: 23 MMOL/L (ref 21–32)
CREAT SERPL-MCNC: 0.71 MG/DL (ref 0.6–1)
DIFFERENTIAL METHOD BLD: ABNORMAL
EOSINOPHIL # BLD: 0 K/UL (ref 0–0.8)
EOSINOPHIL NFR BLD: 0 % (ref 0.5–7.8)
ERYTHROCYTE [DISTWIDTH] IN BLOOD BY AUTOMATED COUNT: 13.8 % (ref 11.9–14.6)
GLOBULIN SER CALC-MCNC: 2.8 G/DL (ref 2.3–3.5)
GLUCOSE SERPL-MCNC: 94 MG/DL (ref 65–100)
HCT VFR BLD AUTO: 33 % (ref 35.8–46.3)
HGB BLD-MCNC: 10.5 G/DL (ref 11.7–15.4)
IMM GRANULOCYTES # BLD AUTO: 0.1 K/UL (ref 0–0.5)
IMM GRANULOCYTES NFR BLD AUTO: 1 % (ref 0–5)
LYMPHOCYTES # BLD: 1.3 K/UL (ref 0.5–4.6)
LYMPHOCYTES NFR BLD: 16 % (ref 13–44)
MAGNESIUM SERPL-MCNC: 1.8 MG/DL (ref 1.8–2.4)
MCH RBC QN AUTO: 30.8 PG (ref 26.1–32.9)
MCHC RBC AUTO-ENTMCNC: 31.8 G/DL (ref 31.4–35)
MCV RBC AUTO: 96.8 FL (ref 79.6–97.8)
MONOCYTES # BLD: 0.4 K/UL (ref 0.1–1.3)
MONOCYTES NFR BLD: 5 % (ref 4–12)
NEUTS SEG # BLD: 6.2 K/UL (ref 1.7–8.2)
NEUTS SEG NFR BLD: 78 % (ref 43–78)
NRBC # BLD: 0 K/UL (ref 0–0.2)
PLATELET # BLD AUTO: 223 K/UL (ref 150–450)
PMV BLD AUTO: 8.9 FL (ref 9.4–12.3)
POTASSIUM SERPL-SCNC: 2.7 MMOL/L (ref 3.5–5.1)
PROT SERPL-MCNC: 5.5 G/DL (ref 6.3–8.2)
RBC # BLD AUTO: 3.41 M/UL (ref 4.05–5.2)
SODIUM SERPL-SCNC: 145 MMOL/L (ref 136–145)
WBC # BLD AUTO: 8 K/UL (ref 4.3–11.1)

## 2020-08-16 PROCEDURE — 74011250637 HC RX REV CODE- 250/637: Performed by: NURSE PRACTITIONER

## 2020-08-16 PROCEDURE — 36415 COLL VENOUS BLD VENIPUNCTURE: CPT

## 2020-08-16 PROCEDURE — 80053 COMPREHEN METABOLIC PANEL: CPT

## 2020-08-16 PROCEDURE — 85025 COMPLETE CBC W/AUTO DIFF WBC: CPT

## 2020-08-16 PROCEDURE — 74011250636 HC RX REV CODE- 250/636: Performed by: FAMILY MEDICINE

## 2020-08-16 PROCEDURE — 74011250637 HC RX REV CODE- 250/637: Performed by: FAMILY MEDICINE

## 2020-08-16 PROCEDURE — 74011250636 HC RX REV CODE- 250/636: Performed by: NURSE PRACTITIONER

## 2020-08-16 PROCEDURE — 74011250636 HC RX REV CODE- 250/636: Performed by: INTERNAL MEDICINE

## 2020-08-16 PROCEDURE — 74011250637 HC RX REV CODE- 250/637: Performed by: INTERNAL MEDICINE

## 2020-08-16 PROCEDURE — 83735 ASSAY OF MAGNESIUM: CPT

## 2020-08-16 RX ORDER — POTASSIUM CHLORIDE 14.9 MG/ML
20 INJECTION INTRAVENOUS
Status: DISCONTINUED | OUTPATIENT
Start: 2020-08-16 | End: 2020-08-16

## 2020-08-16 RX ORDER — OXYCODONE HCL 20 MG/1
40 TABLET, FILM COATED, EXTENDED RELEASE ORAL EVERY 12 HOURS
Status: DISCONTINUED | OUTPATIENT
Start: 2020-08-16 | End: 2020-08-16 | Stop reason: HOSPADM

## 2020-08-16 RX ORDER — ALBUTEROL SULFATE 0.83 MG/ML
2.5 SOLUTION RESPIRATORY (INHALATION)
Status: CANCELLED | OUTPATIENT
Start: 2020-08-16

## 2020-08-16 RX ORDER — POTASSIUM CHLORIDE 20 MEQ/1
40 TABLET, EXTENDED RELEASE ORAL
Status: COMPLETED | OUTPATIENT
Start: 2020-08-16 | End: 2020-08-16

## 2020-08-16 RX ORDER — HYDROMORPHONE HYDROCHLORIDE 4 MG/1
4 TABLET ORAL ONCE
Status: COMPLETED | OUTPATIENT
Start: 2020-08-16 | End: 2020-08-16

## 2020-08-16 RX ADMIN — ONDANSETRON 4 MG: 2 INJECTION INTRAMUSCULAR; INTRAVENOUS at 05:33

## 2020-08-16 RX ADMIN — POTASSIUM CHLORIDE 40 MEQ: 1500 TABLET, EXTENDED RELEASE ORAL at 12:23

## 2020-08-16 RX ADMIN — Medication 10 ML: at 05:03

## 2020-08-16 RX ADMIN — HYDROMORPHONE HYDROCHLORIDE 1 MG: 1 INJECTION, SOLUTION INTRAMUSCULAR; INTRAVENOUS; SUBCUTANEOUS at 03:32

## 2020-08-16 RX ADMIN — HYDROCORTISONE 10 MG: 5 TABLET ORAL at 08:38

## 2020-08-16 RX ADMIN — HYDROMORPHONE HYDROCHLORIDE 1 MG: 1 INJECTION, SOLUTION INTRAMUSCULAR; INTRAVENOUS; SUBCUTANEOUS at 00:31

## 2020-08-16 RX ADMIN — POTASSIUM CHLORIDE 20 MEQ: 14.9 INJECTION, SOLUTION INTRAVENOUS at 05:33

## 2020-08-16 RX ADMIN — ONDANSETRON 4 MG: 2 INJECTION INTRAMUSCULAR; INTRAVENOUS at 01:38

## 2020-08-16 RX ADMIN — HYDROMORPHONE HYDROCHLORIDE 1 MG: 1 INJECTION, SOLUTION INTRAMUSCULAR; INTRAVENOUS; SUBCUTANEOUS at 06:32

## 2020-08-16 RX ADMIN — OXYCODONE HYDROCHLORIDE 40 MG: 20 TABLET, FILM COATED, EXTENDED RELEASE ORAL at 08:38

## 2020-08-16 RX ADMIN — Medication 1 AMPULE: at 08:32

## 2020-08-16 RX ADMIN — HYDROMORPHONE HYDROCHLORIDE 4 MG: 4 TABLET ORAL at 09:55

## 2020-08-16 RX ADMIN — MONTELUKAST 10 MG: 10 TABLET, FILM COATED ORAL at 08:38

## 2020-08-16 RX ADMIN — SODIUM CHLORIDE 100 ML/HR: 900 INJECTION, SOLUTION INTRAVENOUS at 03:32

## 2020-08-16 RX ADMIN — POTASSIUM CHLORIDE 40 MEQ: 1500 TABLET, EXTENDED RELEASE ORAL at 08:38

## 2020-08-16 RX ADMIN — POTASSIUM CHLORIDE 20 MEQ: 14.9 INJECTION, SOLUTION INTRAVENOUS at 09:46

## 2020-08-16 NOTE — PROGRESS NOTES
Pt has been pleasant but frustrated regarding med's. She states she cannot take injections in the skin to H of large abscesses   . Pt skin is very fragile and easily bruised  Pt tearful regarding the thought of getting injections.       Support given     With pt multiple allergies she has nothing to take for nervousness

## 2020-08-16 NOTE — PROGRESS NOTES
END OF SHIFT NOTE:    Intake/Output  08/15 1901 - 08/16 0700  In: 3473 [P.O.:60; I.V.:2730]  Out: 550 [Urine:550]   Voiding: YES  Catheter: NO  Drain:              Stool:  0 occurrences. Stool Assessment  Stool Color: (pt did not save stool for assessment) (08/14/20 2332)  Stool Appearance: Watery (08/14/20 2033)  Stool Amount: Medium (08/14/20 1448)  Stool Source/Status: Rectum (08/14/20 1448)    Emesis:  0 occurrences. VITAL SIGNS  Patient Vitals for the past 12 hrs:   Temp Pulse Resp BP SpO2   08/16/20 0307 98 °F (36.7 °C) 80 16 (!) 142/93 92 %   08/15/20 2235 98.2 °F (36.8 °C) 82 18 (!) 157/99 96 %   08/15/20 1919 98.2 °F (36.8 °C) 83 16 (!) 144/91 94 %       Pain Assessment  Pain 1  Pain Scale 1: Numeric (0 - 10) (08/16/20 0332)  Pain Intensity 1: 10 (08/16/20 0332)  Patient Stated Pain Goal: 3 (08/16/20 0332)  Pain Reassessment 1: Yes (08/16/20 0105)  Pain Onset 1: pta (08/16/20 0332)  Pain Location 1: Abdomen (08/16/20 0332)  Pain Orientation 1: Upper (08/16/20 0332)  Pain Description 1: Constant; Stabbing (08/16/20 0332)  Pain Intervention(s) 1: Medication (see MAR); Rest (08/16/20 0110)    Ambulating  Yes    Additional Information:    Been crying & upset as her pain med was not switched to IV from SQ. Did take pain med SQ eventually. States nauseous;took ice chips/sips of liquids;no vomiting noted though   reported that she did early part of the shift. Shift report given to oncoming nurse Lety Bosch at the bedside.     Gold Christian RN

## 2020-08-16 NOTE — DISCHARGE SUMMARY
Hospitalist Discharge Summary     Admit Date:  2020  2:38 PM   Name:  Ren Estrada   Age:  35 y.o.  :  1980   MRN:  [id number]   PCP:  Chivo Jasso MD  Treatment Team: Attending Provider: Dayna Maki MD; Utilization Review: Suze Lemus; Primary Nurse: Milton Steiner RN    Problem List for this Hospitalization:  Hospital Problems as of 2020 Date Reviewed: 2016          Codes Class Noted - Resolved POA    Acute pancreatitis ICD-10-CM: K85.90  ICD-9-CM: 577.0  8/15/2020 - Present Unknown        * (Principal) Pancreatitis ICD-10-CM: K85.90  ICD-9-CM: 142.5  2020 - Present Unknown        Abdominal pain ICD-10-CM: R10.9  ICD-9-CM: 789.00  2020 - Present Unknown        Chronic abdominal pain (Chronic) ICD-10-CM: R10.9, G89.29  ICD-9-CM: 789.00, 338.29  2020 - Present Yes        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 Hospital Course:  Patient alert and oriented x3. Cr normal 0.71. afebrile, no leukocytosis. No witnessed vomiting and diarrhea. Urine benign for infection. Lipase 34. Saturations mid to high 90s. Potassium 2.7 this am, IV and po given prior to discharge and she understands to follow up with her PCP early this week for recheck. Mag level normal.    Follow up instructions and discharge meds at bottom of this note. Plan was discussed with patient, nurse. All questions answered. Patient was stable at time of discharge. 10 systems reviewed and negative except as noted in HPI. Diagnostic Imaging/Tests:   No results found.     Echocardiogram results:  No results found for this visit on 08/14/20.       All Micro Results     None          Labs: Results:       BMP, Mg, Phos Recent Labs     08/16/20  0346 08/15/20  0337 08/14/20  1558    143 141   K 2.7* 3.2* 3.5   * 107 102   CO2 23 29 31   AGAP 8 7 8   BUN 10 12 11   CREA 0.71 0.84 0.96   CA 7.2* 7.8* 9.0   GLU 94 97 108*   MG 1.8  --   --       CBC Recent Labs     08/16/20  0346 08/15/20  0337 08/14/20  1558   WBC 8.0 9.6 12.1*   RBC 3.41* 3.38* 4.12   HGB 10.5* 10.4* 12.6   HCT 33.0* 32.6* 39.7    222 278   GRANS 78 80* 86*   LYMPH 16 15 10*   EOS 0* 0* 0*   MONOS 5 4 3*   BASOS 0 0 0   IG 1 1 1   ANEU 6.2 7.7 10.5*   ABL 1.3 1.4 1.2   JACQUE 0.0 0.0 0.0   ABM 0.4 0.4 0.4   ABB 0.0 0.0 0.0   AIG 0.1 0.1 0.1      LFT Recent Labs     08/16/20 0346 08/14/20  1558   ALT 29 31   AP 34* 43*   TP 5.5* 6.9   ALB 2.7* 3.3*   GLOB 2.8 3.6*   AGRAT 1.0* 0.9*      Cardiac Testing No results found for: BNPP, BNP, CPK, RCK1, RCK2, RCK3, RCK4, CKMB, CKNDX, CKND1, TROPT, TROIQ   Coagulation Tests Lab Results   Component Value Date/Time    Prothrombin time 10.2 10/14/2015 07:03 PM    Prothrombin time 10.3 12/05/2011 09:45 AM    INR 1.0 10/14/2015 07:03 PM    INR 1.0 12/05/2011 09:45 AM    aPTT 21.0 (L) 10/14/2015 07:03 PM    aPTT 24.3 (L) 12/05/2011 09:45 AM      A1c Lab Results   Component Value Date/Time    Hemoglobin A1c 5.9 01/16/2015 09:21 AM    Hemoglobin A1c 5.4 04/26/2012 09:30 AM      Lipid Panel No results found for: CHOL, CHOLPOCT, CHOLX, CHLST, CHOLV, 397817, HDL, HDLP, LDL, LDLC, DLDLP, 325839, VLDLC, VLDL, TGLX, TRIGL, TRIGP, TGLPOCT, CHHD, CHHDX   Thyroid Panel Lab Results   Component Value Date/Time    TSH 0.226 (L) 01/16/2015 09:19 AM    TSH 0.860 12/23/2013 12:31 PM    T4, Free 0.8 (L) 01/16/2015 09:19 AM        Most Recent UA Lab Results   Component Value Date/Time    Color YELLOW 12/05/2011 09:45 AM    Appearance CLEAR 12/05/2011 09:45 AM    pH (UA) 6.0 12/05/2011 09:45 AM    Protein NEGATIVE  12/05/2011 09:45 AM    Glucose NEGATIVE  12/05/2011 09:45 AM    Ketone NEGATIVE  12/05/2011 09:45 AM    Bilirubin NEGATIVE  12/05/2011 09:45 AM    Blood NEGATIVE  12/05/2011 09:45 AM    Urobilinogen 1.0 12/05/2011 09:45 AM    Nitrites NEGATIVE  12/05/2011 09:45 AM    Leukocyte Esterase TRACE (A) 12/05/2011 09:45 AM        Allergies   Allergen Reactions    Latex Anaphylaxis    [place] Oil Anaphylaxis    [place] Nut Anaphylaxis    Peanut Anaphylaxis    Shellfish Containing Products Anaphylaxis    Beef Containing Products Other (comments)     Per allergy testing    Chicken Derived Other (comments)     Per allergy testing    Chlorpromazine Other (comments)     Dystonic      Clindamycin Rash    Dopamine Receptor Agonist Other (comments)     Dystonic      Gluten Other (comments)     Per allergy testing    Hydroxyzine Pamoate Other (comments)     DYSTONIC      Ibuprofen Rash and Photosensitivity    Iodine Rash    Naproxen Sodium Rash    Nubain [Nalbuphine] Rash    Other Medication Other (comments)     Bananas, avacodo, strawberry, potatoes  --- ALL PER ALLERGY TESTING    Pcn [Penicillins] Rash    Povidone-Iodine Rash    Prochlorperazine Edisylate Other (comments)     Dystonic      Promethazine Other (comments)     Dystonic      Reglan [Metoclopramide] Anaphylaxis     dystonia    Soy Other (comments)     Per allergy testing    Sulfa (Sulfonamide Antibiotics) Rash    Toradol [Ketorolac Tromethamine] Rash    Tramadol Rash    Zithromax [Azithromycin] Rash     Immunization History   Administered Date(s) Administered    TD Vaccine 05/07/2009       All Labs from Last 24 Hrs:  Recent Results (from the past 24 hour(s))   METABOLIC PANEL, COMPREHENSIVE    Collection Time: 08/16/20  3:46 AM   Result Value Ref Range    Sodium 145 136 - 145 mmol/L    Potassium 2.7 (LL) 3.5 - 5.1 mmol/L    Chloride 114 (H) 98 - 107 mmol/L    CO2 23 21 - 32 mmol/L    Anion gap 8 7 - 16 mmol/L    Glucose 94 65 - 100 mg/dL    BUN 10 6 - 23 MG/DL    Creatinine 0.71 0.6 - 1.0 MG/DL    GFR est AA >60 >60 ml/min/1.73m2    GFR est non-AA >60 >60 ml/min/1.73m2    Calcium 7.2 (L) 8.3 - 10.4 MG/DL    Bilirubin, total 0.3 0.2 - 1.1 MG/DL    ALT (SGPT) 29 12 - 65 U/L    AST (SGOT) 22 15 - 37 U/L    Alk. phosphatase 34 (L) 50 - 136 U/L    Protein, total 5.5 (L) 6.3 - 8.2 g/dL    Albumin 2.7 (L) 3.5 - 5.0 g/dL    Globulin 2.8 2.3 - 3.5 g/dL    A-G Ratio 1.0 (L) 1.2 - 3.5     CBC WITH AUTOMATED DIFF    Collection Time: 08/16/20  3:46 AM   Result Value Ref Range    WBC 8.0 4.3 - 11.1 K/uL    RBC 3.41 (L) 4.05 - 5.2 M/uL    HGB 10.5 (L) 11.7 - 15.4 g/dL    HCT 33.0 (L) 35.8 - 46.3 %    MCV 96.8 79.6 - 97.8 FL    MCH 30.8 26.1 - 32.9 PG    MCHC 31.8 31.4 - 35.0 g/dL    RDW 13.8 11.9 - 14.6 %    PLATELET 137 295 - 684 K/uL    MPV 8.9 (L) 9.4 - 12.3 FL    ABSOLUTE NRBC 0.00 0.0 - 0.2 K/uL    DF AUTOMATED      NEUTROPHILS 78 43 - 78 %    LYMPHOCYTES 16 13 - 44 %    MONOCYTES 5 4.0 - 12.0 %    EOSINOPHILS 0 (L) 0.5 - 7.8 %    BASOPHILS 0 0.0 - 2.0 %    IMMATURE GRANULOCYTES 1 0.0 - 5.0 %    ABS. NEUTROPHILS 6.2 1.7 - 8.2 K/UL    ABS. LYMPHOCYTES 1.3 0.5 - 4.6 K/UL    ABS. MONOCYTES 0.4 0.1 - 1.3 K/UL    ABS. EOSINOPHILS 0.0 0.0 - 0.8 K/UL    ABS. BASOPHILS 0.0 0.0 - 0.2 K/UL    ABS. IMM.  GRANS. 0.1 0.0 - 0.5 K/UL   MAGNESIUM    Collection Time: 08/16/20  3:46 AM   Result Value Ref Range    Magnesium 1.8 1.8 - 2.4 mg/dL       Discharge Exam:  Patient Vitals for the past 24 hrs:   Temp Pulse Resp BP SpO2   08/16/20 0721 98.4 °F (36.9 °C) 71 16 141/88 92 %   08/16/20 0307 98 °F (36.7 °C) 80 16 (!) 142/93 92 %   08/15/20 2235 98.2 °F (36.8 °C) 82 18 (!) 157/99 96 %   08/15/20 1919 98.2 °F (36.8 °C) 83 16 (!) 144/91 94 %   08/15/20 1445 98 °F (36.7 °C) 79 16 (!) 151/94 96 %   08/15/20 1106 98 °F (36.7 °C) 80 16 116/72 94 %     Oxygen Therapy  O2 Sat (%): 92 % (08/16/20 0721)  Pulse via Oximetry: 88 beats per minute (08/14/20 1842)  O2 Device: Room air (08/16/20 7111)    Intake/Output Summary (Last 24 hours) at 8/16/2020 0816  Last data filed at 8/16/2020 0538  Gross per 24 hour   Intake 3055 ml   Output 750 ml   Net 2305 ml         Physical exam:  General:    Well nourished. Alert. No distress. Eyes:   Normal sclera. Extraocular movements intact. ENT:  Normocephalic, atraumatic. Moist mucous membranes  CV:   Regular rate and rhythm. No murmur, rub, or gallop. Lungs:  Clear to auscultation bilaterally. No wheezing, rhonchi, or rales. Abdomen: Soft, nontender, nondistended. Bowel sounds normal.   Extremities: Warm and dry. No cyanosis or edema. Neurologic: No focal deficits  Skin:     No rashes or jaundice. Psych:  Normal mood and affect. Discharge Info:   Current Discharge Medication List      CONTINUE these medications which have NOT CHANGED    Details   !! hydrocortisone (CORTEF) 10 mg tablet Take 10 mg by mouth every morning. !! hydrocortisone (CORTEF) 5 mg tablet Take 5 mg by mouth nightly. ESTRADIOL PO Take  by mouth daily. B.infantis-B.ani-B.long-B.bifi (PROBIOTIC 4X) 10-15 mg TbEC Take 1 Tab by mouth every morning. furosemide (LASIX) 40 mg tablet Take 40 mg by mouth daily. Potassium Citrate 15 mEq TbER Take 15 mEq by mouth two (2) times a day. oxyCODONE CR (OXYCONTIN) 40 mg CR tablet Take 40 mg by mouth every twelve (12) hours. Indications: CHRONIC PAIN      omeprazole (PRILOSEC) 20 mg capsule Take 20 mg by mouth two (2) times a day. HYDROmorphone (DILAUDID) 4 mg tablet Take  by mouth every three (3) hours as needed for Pain.      montelukast (SINGULAIR) 10 mg tablet Take 10 mg by mouth every morning. albuterol (PROVENTIL, VENTOLIN) 90 mcg/Actuation inhaler Take 2 Puffs by inhalation as needed. Epinephrine 0.15 mg/0.15 mL PnIj by IntraMUSCular route as needed. Epi-pen as needed for allergic reaction      ZOFRAN 8 mg Tab Take 8 mg by mouth as needed.       lipase-protease-amylase (Mardell Plater) capsule Take 1 Cap by mouth Before breakfast, lunch, dinner and at bedtime. calcium citrate 200 mg (950 mg) tablet Take 200 mg by mouth three (3) times daily. !! - Potential duplicate medications found. Please discuss with provider. Disposition: home    Activity: Activity as tolerated  Diet: DIET CLEAR LIQUID    Follow-up Appointments   Procedures    FOLLOW UP VISIT Appointment in: 3 - 5 Days PCP for BMP recheck     PCP for BMP recheck     Standing Status:   Standing     Number of Occurrences:   1     Order Specific Question:   Appointment in     Answer:   3 - 5 Days         Follow-up Information     Follow up With Specialties Details Why Sagar Ross MD Internal Medicine Schedule an appointment as soon as possible for a visit post-hospital follow up, BMP recheck [phone] [facility]  [phone]                Time spent in patient discharge planning and coordination 35 minutes.     Signed:  Darlene Ramsey NP

## 2020-08-17 ENCOUNTER — PATIENT OUTREACH (OUTPATIENT)
Dept: CASE MANAGEMENT | Age: 40
End: 2020-08-17

## 2020-08-17 NOTE — PROGRESS NOTES
Date/Time:  8/17/2020 12:43 PM  Attempted to reach patient by telephone. Left HIPPA compliant message requesting a return call. Will attempt to reach patient again.

## 2020-08-18 ENCOUNTER — PATIENT OUTREACH (OUTPATIENT)
Dept: CASE MANAGEMENT | Age: 40
End: 2020-08-18

## 2020-08-18 NOTE — PROGRESS NOTES
Date/Time:  8/18/2020 9:28 AM  Attempted to reach patient by telephone. Left HIPPA compliant message requesting a return call. CCM will close case d/t unable to reach x2.

## 2021-01-12 ENCOUNTER — APPOINTMENT (OUTPATIENT)
Dept: GENERAL RADIOLOGY | Age: 41
End: 2021-01-12
Attending: EMERGENCY MEDICINE
Payer: COMMERCIAL

## 2021-01-12 ENCOUNTER — HOSPITAL ENCOUNTER (OUTPATIENT)
Age: 41
Setting detail: OBSERVATION
Discharge: HOME | End: 2021-01-14
Attending: EMERGENCY MEDICINE | Admitting: FAMILY MEDICINE
Payer: COMMERCIAL

## 2021-01-12 ENCOUNTER — APPOINTMENT (OUTPATIENT)
Dept: CT IMAGING | Age: 41
End: 2021-01-12
Attending: EMERGENCY MEDICINE
Payer: COMMERCIAL

## 2021-01-12 DIAGNOSIS — R11.2 INTRACTABLE NAUSEA AND VOMITING: Primary | ICD-10-CM

## 2021-01-12 LAB
ALBUMIN SERPL-MCNC: 3.1 G/DL (ref 3.5–5)
ALBUMIN/GLOB SERPL: 0.7 {RATIO} (ref 1.2–3.5)
ALP SERPL-CCNC: 77 U/L (ref 50–136)
ALT SERPL-CCNC: 19 U/L (ref 12–65)
AMPHET UR QL SCN: NEGATIVE
ANION GAP SERPL CALC-SCNC: 10 MMOL/L (ref 7–16)
APPEARANCE UR: CLEAR
AST SERPL-CCNC: 30 U/L (ref 15–37)
BACTERIA URNS QL MICRO: 0 /HPF
BARBITURATES UR QL SCN: NEGATIVE
BASOPHILS # BLD: 0.1 K/UL (ref 0–0.2)
BASOPHILS NFR BLD: 0 % (ref 0–2)
BENZODIAZ UR QL: NEGATIVE
BILIRUB SERPL-MCNC: 0.3 MG/DL (ref 0.2–1.1)
BILIRUB UR QL: NEGATIVE
BUN SERPL-MCNC: 10 MG/DL (ref 6–23)
CALCIUM SERPL-MCNC: 9.6 MG/DL (ref 8.3–10.4)
CANNABINOIDS UR QL SCN: NEGATIVE
CASTS URNS QL MICRO: ABNORMAL /LPF
CHLORIDE SERPL-SCNC: 104 MMOL/L (ref 98–107)
CO2 SERPL-SCNC: 24 MMOL/L (ref 21–32)
COCAINE UR QL SCN: NEGATIVE
COLOR UR: YELLOW
CREAT SERPL-MCNC: 0.91 MG/DL (ref 0.6–1)
DIFFERENTIAL METHOD BLD: ABNORMAL
EOSINOPHIL # BLD: 0 K/UL (ref 0–0.8)
EOSINOPHIL NFR BLD: 0 % (ref 0.5–7.8)
EPI CELLS #/AREA URNS HPF: ABNORMAL /HPF
ERYTHROCYTE [DISTWIDTH] IN BLOOD BY AUTOMATED COUNT: 13.8 % (ref 11.9–14.6)
GLOBULIN SER CALC-MCNC: 4.5 G/DL (ref 2.3–3.5)
GLUCOSE SERPL-MCNC: 109 MG/DL (ref 65–100)
GLUCOSE UR STRIP.AUTO-MCNC: NEGATIVE MG/DL
HCT VFR BLD AUTO: 43.3 % (ref 35.8–46.3)
HGB BLD-MCNC: 13.2 G/DL (ref 11.7–15.4)
HGB UR QL STRIP: NEGATIVE
IMM GRANULOCYTES # BLD AUTO: 0.1 K/UL (ref 0–0.5)
IMM GRANULOCYTES NFR BLD AUTO: 0 % (ref 0–5)
KETONES UR QL STRIP.AUTO: NEGATIVE MG/DL
LACTATE SERPL-SCNC: 2 MMOL/L (ref 0.4–2)
LEUKOCYTE ESTERASE UR QL STRIP.AUTO: ABNORMAL
LIPASE SERPL-CCNC: 198 U/L (ref 73–393)
LYMPHOCYTES # BLD: 2.9 K/UL (ref 0.5–4.6)
LYMPHOCYTES NFR BLD: 16 % (ref 13–44)
MAGNESIUM SERPL-MCNC: 1.9 MG/DL (ref 1.8–2.4)
MCH RBC QN AUTO: 27.8 PG (ref 26.1–32.9)
MCHC RBC AUTO-ENTMCNC: 30.5 G/DL (ref 31.4–35)
MCV RBC AUTO: 91.2 FL (ref 79.6–97.8)
METHADONE UR QL: NEGATIVE
MONOCYTES # BLD: 0.9 K/UL (ref 0.1–1.3)
MONOCYTES NFR BLD: 5 % (ref 4–12)
NEUTS SEG # BLD: 14.9 K/UL (ref 1.7–8.2)
NEUTS SEG NFR BLD: 79 % (ref 43–78)
NITRITE UR QL STRIP.AUTO: NEGATIVE
NRBC # BLD: 0 K/UL (ref 0–0.2)
OPIATES UR QL: POSITIVE
PCP UR QL: NEGATIVE
PH UR STRIP: 7.5 [PH] (ref 5–9)
PLATELET # BLD AUTO: 432 K/UL (ref 150–450)
PMV BLD AUTO: 9.2 FL (ref 9.4–12.3)
POTASSIUM SERPL-SCNC: 5 MMOL/L (ref 3.5–5.1)
PROT SERPL-MCNC: 7.6 G/DL (ref 6.3–8.2)
PROT UR STRIP-MCNC: NEGATIVE MG/DL
RBC # BLD AUTO: 4.75 M/UL (ref 4.05–5.2)
RBC #/AREA URNS HPF: ABNORMAL /HPF
SODIUM SERPL-SCNC: 138 MMOL/L (ref 136–145)
SP GR UR REFRACTOMETRY: 1.02 (ref 1–1.02)
UROBILINOGEN UR QL STRIP.AUTO: 0.2 EU/DL (ref 0.2–1)
WBC # BLD AUTO: 18.9 K/UL (ref 4.3–11.1)
WBC URNS QL MICRO: ABNORMAL /HPF

## 2021-01-12 PROCEDURE — 96375 TX/PRO/DX INJ NEW DRUG ADDON: CPT

## 2021-01-12 PROCEDURE — 81003 URINALYSIS AUTO W/O SCOPE: CPT

## 2021-01-12 PROCEDURE — 83605 ASSAY OF LACTIC ACID: CPT

## 2021-01-12 PROCEDURE — 74176 CT ABD & PELVIS W/O CONTRAST: CPT

## 2021-01-12 PROCEDURE — 99218 HC RM OBSERVATION: CPT

## 2021-01-12 PROCEDURE — 83735 ASSAY OF MAGNESIUM: CPT

## 2021-01-12 PROCEDURE — 74022 RADEX COMPL AQT ABD SERIES: CPT

## 2021-01-12 PROCEDURE — 83690 ASSAY OF LIPASE: CPT

## 2021-01-12 PROCEDURE — 81001 URINALYSIS AUTO W/SCOPE: CPT

## 2021-01-12 PROCEDURE — 96376 TX/PRO/DX INJ SAME DRUG ADON: CPT

## 2021-01-12 PROCEDURE — 74011250636 HC RX REV CODE- 250/636: Performed by: FAMILY MEDICINE

## 2021-01-12 PROCEDURE — 85025 COMPLETE CBC W/AUTO DIFF WBC: CPT

## 2021-01-12 PROCEDURE — 80053 COMPREHEN METABOLIC PANEL: CPT

## 2021-01-12 PROCEDURE — 99285 EMERGENCY DEPT VISIT HI MDM: CPT

## 2021-01-12 PROCEDURE — 96374 THER/PROPH/DIAG INJ IV PUSH: CPT

## 2021-01-12 PROCEDURE — 80307 DRUG TEST PRSMV CHEM ANLYZR: CPT

## 2021-01-12 PROCEDURE — 74011250636 HC RX REV CODE- 250/636: Performed by: EMERGENCY MEDICINE

## 2021-01-12 RX ORDER — ONDANSETRON 2 MG/ML
4 INJECTION INTRAMUSCULAR; INTRAVENOUS ONCE
Status: COMPLETED | OUTPATIENT
Start: 2021-01-12 | End: 2021-01-12

## 2021-01-12 RX ORDER — PANTOPRAZOLE SODIUM 40 MG/1
40 TABLET, DELAYED RELEASE ORAL
Status: DISCONTINUED | OUTPATIENT
Start: 2021-01-13 | End: 2021-01-14 | Stop reason: HOSPADM

## 2021-01-12 RX ORDER — HYDROMORPHONE HYDROCHLORIDE 4 MG/1
4 TABLET ORAL
Status: DISCONTINUED | OUTPATIENT
Start: 2021-01-12 | End: 2021-01-14 | Stop reason: HOSPADM

## 2021-01-12 RX ORDER — SODIUM CHLORIDE 0.9 % (FLUSH) 0.9 %
5-40 SYRINGE (ML) INJECTION EVERY 8 HOURS
Status: DISCONTINUED | OUTPATIENT
Start: 2021-01-13 | End: 2021-01-14 | Stop reason: HOSPADM

## 2021-01-12 RX ORDER — MORPHINE SULFATE 2 MG/ML
2 INJECTION, SOLUTION INTRAMUSCULAR; INTRAVENOUS
Status: DISCONTINUED | OUTPATIENT
Start: 2021-01-12 | End: 2021-01-14 | Stop reason: HOSPADM

## 2021-01-12 RX ORDER — FUROSEMIDE 20 MG/1
40 TABLET ORAL DAILY
Status: DISCONTINUED | OUTPATIENT
Start: 2021-01-13 | End: 2021-01-14 | Stop reason: HOSPADM

## 2021-01-12 RX ORDER — ACETAMINOPHEN 650 MG/1
650 SUPPOSITORY RECTAL
Status: DISCONTINUED | OUTPATIENT
Start: 2021-01-12 | End: 2021-01-14 | Stop reason: HOSPADM

## 2021-01-12 RX ORDER — HALOPERIDOL 5 MG/ML
5 INJECTION INTRAMUSCULAR ONCE
Status: ACTIVE | OUTPATIENT
Start: 2021-01-12 | End: 2021-01-13

## 2021-01-12 RX ORDER — ONDANSETRON 2 MG/ML
4 INJECTION INTRAMUSCULAR; INTRAVENOUS
Status: DISCONTINUED | OUTPATIENT
Start: 2021-01-12 | End: 2021-01-14 | Stop reason: HOSPADM

## 2021-01-12 RX ORDER — MONTELUKAST SODIUM 10 MG/1
10 TABLET ORAL
Status: DISCONTINUED | OUTPATIENT
Start: 2021-01-13 | End: 2021-01-14 | Stop reason: HOSPADM

## 2021-01-12 RX ORDER — OXYCODONE HCL 10 MG/1
30 TABLET, FILM COATED, EXTENDED RELEASE ORAL EVERY 12 HOURS
Status: DISCONTINUED | OUTPATIENT
Start: 2021-01-13 | End: 2021-01-14 | Stop reason: HOSPADM

## 2021-01-12 RX ORDER — HYDROMORPHONE HYDROCHLORIDE 1 MG/ML
1 INJECTION, SOLUTION INTRAMUSCULAR; INTRAVENOUS; SUBCUTANEOUS ONCE
Status: COMPLETED | OUTPATIENT
Start: 2021-01-12 | End: 2021-01-12

## 2021-01-12 RX ORDER — ACETAMINOPHEN 325 MG/1
650 TABLET ORAL
Status: DISCONTINUED | OUTPATIENT
Start: 2021-01-12 | End: 2021-01-14 | Stop reason: HOSPADM

## 2021-01-12 RX ORDER — ALBUTEROL SULFATE 90 UG/1
2 AEROSOL, METERED RESPIRATORY (INHALATION)
Status: DISCONTINUED | OUTPATIENT
Start: 2021-01-12 | End: 2021-01-14 | Stop reason: HOSPADM

## 2021-01-12 RX ORDER — HYDROCORTISONE 5 MG/1
5 TABLET ORAL
Status: DISCONTINUED | OUTPATIENT
Start: 2021-01-13 | End: 2021-01-14 | Stop reason: HOSPADM

## 2021-01-12 RX ORDER — ONDANSETRON 4 MG/1
8 TABLET, ORALLY DISINTEGRATING ORAL
Status: DISCONTINUED | OUTPATIENT
Start: 2021-01-12 | End: 2021-01-14 | Stop reason: HOSPADM

## 2021-01-12 RX ORDER — HYDROCORTISONE 5 MG/1
10 TABLET ORAL
Status: DISCONTINUED | OUTPATIENT
Start: 2021-01-13 | End: 2021-01-14 | Stop reason: HOSPADM

## 2021-01-12 RX ORDER — SODIUM CHLORIDE 0.9 % (FLUSH) 0.9 %
5-40 SYRINGE (ML) INJECTION AS NEEDED
Status: DISCONTINUED | OUTPATIENT
Start: 2021-01-12 | End: 2021-01-14 | Stop reason: HOSPADM

## 2021-01-12 RX ORDER — ENOXAPARIN SODIUM 100 MG/ML
40 INJECTION SUBCUTANEOUS DAILY
Status: DISCONTINUED | OUTPATIENT
Start: 2021-01-13 | End: 2021-01-14 | Stop reason: HOSPADM

## 2021-01-12 RX ORDER — POLYETHYLENE GLYCOL 3350 17 G/17G
17 POWDER, FOR SOLUTION ORAL DAILY PRN
Status: DISCONTINUED | OUTPATIENT
Start: 2021-01-12 | End: 2021-01-14 | Stop reason: HOSPADM

## 2021-01-12 RX ORDER — SODIUM CHLORIDE 9 MG/ML
75 INJECTION, SOLUTION INTRAVENOUS CONTINUOUS
Status: DISCONTINUED | OUTPATIENT
Start: 2021-01-13 | End: 2021-01-14 | Stop reason: HOSPADM

## 2021-01-12 RX ADMIN — ONDANSETRON 4 MG: 2 INJECTION INTRAMUSCULAR; INTRAVENOUS at 17:35

## 2021-01-12 RX ADMIN — HYDROMORPHONE HYDROCHLORIDE 1 MG: 1 INJECTION, SOLUTION INTRAMUSCULAR; INTRAVENOUS; SUBCUTANEOUS at 17:35

## 2021-01-12 RX ADMIN — HYDROMORPHONE HYDROCHLORIDE 1 MG: 1 INJECTION, SOLUTION INTRAMUSCULAR; INTRAVENOUS; SUBCUTANEOUS at 16:23

## 2021-01-12 RX ADMIN — ONDANSETRON 4 MG: 2 INJECTION INTRAMUSCULAR; INTRAVENOUS at 21:13

## 2021-01-12 RX ADMIN — MORPHINE SULFATE 2 MG: 2 INJECTION, SOLUTION INTRAMUSCULAR; INTRAVENOUS at 21:13

## 2021-01-12 RX ADMIN — ONDANSETRON 4 MG: 2 INJECTION INTRAMUSCULAR; INTRAVENOUS at 16:23

## 2021-01-12 NOTE — ED PROVIDER NOTES
62-year-old female with chronic abdominal pain issues presenting for nausea vomiting and recurrent abdominal pain. She reports that symptoms started several days ago. She reports to me history of chronic pancreatitis for which she takes OxyContin and oral Dilaudid. Over the past couple days she's not been able to keep anything down and so her pain has become worse. She also reports an area on her left shin that she tells me has been \"excoriated and needs skin grafts but they can't do them because of Covid\". The history is provided by the patient. Abdominal Pain   This is a recurrent problem. The current episode started 2 days ago. The problem occurs constantly. The problem has not changed since onset. The pain is associated with vomiting. The pain is located in the generalized abdominal region. The quality of the pain is aching, cramping, pressure-like and shooting. The pain is at a severity of 6/10. The pain is moderate. Associated symptoms include diarrhea, nausea and vomiting. Pertinent negatives include no anorexia, no fever, no belching, no flatus, no hematochezia, no melena, no constipation, no dysuria, no frequency, no hematuria, no headaches, no arthralgias, no myalgias, no trauma, no chest pain and no back pain. Nothing worsens the pain. The pain is relieved by nothing. Past workup includes CT scan, ultrasound. Past workup includes no surgery, no esophagogastroduodenoscopy, no colonoscopy and no barium enema. Her past medical history is significant for pancreatitis. Her past medical history does not include PUD, gallstones, GERD, ulcerative colitis, Crohn's disease, irritable bowel syndrome, cancer, UTI, ectopic pregnancy, ovarian cysts, diverticulitis, atrial fibrillation, DM, kidney stones or small bowel obstruction. The patient's surgical history non-contributory.        Past Medical History:   Diagnosis Date    Arthritis     Asthma     uses MDI as needed - rarely-pt can't remember last time needed    Avascular necrosis of femur head     right hip, left hip replacements    Chronic pain     Endocrine disease diagnosed 2003    ADDISONS DISEASE    Endometriosis     hysterectomy    GERD (gastroesophageal reflux disease)     Infectious disease 2007    MRSA-2007, community acquired - in hospital [facility]) about 6 months - 3 months of which was in coma;  HPV-2009     Nausea & vomiting     Other ill-defined conditions(799.89)     allergies    Pancreatitis chronic     7 episodes    Unspecified adverse effect of anesthesia     pt reports \"difficult to sedate       Past Surgical History:   Procedure Laterality Date    HX APPENDECTOMY      HX CHOLECYSTECTOMY      HX GYN      hysterectomy    HX ORTHOPAEDIC  2010    right EDIN    HX ORTHOPAEDIC  2010    Left  EDIN    HX OTHER SURGICAL  2007    I&D of 9 abcesses -MRSA    HX SHOULDER REPLACEMENT  12/2011    Right    HX SHOULDER REPLACEMENT  2015    left    HX TONSILLECTOMY           Family History:   Problem Relation Age of Onset    Cancer Mother         THROID CANCER    Diabetes Maternal Grandmother     Stroke Maternal Grandfather     Heart Disease Paternal Grandmother        Social History     Socioeconomic History    Marital status: SINGLE     Spouse name: Not on file    Number of children: Not on file    Years of education: Not on file    Highest education level: Not on file   Occupational History    Not on file   Social Needs    Financial resource strain: Not on file    Food insecurity     Worry: Not on file     Inability: Not on file    Transportation needs     Medical: Not on file     Non-medical: Not on file   Tobacco Use    Smoking status: Never Smoker    Smokeless tobacco: Never Used   Substance and Sexual Activity    Alcohol use: No    Drug use: No    Sexual activity: Not Currently   Lifestyle    Physical activity     Days per week: Not on file     Minutes per session: Not on file    Stress: Not on file Relationships    Social connections     Talks on phone: Not on file     Gets together: Not on file     Attends Evangelical service: Not on file     Active member of club or organization: Not on file     Attends meetings of clubs or organizations: Not on file     Relationship status: Not on file    Intimate partner violence     Fear of current or ex partner: Not on file     Emotionally abused: Not on file     Physically abused: Not on file     Forced sexual activity: Not on file   Other Topics Concern    Not on file   Social History Narrative    Not on file         ALLERGIES: Latex, [place] oil, [place] nut, Peanut, Shellfish containing products, Beef containing products, Chicken derived, Chlorpromazine, Clindamycin, Dopamine receptor agonist, Gluten, Hydroxyzine pamoate, Ibuprofen, Iodine, Naproxen sodium, Nubain [nalbuphine], Other medication, Pcn [penicillins], Povidone-iodine, Prochlorperazine edisylate, Promethazine, Reglan [metoclopramide], Soy, Sulfa (sulfonamide antibiotics), Toradol [ketorolac tromethamine], Tramadol, and Zithromax [azithromycin]    Review of Systems   Constitutional: Negative for fever. Cardiovascular: Negative for chest pain. Gastrointestinal: Positive for abdominal pain, diarrhea, nausea and vomiting. Negative for anorexia, constipation, flatus, hematochezia and melena. Genitourinary: Negative for dysuria, frequency and hematuria. Musculoskeletal: Negative for arthralgias, back pain and myalgias. Neurological: Negative for headaches.        Vitals:    01/12/21 1439   BP: (!) 171/92   Pulse: (!) 110   Resp: 18   Temp: 98.9 °F (37.2 °C)   SpO2: 98%   Weight: 83.5 kg (184 lb)   Height: 5' 4\" (1.626 m)            Physical Exam     MDM  Number of Diagnoses or Management Options  Intractable nausea and vomiting  Diagnosis management comments: 65-year-old female with chronic abdominal pain presenting for worsening abdominal pain nausea vomiting and inability to keep down her medications. She also has a history of Sav[place]'s disease. We will give the patient some fluids, check basic labs give her some nausea medication pain medication see if she can keep down p.o. fluids. Patient has had multiple presentations to the emergency department for this over the past 6 months and my suspicion is she gets in the spells where she vomits, cannot keep down her pain medications, and comes in with worsening symptoms due to physiologic withdrawal.       Amount and/or Complexity of Data Reviewed  Clinical lab tests: ordered and reviewed  Tests in the radiology section of CPT®: ordered and reviewed  Tests in the medicine section of CPT®: ordered and reviewed  Decide to obtain previous medical records or to obtain history from someone other than the patient: yes  Discuss the patient with other providers: yes (Discussed case with the hospitalist who will assume care for symptom control)  Independent visualization of images, tracings, or specimens: yes    Risk of Complications, Morbidity, and/or Mortality  Presenting problems: high  Diagnostic procedures: high  Management options: high  General comments: I personally reviewed the patient's vital signs, laboratory tests, and/or radiological findings. I discussed these findings with the patient and their significance. I answered all questions and explained that given these findings there is significant concern for increased morbidity and/or mortality without immediate intervention. As a result, I recommended admission to the hospital, consulted the appropriate service, and transitioned care to that service in improved condition        ED Course as of Jan 12 1958   Tue Jan 12, 2021   1752 Given patient's complicated history we will do a CT without contrast that she has an iodine allergy given that I have no clear source of the patient's elevated white blood cell count.     [JS]   7202 Only significant lab abnormality is the elevated white blood cell count.  Urinalysis is clean. Chest x-ray is clear. [JS]   1948 Patient continues to complain of nausea vomiting. Work-up has been unremarkable. Multiple ER visits for same. [JS]   1948 CBC WITH AUTOMATED DIFF(!):    WBC 18.9(!)   RBC 4.75   HGB 13.2   HCT 43.3   MCV 91.2   MCH 27.8   MCHC 30.5(!)   RDW 13.8   PLATELET 007   MPV 9.2(!)   ABSOLUTE NRBC 0.00   DF AUTOMATED   NEUTROPHILS 79(!)   LYMPHOCYTES 16   MONOCYTES 5   EOSINOPHILS 0(!)   BASOPHILS 0   IMMATURE GRANULOCYTES 0   ABS. NEUTROPHILS 14.9(!)   ABS. LYMPHOCYTES 2.9   ABS. MONOCYTES 0.9   ABS. EOSINOPHILS 0.0   ABS. BASOPHILS 0.1   ABS. IMM. GRANS. 0.1 [JS]   1949 No clear source of patient's elevated white blood cell count. May just be a liquid response secondary to stress.     [JS]      ED Course User Index  [JS] Hamzah Bryant MD       Procedures

## 2021-01-12 NOTE — ED NOTES
Pt states she has had right upper bad pain with nausea vomiting, diarrhea, X 2 days. Hx of chronic pancreatitis. States she has also had left lower leg pain related to a degloving that occurred in October.

## 2021-01-13 PROBLEM — E87.6 HYPOKALEMIA: Status: ACTIVE | Noted: 2021-01-13

## 2021-01-13 LAB
ANION GAP SERPL CALC-SCNC: 7 MMOL/L (ref 7–16)
BUN SERPL-MCNC: 12 MG/DL (ref 6–23)
CALCIUM SERPL-MCNC: 8.2 MG/DL (ref 8.3–10.4)
CHLORIDE SERPL-SCNC: 108 MMOL/L (ref 98–107)
CO2 SERPL-SCNC: 25 MMOL/L (ref 21–32)
CREAT SERPL-MCNC: 0.9 MG/DL (ref 0.6–1)
ERYTHROCYTE [DISTWIDTH] IN BLOOD BY AUTOMATED COUNT: 13.6 % (ref 11.9–14.6)
GLUCOSE SERPL-MCNC: 102 MG/DL (ref 65–100)
HCT VFR BLD AUTO: 35.6 % (ref 35.8–46.3)
HGB BLD-MCNC: 11 G/DL (ref 11.7–15.4)
MCH RBC QN AUTO: 27.8 PG (ref 26.1–32.9)
MCHC RBC AUTO-ENTMCNC: 30.9 G/DL (ref 31.4–35)
MCV RBC AUTO: 89.9 FL (ref 79.6–97.8)
NRBC # BLD: 0 K/UL (ref 0–0.2)
PLATELET # BLD AUTO: 364 K/UL (ref 150–450)
PMV BLD AUTO: 8.8 FL (ref 9.4–12.3)
POTASSIUM SERPL-SCNC: 3.2 MMOL/L (ref 3.5–5.1)
RBC # BLD AUTO: 3.96 M/UL (ref 4.05–5.2)
SODIUM SERPL-SCNC: 140 MMOL/L (ref 136–145)
WBC # BLD AUTO: 11.8 K/UL (ref 4.3–11.1)

## 2021-01-13 PROCEDURE — 36415 COLL VENOUS BLD VENIPUNCTURE: CPT

## 2021-01-13 PROCEDURE — 80048 BASIC METABOLIC PNL TOTAL CA: CPT

## 2021-01-13 PROCEDURE — 74011250637 HC RX REV CODE- 250/637: Performed by: FAMILY MEDICINE

## 2021-01-13 PROCEDURE — 96376 TX/PRO/DX INJ SAME DRUG ADON: CPT

## 2021-01-13 PROCEDURE — 74011250636 HC RX REV CODE- 250/636: Performed by: FAMILY MEDICINE

## 2021-01-13 PROCEDURE — 99218 HC RM OBSERVATION: CPT

## 2021-01-13 PROCEDURE — 85027 COMPLETE CBC AUTOMATED: CPT

## 2021-01-13 RX ORDER — POTASSIUM CHLORIDE 20 MEQ/1
40 TABLET, EXTENDED RELEASE ORAL EVERY 4 HOURS
Status: COMPLETED | OUTPATIENT
Start: 2021-01-13 | End: 2021-01-13

## 2021-01-13 RX ADMIN — PANCRELIPASE LIPASE, PANCRELIPASE PROTEASE, PANCRELIPASE AMYLASE 6 CAPSULE: 5000; 17000; 24000 CAPSULE, DELAYED RELEASE ORAL at 12:32

## 2021-01-13 RX ADMIN — HYDROCORTISONE 10 MG: 5 TABLET ORAL at 07:35

## 2021-01-13 RX ADMIN — PANTOPRAZOLE SODIUM 40 MG: 40 TABLET, DELAYED RELEASE ORAL at 16:00

## 2021-01-13 RX ADMIN — PANCRELIPASE LIPASE, PANCRELIPASE PROTEASE, PANCRELIPASE AMYLASE 6 CAPSULE: 5000; 17000; 24000 CAPSULE, DELAYED RELEASE ORAL at 16:00

## 2021-01-13 RX ADMIN — POTASSIUM CHLORIDE 40 MEQ: 1500 TABLET, EXTENDED RELEASE ORAL at 22:47

## 2021-01-13 RX ADMIN — PANTOPRAZOLE SODIUM 40 MG: 40 TABLET, DELAYED RELEASE ORAL at 07:36

## 2021-01-13 RX ADMIN — ONDANSETRON 4 MG: 2 INJECTION INTRAMUSCULAR; INTRAVENOUS at 14:50

## 2021-01-13 RX ADMIN — HYDROMORPHONE HYDROCHLORIDE 4 MG: 4 TABLET ORAL at 07:35

## 2021-01-13 RX ADMIN — PANCRELIPASE LIPASE, PANCRELIPASE PROTEASE, PANCRELIPASE AMYLASE 6 CAPSULE: 5000; 17000; 24000 CAPSULE, DELAYED RELEASE ORAL at 20:43

## 2021-01-13 RX ADMIN — SODIUM CHLORIDE 75 ML/HR: 900 INJECTION, SOLUTION INTRAVENOUS at 20:51

## 2021-01-13 RX ADMIN — POTASSIUM CHLORIDE 40 MEQ: 1500 TABLET, EXTENDED RELEASE ORAL at 20:43

## 2021-01-13 RX ADMIN — HYDROMORPHONE HYDROCHLORIDE 4 MG: 4 TABLET ORAL at 17:37

## 2021-01-13 RX ADMIN — MORPHINE SULFATE 2 MG: 2 INJECTION, SOLUTION INTRAMUSCULAR; INTRAVENOUS at 11:26

## 2021-01-13 RX ADMIN — ONDANSETRON 8 MG: 4 TABLET, ORALLY DISINTEGRATING ORAL at 23:42

## 2021-01-13 RX ADMIN — ONDANSETRON 4 MG: 2 INJECTION INTRAMUSCULAR; INTRAVENOUS at 05:11

## 2021-01-13 RX ADMIN — MONTELUKAST 10 MG: 10 TABLET, FILM COATED ORAL at 07:35

## 2021-01-13 RX ADMIN — FUROSEMIDE 40 MG: 20 TABLET ORAL at 08:37

## 2021-01-13 RX ADMIN — OXYCODONE HYDROCHLORIDE 30 MG: 10 TABLET, FILM COATED, EXTENDED RELEASE ORAL at 20:42

## 2021-01-13 RX ADMIN — MORPHINE SULFATE 2 MG: 2 INJECTION, SOLUTION INTRAMUSCULAR; INTRAVENOUS at 17:37

## 2021-01-13 RX ADMIN — HYDROMORPHONE HYDROCHLORIDE 4 MG: 4 TABLET ORAL at 11:26

## 2021-01-13 RX ADMIN — MORPHINE SULFATE 2 MG: 2 INJECTION, SOLUTION INTRAMUSCULAR; INTRAVENOUS at 14:52

## 2021-01-13 RX ADMIN — HYDROCORTISONE 5 MG: 5 TABLET ORAL at 20:42

## 2021-01-13 RX ADMIN — MORPHINE SULFATE 2 MG: 2 INJECTION, SOLUTION INTRAMUSCULAR; INTRAVENOUS at 01:29

## 2021-01-13 RX ADMIN — HYDROMORPHONE HYDROCHLORIDE 4 MG: 4 TABLET ORAL at 14:52

## 2021-01-13 RX ADMIN — Medication 10 ML: at 06:00

## 2021-01-13 RX ADMIN — HYDROMORPHONE HYDROCHLORIDE 4 MG: 4 TABLET ORAL at 23:41

## 2021-01-13 RX ADMIN — MORPHINE SULFATE 2 MG: 2 INJECTION, SOLUTION INTRAMUSCULAR; INTRAVENOUS at 05:11

## 2021-01-13 RX ADMIN — OXYCODONE HYDROCHLORIDE 30 MG: 10 TABLET, FILM COATED, EXTENDED RELEASE ORAL at 08:37

## 2021-01-13 RX ADMIN — Medication 10 ML: at 20:44

## 2021-01-13 RX ADMIN — Medication 10 ML: at 13:18

## 2021-01-13 RX ADMIN — MORPHINE SULFATE 2 MG: 2 INJECTION, SOLUTION INTRAMUSCULAR; INTRAVENOUS at 20:52

## 2021-01-13 RX ADMIN — SODIUM CHLORIDE 75 ML/HR: 900 INJECTION, SOLUTION INTRAVENOUS at 06:47

## 2021-01-13 RX ADMIN — MORPHINE SULFATE 2 MG: 2 INJECTION, SOLUTION INTRAMUSCULAR; INTRAVENOUS at 08:48

## 2021-01-13 RX ADMIN — ONDANSETRON 4 MG: 2 INJECTION INTRAMUSCULAR; INTRAVENOUS at 01:29

## 2021-01-13 NOTE — PROGRESS NOTES
Hourly rounds completed. All needs met. Pt c/o pain and nausea. PRN meds given per request. Gave report to the oncoming day shift nurse.

## 2021-01-13 NOTE — PROGRESS NOTES
Care Management Interventions  PCP Verified by CM: Yes  Mode of Transport at Discharge: Other (see comment)(Family)  Transition of Care Consult (CM Consult): [address]: No  Reason Outside [place]: Patient already serviced by other home care/hospice agency([facility])  Current Support Network: Own Home, Family Lives Nearby  Confirm Follow Up Transport: Family  Discharge Location  Discharge Placement: Home with home health    SW met with patient and her mother while social distancing and utilizing appropriate PPE. Patient lives alone (with the support of her mother as needed). Patient is independent w/ADLs. Patient has the following DME: cane, rolling walker, bedside commode. Patient is current with [facility] (P: [phone]) for home wound care/RN. Updated orders will need to be faxed to [facility] at discharge (F: [phone]). Family denied any additional needs from  at this time. SW will continue to follow and coordinate resumption of HH at time of discharge.     RADHA Seay  [facility]   [phone]

## 2021-01-13 NOTE — ED NOTES
TRANSFER - OUT REPORT:    Verbal report given to Adriane Gunter RN (name) on Tor Sicks  being transferred to 4th floor (unit) for routine progression of care       Report consisted of patients Situation, Background, Assessment and   Recommendations(SBAR). Information from the following report(s) SBAR and ED Summary was reviewed with the receiving nurse. Lines:   Peripheral IV 01/12/21 Left Forearm (Active)   Site Assessment Clean, dry, & intact 01/12/21 1531   Phlebitis Assessment 0 01/12/21 1531   Infiltration Assessment 0 01/12/21 1531   Dressing Status Clean, dry, & intact 01/12/21 1531   Dressing Type Transparent 01/12/21 1531   Hub Color/Line Status Patent; Flushed 01/12/21 1531   Action Taken Blood drawn 01/12/21 1531        Opportunity for questions and clarification was provided.       Patient transported with:   Registered Nurse

## 2021-01-13 NOTE — WOUND CARE
Patient has chronic wound left leg from degloving accident a few months ago. The left leg is normally cared for by home health, using xeroform, Mepilex transfer and coban 2 multi layer compression 2 times per week on Thursday and Friday. She prefers to avoid a change until Friday to stay on schedule and prevent pain. Discussed with Dr. Chani Clement ok to hold consult and dressing change until Friday. Many of the supplies used are not on formulary here, will attempt to get these supplies, patient did agree to substitutes if necessary. Will need premedicated prior to dressing change.

## 2021-01-13 NOTE — H&P
HOSPITALIST H&P/CONSULT  NAME:  Ricardo Rodriguez   Age:  38 y.o.  :   1980   MRN:   [id number]  PCP: Billy Colby MD  Consulting MD:  Treatment Team: Attending Provider: Chinyere Holland MD; Primary Nurse: Johana Tobin RN  HPI:   Patient is a 43yoF with PMH significant for [place]'s disease and chronic abdominal pain who presents with persistent nausea, vomiting, diarrhea. Patient reports that symptoms started about 3 days ago. She reports that she cannot keep anything down and tells me that she has a history of chronic pancreatitis and often needs \"bowel rest.\"  She states that she has not been able keep down her chronic home medications, and she is concerned that she is not getting her [place]'s medications. She also report LLE discomfort from a \"degloving\" injury that happened a few months ago. This has caused her to miss some appointments for her abdominal issues at [address] over the past several months. Given persistence of symptoms, Hospitalist asked to admit. Complete ROS done and is as stated in HPI or otherwise negative. Past Medical History:   Diagnosis Date    Arthritis     Asthma     uses MDI as needed - rarely-pt can't remember last time needed    Avascular necrosis of femur head     right hip, left hip replacements    Chronic pain     Endocrine disease diagnosed     ADDISONS DISEASE    Endometriosis     hysterectomy    GERD (gastroesophageal reflux disease)     Infectious disease 2007    MRSA-2007, community acquired - in hospital [facility]) about 6 months - 3 months of which was in coma;  HPV-2009     Nausea & vomiting     Other ill-defined conditions(629.89)     allergies    Pancreatitis chronic     7 episodes    Unspecified adverse effect of anesthesia     pt reports \"difficult to sedate    Past Medical History reviewed.   Past Surgical History:   Procedure Laterality Date    HX APPENDECTOMY      HX CHOLECYSTECTOMY      HX GYN hysterectomy    HX ORTHOPAEDIC  2010    right EDIN    HX ORTHOPAEDIC  2010    Left  EDIN    HX OTHER SURGICAL  2007    I&D of 9 abcesses -MRSA    HX SHOULDER REPLACEMENT  12/2011    Right    HX SHOULDER REPLACEMENT  2015    left    HX TONSILLECTOMY        Prior to Admission Medications   Prescriptions Last Dose Informant Patient Reported? Taking? B.infantis-B.ani-B.long-B.bifi (PROBIOTIC 4X) 10-15 mg TbEC   Yes No   Sig: Take 1 Tab by mouth every morning. ESTRADIOL PO   Yes No   Sig: Take  by mouth daily. Epinephrine 0.15 mg/0.15 mL PnIj   Yes No   Sig: by IntraMUSCular route as needed. Epi-pen as needed for allergic reaction   HYDROmorphone (DILAUDID) 4 mg tablet   Yes No   Sig: Take  by mouth every three (3) hours as needed for Pain. Potassium Citrate 15 mEq TbER   Yes No   Sig: Take 15 mEq by mouth two (2) times a day. ZOFRAN 8 mg Tab  Self Yes No   Sig: Take 8 mg by mouth as needed. albuterol (PROVENTIL, VENTOLIN) 90 mcg/Actuation inhaler   Yes No   Sig: Take 2 Puffs by inhalation as needed. calcium citrate 200 mg (950 mg) tablet   Yes No   Sig: Take 200 mg by mouth three (3) times daily. furosemide (LASIX) 40 mg tablet   Yes No   Sig: Take 40 mg by mouth daily. hydrocortisone (CORTEF) 10 mg tablet   Yes No   Sig: Take 10 mg by mouth every morning. hydrocortisone (CORTEF) 5 mg tablet   Yes No   Sig: Take 5 mg by mouth nightly. lipase-protease-amylase (ZENPEP) capsule   Yes No   Sig: Take 1 Cap by mouth Before breakfast, lunch, dinner and at bedtime. montelukast (SINGULAIR) 10 mg tablet   Yes No   Sig: Take 10 mg by mouth every morning. omeprazole (PRILOSEC) 20 mg capsule   Yes No   Sig: Take 20 mg by mouth two (2) times a day. oxyCODONE CR (OXYCONTIN) 40 mg CR tablet   Yes No   Sig: Take 40 mg by mouth every twelve (12) hours.  Indications: CHRONIC PAIN      Facility-Administered Medications: None     Allergies   Allergen Reactions    Latex Anaphylaxis    Largo Oil Anaphylaxis  [place] Nut Anaphylaxis    Peanut Anaphylaxis    Shellfish Containing Products Anaphylaxis    Beef Containing Products Other (comments)     Per allergy testing    Chicken Derived Other (comments)     Per allergy testing    Chlorpromazine Other (comments)     Dystonic      Clindamycin Rash    Dopamine Receptor Agonist Other (comments)     Dystonic      Gluten Other (comments)     Per allergy testing    Hydroxyzine Pamoate Other (comments)     DYSTONIC      Ibuprofen Rash and Photosensitivity    Iodine Rash    Naproxen Sodium Rash    Nubain [Nalbuphine] Rash    Other Medication Other (comments)     Bananas, avacodo, strawberry, potatoes  --- ALL PER ALLERGY TESTING    Pcn [Penicillins] Rash    Povidone-Iodine Rash    Prochlorperazine Edisylate Other (comments)     Dystonic      Promethazine Other (comments)     Dystonic      Reglan [Metoclopramide] Anaphylaxis     dystonia    Soy Other (comments)     Per allergy testing    Sulfa (Sulfonamide Antibiotics) Rash    Toradol [Ketorolac Tromethamine] Rash    Tramadol Rash    Zithromax [Azithromycin] Rash      Social History     Tobacco Use    Smoking status: Never Smoker    Smokeless tobacco: Never Used   Substance Use Topics    Alcohol use: No      Family History   Problem Relation Age of Onset    Cancer Mother         THROID CANCER    Diabetes Maternal Grandmother     Stroke Maternal Grandfather     Heart Disease Paternal Grandmother     Family History reviewed and is non-contributory to current presentation. Objective:     Visit Vitals  BP (!) 177/105   Pulse (!) 103   Temp 98.9 °F (37.2 °C)   Resp 18   Ht 5' 4\" (1.626 m)   Wt 83.5 kg (184 lb)   SpO2 98%   BMI 31.58 kg/m²      Temp (24hrs), Av.9 °F (37.2 °C), Min:98.9 °F (37.2 °C), Max:98.9 °F (37.2 °C)    Oxygen Therapy  O2 Sat (%): 98 % (21)  O2 Device: Room air (21)  Physical Exam:  General:    Alert, cooperative, no distress, appears stated age.      Head: Normocephalic, without obvious abnormality, atraumatic. Nose:  Nares normal. No drainage or sinus tenderness. Lungs:   Clear to auscultation bilaterally. No Wheezing or Rhonchi. No rales. Heart:   Regular rate and rhythm, no murmur, rub or gallop. Abdomen:   Soft. Not distended. Bowel sounds normal. TTP in epigastrium and RUQ. Extremities: No cyanosis. No edema. No clubbing. Skin:     Texture, turgor normal.  Not Jaundiced. Reports needing skin grafts to LLE, but patient reports leg was recently wrapped and does not wish to unwrap at this time. Neurologic: Alert and oriented x 3, no focal deficits. Data Review:   Recent Results (from the past 24 hour(s))   CBC WITH AUTOMATED DIFF    Collection Time: 01/12/21  3:31 PM   Result Value Ref Range    WBC 18.9 (H) 4.3 - 11.1 K/uL    RBC 4.75 4.05 - 5.2 M/uL    HGB 13.2 11.7 - 15.4 g/dL    HCT 43.3 35.8 - 46.3 %    MCV 91.2 79.6 - 97.8 FL    MCH 27.8 26.1 - 32.9 PG    MCHC 30.5 (L) 31.4 - 35.0 g/dL    RDW 13.8 11.9 - 14.6 %    PLATELET 702 136 - 996 K/uL    MPV 9.2 (L) 9.4 - 12.3 FL    ABSOLUTE NRBC 0.00 0.0 - 0.2 K/uL    DF AUTOMATED      NEUTROPHILS 79 (H) 43 - 78 %    LYMPHOCYTES 16 13 - 44 %    MONOCYTES 5 4.0 - 12.0 %    EOSINOPHILS 0 (L) 0.5 - 7.8 %    BASOPHILS 0 0.0 - 2.0 %    IMMATURE GRANULOCYTES 0 0.0 - 5.0 %    ABS. NEUTROPHILS 14.9 (H) 1.7 - 8.2 K/UL    ABS. LYMPHOCYTES 2.9 0.5 - 4.6 K/UL    ABS. MONOCYTES 0.9 0.1 - 1.3 K/UL    ABS. EOSINOPHILS 0.0 0.0 - 0.8 K/UL    ABS. BASOPHILS 0.1 0.0 - 0.2 K/UL    ABS. IMM.  GRANS. 0.1 0.0 - 0.5 K/UL   METABOLIC PANEL, COMPREHENSIVE    Collection Time: 01/12/21  3:31 PM   Result Value Ref Range    Sodium 138 136 - 145 mmol/L    Potassium 5.0 3.5 - 5.1 mmol/L    Chloride 104 98 - 107 mmol/L    CO2 24 21 - 32 mmol/L    Anion gap 10 7 - 16 mmol/L    Glucose 109 (H) 65 - 100 mg/dL    BUN 10 6 - 23 MG/DL    Creatinine 0.91 0.6 - 1.0 MG/DL    GFR est AA >60 >60 ml/min/1.73m2    GFR est non-AA >60 >60 ml/min/1.73m2 Calcium 9.6 8.3 - 10.4 MG/DL    Bilirubin, total 0.3 0.2 - 1.1 MG/DL    ALT (SGPT) 19 12 - 65 U/L    AST (SGOT) 30 15 - 37 U/L    Alk. phosphatase 77 50 - 136 U/L    Protein, total 7.6 6.3 - 8.2 g/dL    Albumin 3.1 (L) 3.5 - 5.0 g/dL    Globulin 4.5 (H) 2.3 - 3.5 g/dL    A-G Ratio 0.7 (L) 1.2 - 3.5     LIPASE    Collection Time: 01/12/21  3:31 PM   Result Value Ref Range    Lipase 198 73 - 393 U/L   MAGNESIUM    Collection Time: 01/12/21  3:31 PM   Result Value Ref Range    Magnesium 1.9 1.8 - 2.4 mg/dL   URINALYSIS W/ RFLX MICROSCOPIC    Collection Time: 01/12/21  4:42 PM   Result Value Ref Range    Color YELLOW      Appearance CLEAR      Specific gravity 1.017 1.001 - 1.023      pH (UA) 7.5 5.0 - 9.0      Protein Negative NEG mg/dL    Glucose Negative mg/dL    Ketone Negative NEG mg/dL    Bilirubin Negative NEG      Blood Negative NEG      Urobilinogen 0.2 0.2 - 1.0 EU/dL    Nitrites Negative NEG      Leukocyte Esterase TRACE (A) NEG      WBC 0-3 0 /hpf    RBC 0-3 0 /hpf    Epithelial cells 0-3 0 /hpf    Bacteria 0 0 /hpf    Casts 0-3 0 /lpf   DRUG SCREEN, URINE    Collection Time: 01/12/21  4:42 PM   Result Value Ref Range    PCP(PHENCYCLIDINE) Negative      BENZODIAZEPINES Negative      COCAINE Negative      AMPHETAMINES Negative      METHADONE Negative      THC (TH-CANNABINOL) Negative      OPIATES Positive      BARBITURATES Negative     LACTIC ACID    Collection Time: 01/12/21  8:06 PM   Result Value Ref Range    Lactic acid 2.0 0.4 - 2.0 MMOL/L     Imaging /Procedures /Studies     Assessment and Plan:      Active Hospital Problems    Diagnosis Date Noted    Nausea & vomiting 01/12/2021    Chronic abdominal pain 05/22/2020    Leg skin lesion, left 05/14/2016    [place]'s disease ([facility] [address] 12/10/2011       PLAN  Nausea/Vomiting/Diarrhea/Abdominal Pain  - History of chronic pancreatitis  - CT abdomen is okay  - Will treat symptomatically with IV zofran, morphine  - Elevated WBC is likely due to chronic steroids, no other source of infection found  - NPO except meds and sips    [place]'s Disease  - Restart home PO meds  - Hopefully with symptomatic treatment of N/V, patient will be able to tolerate medications    LLE skin lesions  - Reports \"degloving\" injury  - Unable to visualize leg at this time  - Will consult with Wound Care while hospitalized      Anticipated discharge: 1 day. Observation status appropriate at this time.     Signed By: Gladis Sharp MD     January 12, 2021

## 2021-01-13 NOTE — PROGRESS NOTES
Hospitalist Progress Note     Admit Date:  2021  3:34 PM   Name:  Betsy Booker   Age:  38 y.o.  :  1980   MRN:  [id number]   PCP:  Ken Laws MD  Treatment Team: Attending Provider: Suzanne Shrestha MD; Utilization Review: Yaniv Limon RN; Care Manager: Nnamdi Magana    Subjective:     Patient is a 43yoF with PMH significant for [place]'s disease and chronic abdominal pain who presents with persistent nausea, vomiting, diarrhea. Patient reports that symptoms started about 3 days ago. She reports that she cannot keep anything down and tells me that she has a history of chronic pancreatitis and often needs \"bowel rest.\"  She states that she has not been able keep down her chronic home medications, and she is concerned that she is not getting her [place]'s medications. She also report LLE discomfort from a \"degloving\" injury that happened a few months ago. This has caused her to miss some appointments for her abdominal issues at [address] over the past several months. Given persistence of symptoms, Hospitalist asked to admit. 2021  Family at bedside  Pt says nausea , vomiting, abdominal pain and mild left leg pain better    Objective:     Patient Vitals for the past 24 hrs:   Temp Pulse Resp BP SpO2   21 1445 98.3 °F (36.8 °C) 89 16 (!) 151/91 96 %   21 1040 98 °F (36.7 °C) 73 18 135/85 96 %   21 0753 98.7 °F (37.1 °C) 84 17 (!) 143/92 97 %   21 0322 98.1 °F (36.7 °C) 76 18 128/79 99 %   21 2350 97.9 °F (36.6 °C) 84 18 (!) 139/94 100 %   218  (!) 103 18 (!) 177/105 98 %     Oxygen Therapy  O2 Sat (%): 96 % (21 1445)  O2 Device: Room air (21)    Intake/Output Summary (Last 24 hours) at 2021 1851  Last data filed at 2021 1604  Gross per 24 hour   Intake 686 ml   Output    Net 686 ml         General:    Well nourished. Alert. HEENT-normal  CV:   RRR. No murmur, rub, or gallop.   Lungs: Clear to auscultation bilaterally. No wheezing, rhonchi, or rales. Abdomen:   Soft, mild tender rt upper quadrant, nondistended. Cns- no focal neurological deficits   Extremities: Warm and dry. No cyanosis or edema. Skin:     No rashes or jaundice. Data Review:  I have reviewed all labs, meds, telemetry events, and studies from the last 24 hours.     Recent Results (from the past 24 hour(s))   LACTIC ACID    Collection Time: 01/12/21  8:06 PM   Result Value Ref Range    Lactic acid 2.0 0.4 - 2.0 MMOL/L   METABOLIC PANEL, BASIC    Collection Time: 01/13/21  1:59 PM   Result Value Ref Range    Sodium 140 136 - 145 mmol/L    Potassium 3.2 (L) 3.5 - 5.1 mmol/L    Chloride 108 (H) 98 - 107 mmol/L    CO2 25 21 - 32 mmol/L    Anion gap 7 7 - 16 mmol/L    Glucose 102 (H) 65 - 100 mg/dL    BUN 12 6 - 23 MG/DL    Creatinine 0.90 0.6 - 1.0 MG/DL    GFR est AA >60 >60 ml/min/1.73m2    GFR est non-AA >60 >60 ml/min/1.73m2    Calcium 8.2 (L) 8.3 - 10.4 MG/DL   CBC W/O DIFF    Collection Time: 01/13/21  1:59 PM   Result Value Ref Range    WBC 11.8 (H) 4.3 - 11.1 K/uL    RBC 3.96 (L) 4.05 - 5.2 M/uL    HGB 11.0 (L) 11.7 - 15.4 g/dL    HCT 35.6 (L) 35.8 - 46.3 %    MCV 89.9 79.6 - 97.8 FL    MCH 27.8 26.1 - 32.9 PG    MCHC 30.9 (L) 31.4 - 35.0 g/dL    RDW 13.6 11.9 - 14.6 %    PLATELET 264 383 - 749 K/uL    MPV 8.8 (L) 9.4 - 12.3 FL    ABSOLUTE NRBC 0.00 0.0 - 0.2 K/uL        All Micro Results     None          Current Meds:  Current Facility-Administered Medications   Medication Dose Route Frequency    lipase-protease-amylase (ZENPEP 5,000) capsule 6 Cap  6 Cap Oral AC&HS    potassium chloride (K-DUR, KLOR-CON) SR tablet 40 mEq  40 mEq Oral Q4H    morphine injection 2 mg  2 mg IntraVENous Q2H PRN    ondansetron (ZOFRAN) injection 4 mg  4 mg IntraVENous Q4H PRN    ondansetron (ZOFRAN ODT) tablet 8 mg  8 mg Oral Q6H PRN    albuterol (PROVENTIL HFA, VENTOLIN HFA, PROAIR HFA) inhaler 2 Puff  2 Puff Inhalation Q4H PRN  montelukast (SINGULAIR) tablet 10 mg  10 mg Oral 7am    HYDROmorphone (DILAUDID) tablet 4 mg  4 mg Oral Q3H PRN    oxyCODONE ER (OxyCONTIN) tablet 30 mg  30 mg Oral Q12H    pantoprazole (PROTONIX) tablet 40 mg  40 mg Oral ACB&D    hydrocortisone (CORTEF) tablet 10 mg  10 mg Oral 7am    hydrocortisone (CORTEF) tablet 5 mg  5 mg Oral QHS    furosemide (LASIX) tablet 40 mg  40 mg Oral DAILY    sodium chloride (NS) flush 5-40 mL  5-40 mL IntraVENous Q8H    sodium chloride (NS) flush 5-40 mL  5-40 mL IntraVENous PRN    acetaminophen (TYLENOL) tablet 650 mg  650 mg Oral Q6H PRN    Or    acetaminophen (TYLENOL) suppository 650 mg  650 mg Rectal Q6H PRN    polyethylene glycol (MIRALAX) packet 17 g  17 g Oral DAILY PRN    enoxaparin (LOVENOX) injection 40 mg  40 mg SubCUTAneous DAILY    0.9% sodium chloride infusion  75 mL/hr IntraVENous CONTINUOUS       Other Studies (last 24 hours):  Ct Abd Pelv Wo Cont    Result Date: 1/12/2021  CT ABDOMEN AND PELVIS WITH CONTRAST. HISTORY: Abdominal pain, leukocytosis with nausea and vomiting and diarrhea. Prior history of pancreatitis. COMPARISON: May 2020 and March 2009. TECHNIQUE: 5 mm axial scans from above the diaphragms to the pubic symphysis without contrast.  Radiation dose reduction techniques were used for this study. Our CT scanners use one or more of the following:  Automated exposure control, adjustment of the mA and or kV according to patient size, iterative reconstruction. FINDINGS: -Lung Bases: The lung bases are clear. . -Liver: Appears uniform on this noncontrast exam except for small chronic calcification laterally. -Gallbladder/Bile Ducts: Gallbladder is absent. Biliary tree is not dilated. -Pancreas: Normal. -Spleen: Uniform and normal size. -Stomach: Unremarkable. -Bowel: Normal caliber. No inflammatory changes. Scattered sigmoid diverticula. Surgery clips, likely from cholecystectomy in the right lower quadrant -Kidneys/Ureters: Normal size.  No radiopaque calculi. 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 -Adrenals: Are normal size. -Reproductive Organs: Obscured due to hip prostheses. -Lymph Nodes: No grossly enlarged retroperitoneal, mesenteric, or pelvic adenopathy. -Vasculature: Aorta is normal caliber. -Bones: No gross bony lesions. -Other: No ascites. IMPRESSION:  No acute abnormality on this noncontrast exam.       Assessment and Plan:     Hospital Problems as of 1/13/2021 Date Reviewed: 2/12/2016          Codes Class Noted - Resolved POA    Hypokalemia ICD-10-CM: E87.6  ICD-9-CM: 276.8  1/13/2021 - Present Unknown        * (Principal) Nausea & vomiting ICD-10-CM: R11.2  ICD-9-CM: 787.01  1/12/2021 - Present Unknown        Chronic abdominal pain (Chronic) ICD-10-CM: R10.9, G89.29  ICD-9-CM: 789.00, 338.29  5/22/2020 - Present Yes        Leg skin lesion, left ICD-10-CM: L98.9  ICD-9-CM: 709.9  5/14/2016 - Present Yes        [place] disease ([facility] (Chronic) ICD-10-CM: E27.1  ICD-9-CM: 255.41  12/10/2011 - Present Yes              PLAN:    Nausea/Vomiting/Diarrhea/Abdominal Pain- improving  - History of chronic pancreatitis  - CT abdomen is okay  - Will treat symptomatically with IV zofran, morphine  - Elevated WBC is likely due to chronic steroids, no other source of infection found  - will add clear liquids     Roney's Disease  - Restart home PO meds  - Hopefully with symptomatic treatment of N/V, patient will be able to tolerate medications     LLE skin lesions  - Reports \"degloving\" injury  -dressing in place. Saw the pictures which pt showed prior to dressing on Tuesday.  Explained no concerns based on pictures and also present clinical examiantion    - hypokalemia- replace    Signed:  Nancy Taylor MD

## 2021-01-14 VITALS
DIASTOLIC BLOOD PRESSURE: 84 MMHG | TEMPERATURE: 98.2 F | WEIGHT: 184 LBS | HEART RATE: 84 BPM | SYSTOLIC BLOOD PRESSURE: 138 MMHG | RESPIRATION RATE: 18 BRPM | BODY MASS INDEX: 31.41 KG/M2 | OXYGEN SATURATION: 91 % | HEIGHT: 64 IN

## 2021-01-14 LAB
ANION GAP SERPL CALC-SCNC: 4 MMOL/L (ref 7–16)
BUN SERPL-MCNC: 14 MG/DL (ref 6–23)
CALCIUM SERPL-MCNC: 8 MG/DL (ref 8.3–10.4)
CHLORIDE SERPL-SCNC: 114 MMOL/L (ref 98–107)
CO2 SERPL-SCNC: 24 MMOL/L (ref 21–32)
CREAT SERPL-MCNC: 0.81 MG/DL (ref 0.6–1)
ERYTHROCYTE [DISTWIDTH] IN BLOOD BY AUTOMATED COUNT: 13.8 % (ref 11.9–14.6)
GLUCOSE SERPL-MCNC: 87 MG/DL (ref 65–100)
HCT VFR BLD AUTO: 35.6 % (ref 35.8–46.3)
HGB BLD-MCNC: 10.5 G/DL (ref 11.7–15.4)
MAGNESIUM SERPL-MCNC: 1.9 MG/DL (ref 1.8–2.4)
MCH RBC QN AUTO: 27.3 PG (ref 26.1–32.9)
MCHC RBC AUTO-ENTMCNC: 29.5 G/DL (ref 31.4–35)
MCV RBC AUTO: 92.5 FL (ref 79.6–97.8)
NRBC # BLD: 0 K/UL (ref 0–0.2)
PLATELET # BLD AUTO: 364 K/UL (ref 150–450)
PMV BLD AUTO: 9.2 FL (ref 9.4–12.3)
POTASSIUM SERPL-SCNC: 4.2 MMOL/L (ref 3.5–5.1)
RBC # BLD AUTO: 3.85 M/UL (ref 4.05–5.2)
SODIUM SERPL-SCNC: 142 MMOL/L (ref 136–145)
WBC # BLD AUTO: 9.6 K/UL (ref 4.3–11.1)

## 2021-01-14 PROCEDURE — 74011250637 HC RX REV CODE- 250/637: Performed by: FAMILY MEDICINE

## 2021-01-14 PROCEDURE — 99218 HC RM OBSERVATION: CPT

## 2021-01-14 PROCEDURE — 96372 THER/PROPH/DIAG INJ SC/IM: CPT

## 2021-01-14 PROCEDURE — 85027 COMPLETE CBC AUTOMATED: CPT

## 2021-01-14 PROCEDURE — 80048 BASIC METABOLIC PNL TOTAL CA: CPT

## 2021-01-14 PROCEDURE — 83735 ASSAY OF MAGNESIUM: CPT

## 2021-01-14 PROCEDURE — 96376 TX/PRO/DX INJ SAME DRUG ADON: CPT

## 2021-01-14 PROCEDURE — 36415 COLL VENOUS BLD VENIPUNCTURE: CPT

## 2021-01-14 PROCEDURE — 74011250636 HC RX REV CODE- 250/636: Performed by: FAMILY MEDICINE

## 2021-01-14 RX ADMIN — Medication 10 ML: at 05:24

## 2021-01-14 RX ADMIN — Medication 10 ML: at 08:12

## 2021-01-14 RX ADMIN — PANTOPRAZOLE SODIUM 40 MG: 40 TABLET, DELAYED RELEASE ORAL at 08:00

## 2021-01-14 RX ADMIN — HYDROCORTISONE 10 MG: 5 TABLET ORAL at 07:59

## 2021-01-14 RX ADMIN — PANCRELIPASE LIPASE, PANCRELIPASE PROTEASE, PANCRELIPASE AMYLASE 6 CAPSULE: 5000; 17000; 24000 CAPSULE, DELAYED RELEASE ORAL at 11:23

## 2021-01-14 RX ADMIN — MORPHINE SULFATE 2 MG: 2 INJECTION, SOLUTION INTRAMUSCULAR; INTRAVENOUS at 05:26

## 2021-01-14 RX ADMIN — HYDROMORPHONE HYDROCHLORIDE 4 MG: 4 TABLET ORAL at 12:32

## 2021-01-14 RX ADMIN — SODIUM CHLORIDE 75 ML/HR: 900 INJECTION, SOLUTION INTRAVENOUS at 11:25

## 2021-01-14 RX ADMIN — MORPHINE SULFATE 2 MG: 2 INJECTION, SOLUTION INTRAMUSCULAR; INTRAVENOUS at 11:23

## 2021-01-14 RX ADMIN — ENOXAPARIN SODIUM 40 MG: 40 INJECTION SUBCUTANEOUS at 08:01

## 2021-01-14 RX ADMIN — PANCRELIPASE LIPASE, PANCRELIPASE PROTEASE, PANCRELIPASE AMYLASE 6 CAPSULE: 5000; 17000; 24000 CAPSULE, DELAYED RELEASE ORAL at 08:00

## 2021-01-14 RX ADMIN — MONTELUKAST 10 MG: 10 TABLET, FILM COATED ORAL at 08:00

## 2021-01-14 RX ADMIN — MORPHINE SULFATE 2 MG: 2 INJECTION, SOLUTION INTRAMUSCULAR; INTRAVENOUS at 02:07

## 2021-01-14 RX ADMIN — OXYCODONE HYDROCHLORIDE 30 MG: 10 TABLET, FILM COATED, EXTENDED RELEASE ORAL at 07:58

## 2021-01-14 RX ADMIN — MORPHINE SULFATE 2 MG: 2 INJECTION, SOLUTION INTRAMUSCULAR; INTRAVENOUS at 08:12

## 2021-01-14 RX ADMIN — HYDROMORPHONE HYDROCHLORIDE 4 MG: 4 TABLET ORAL at 08:00

## 2021-01-14 RX ADMIN — FUROSEMIDE 40 MG: 20 TABLET ORAL at 08:00

## 2021-01-14 RX ADMIN — ONDANSETRON 4 MG: 2 INJECTION INTRAMUSCULAR; INTRAVENOUS at 08:12

## 2021-01-14 NOTE — PROGRESS NOTES
Discharge instructions and medication changes and follow up instructions given. Patient verbalized understanding. Patient stable at discharge and taken out at designated time.

## 2021-01-14 NOTE — PROGRESS NOTES
Problem: Falls - Risk of  Goal: *Absence of Falls  Description: Document Kevon Tubbs Fall Risk and appropriate interventions in the flowsheet. Outcome: Progressing Towards Goal  Note: Fall Risk Interventions:  Mobility Interventions: Communicate number of staff needed for ambulation/transfer, Patient to call before getting OOB         Medication Interventions: Evaluate medications/consider consulting pharmacy, Patient to call before getting OOB, Teach patient to arise slowly    Elimination Interventions: Call light in reach, Patient to call for help with toileting needs, Toilet paper/wipes in reach    History of Falls Interventions: Consult care management for discharge planning, Evaluate medications/consider consulting pharmacy, Investigate reason for fall         Problem: Pressure Injury - Risk of  Goal: *Prevention of pressure injury  Description: Document Rei Scale and appropriate interventions in the flowsheet. Outcome: Progressing Towards Goal  Note: Pressure Injury Interventions:             Activity Interventions: Increase time out of bed, Pressure redistribution bed/mattress(bed type)    Mobility Interventions: HOB 30 degrees or less, Pressure redistribution bed/mattress (bed type)    Nutrition Interventions: Offer support with meals,snacks and hydration, Document food/fluid/supplement intake

## 2021-01-14 NOTE — DISCHARGE INSTRUCTIONS
F/u with pcp in a week with cbc, bmp and magnesium. Keep appointment with Wound care tomorrow. Abdominal Pain: Care Instructions  Your Care Instructions     Abdominal pain has many possible causes. Some aren't serious and get better on their own in a few days. Others need more testing and treatment. If your pain continues or gets worse, you need to be rechecked and may need more tests to find out what is wrong. You may need surgery to correct the problem. Don't ignore new symptoms, such as fever, nausea and vomiting, urination problems, pain that gets worse, and dizziness. These may be signs of a more serious problem. Your doctor may have recommended a follow-up visit in the next 8 to 12 hours. If you are not getting better, you may need more tests or treatment. The doctor has checked you carefully, but problems can develop later. If you notice any problems or new symptoms, get medical treatment right away. Follow-up care is a key part of your treatment and safety. Be sure to make and go to all appointments, and call your doctor if you are having problems. It's also a good idea to know your test results and keep a list of the medicines you take. How can you care for yourself at home? · Rest until you feel better. · To prevent dehydration, drink plenty of fluids, enough so that your urine is light yellow or clear like water. Choose water and other caffeine-free clear liquids until you feel better. If you have kidney, heart, or liver disease and have to limit fluids, talk with your doctor before you increase the amount of fluids you drink. · If your stomach is upset, eat mild foods, such as rice, dry toast or crackers, bananas, and applesauce. Try eating several small meals instead of two or three large ones. · Wait until 48 hours after all symptoms have gone away before you have spicy foods, alcohol, and drinks that contain caffeine. · Do not eat foods that are high in fat.   · Avoid anti-inflammatory medicines such as aspirin, ibuprofen (Advil, Motrin), and naproxen (Aleve). These can cause stomach upset. Talk to your doctor if you take daily aspirin for another health problem. When should you call for help? Call 911 anytime you think you may need emergency care. For example, call if:    · You passed out (lost consciousness).     · You pass maroon or very bloody stools.     · You vomit blood or what looks like coffee grounds.     · You have new, severe belly pain. Call your doctor now or seek immediate medical care if:    · Your pain gets worse, especially if it becomes focused in one area of your belly.     · You have a new or higher fever.     · Your stools are black and look like tar, or they have streaks of blood.     · You have unexpected vaginal bleeding.     · You have symptoms of a urinary tract infection. These may include:  ? Pain when you urinate. ? Urinating more often than usual.  ? Blood in your urine.     · You are dizzy or lightheaded, or you feel like you may faint. Watch closely for changes in your health, and be sure to contact your doctor if:    · You are not getting better after 1 day (24 hours).

## 2021-01-14 NOTE — PROGRESS NOTES
Fax sent to [facility] to resume services (RN for wound care).     RADHA Cooper  [facility]   [phone]

## 2021-01-14 NOTE — DISCHARGE SUMMARY
Hospitalist Discharge Summary     Admit Date:  2021  3:34 PM   Name:  Joceline Stinson   Age:  38 y.o.  :  1980   MRN:  [id number]   PCP:  Tamiko Black MD  Treatment Team: Attending Provider: Marci Robles MD; Utilization Review: Esperanza Pacheco RN; Care Manager: Isis Thorne    Problem List for this Hospitalization:  Hospital Problems as of 2021 Date Reviewed: 2016          Codes Class Noted - Resolved POA    Hypokalemia ICD-10-CM: E87.6  ICD-9-CM: 276.8  2021 - Present Unknown        * (Principal) Nausea & vomiting ICD-10-CM: R11.2  ICD-9-CM: 787.01  2021 - Present Unknown        Chronic abdominal pain (Chronic) ICD-10-CM: R10.9, G89.29  ICD-9-CM: 789.00, 338.29  2020 - Present Yes        Leg skin lesion, left ICD-10-CM: L98.9  ICD-9-CM: 709.9  2016 - Present Yes        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  Patient reports that symptoms started about 3 days ago. Lizandro Hurley reports that she cannot keep anything down and tells me that she has a history of chronic pancreatitis and often needs \"bowel rest.\"  She states that she has not been able keep down her chronic home medications, and she is concerned that she is not getting her Carter's medications.  She also report LLE discomfort from a \"degloving\" injury that happened a few months ago.  This has caused her to miss some appointments for her abdominal issues at [address] over the past several months.  Given persistence of symptoms, Hospitalist asked to admit. Hospital Course:  Pt hypokalemia replaced, normal at discharge. Pt is on potassium supplementation at home. Pt does get dressing at wound care Tuesday and Friday. Advised to keep appointment. pt was able to tolerate full liquids. Advised to advance to gi soft diet. No more nausea or abdominal pain or any increased leg pain. Follow up instructions below. Plan was discussed with patient and family member at bedside. All questions answered. Patient was stable at time of discharge and was instructed to call or return if there are any concerns or recurrence of symptoms. Diagnostic Imaging/Tests:   Xr Abd Acute W 1 V Chest    Result Date: 1/12/2021  Indication:  Nausea and vomiting. Comparison:  None. Findings:  Bowel: No bowel distention  Soft tissues:  No obvious foreign body or swelling     Impression:  Nondistended bowel gas pattern. Ct Abd Pelv Wo Cont    Result Date: 1/12/2021  CT ABDOMEN AND PELVIS WITH CONTRAST. HISTORY: Abdominal pain, leukocytosis with nausea and vomiting and diarrhea. Prior history of pancreatitis. COMPARISON: May 2020 and March 2009. TECHNIQUE: 5 mm axial scans from above the diaphragms to the pubic symphysis without contrast.  Radiation dose reduction techniques were used for this study. Our CT scanners use one or more of the following:  Automated exposure control, adjustment of the mA and or kV according to patient size, iterative reconstruction. FINDINGS: -Lung Bases: The lung bases are clear. . -Liver: Appears uniform on this noncontrast exam except for small chronic calcification laterally. -Gallbladder/Bile Ducts: Gallbladder is absent. Biliary tree is not dilated. -Pancreas: Normal. -Spleen: Uniform and normal size. -Stomach: Unremarkable. -Bowel: Normal caliber. No inflammatory changes. Scattered sigmoid diverticula. Surgery clips, likely from cholecystectomy in the right lower quadrant -Kidneys/Ureters: Normal size. No radiopaque calculi. 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 -Adrenals: Are normal size.  -Reproductive Organs: Obscured due to hip prostheses. -Lymph Nodes: No grossly enlarged retroperitoneal, mesenteric, or pelvic adenopathy. -Vasculature: Aorta is normal caliber. -Bones: No gross bony lesions. -Other: No ascites. IMPRESSION:  No acute abnormality on this noncontrast exam.       Echocardiogram results:  No results found for this visit on 01/12/21.       All Micro Results     None          Labs: Results:       BMP, Mg, Phos Recent Labs     01/14/21  0623 01/13/21  1359 01/12/21  1531    140 138   K 4.2 3.2* 5.0   * 108* 104   CO2 24 25 24   AGAP 4* 7 10   BUN 14 12 10   CREA 0.81 0.90 0.91   CA 8.0* 8.2* 9.6   GLU 87 102* 109*   MG 1.9  --  1.9      CBC Recent Labs     01/14/21  0623 01/13/21  1359 01/12/21  1531   WBC 9.6 11.8* 18.9*   RBC 3.85* 3.96* 4.75   HGB 10.5* 11.0* 13.2   HCT 35.6* 35.6* 43.3    364 432   GRANS  --   --  79*   LYMPH  --   --  16   EOS  --   --  0*   MONOS  --   --  5   BASOS  --   --  0   IG  --   --  0   ANEU  --   --  14.9*   ABL  --   --  2.9   JACQUE  --   --  0.0   ABM  --   --  0.9   ABB  --   --  0.1   AIG  --   --  0.1      LFT Recent Labs     01/12/21  1531   ALT 19   AP 77   TP 7.6   ALB 3.1*   GLOB 4.5*   AGRAT 0.7*      Cardiac Testing No results found for: BNPP, BNP, CPK, RCK1, RCK2, RCK3, RCK4, CKMB, CKNDX, CKND1, TROPT, TROIQ   Coagulation Tests Lab Results   Component Value Date/Time    Prothrombin time 10.2 10/14/2015 07:03 PM    Prothrombin time 10.3 12/05/2011 09:45 AM    INR 1.0 10/14/2015 07:03 PM    INR 1.0 12/05/2011 09:45 AM    aPTT 21.0 (L) 10/14/2015 07:03 PM    aPTT 24.3 (L) 12/05/2011 09:45 AM      A1c Lab Results   Component Value Date/Time    Hemoglobin A1c 5.9 01/16/2015 09:21 AM    Hemoglobin A1c 5.4 04/26/2012 09:30 AM      Lipid Panel No results found for: CHOL, CHOLPOCT, CHOLX, CHLST, CHOLV, 877820, HDL, HDLP, LDL, LDLC, DLDLP, 946955, VLDLC, VLDL, TGLX, TRIGL, TRIGP, TGLPOCT, CHHD, CHHDX   Thyroid Panel Lab Results   Component Value Date/Time    TSH 0.226 (L) 01/16/2015 09:19 AM    TSH 0.860 12/23/2013 12:31 PM        Most Recent UA Lab Results   Component Value Date/Time    Color YELLOW 01/12/2021 04:42 PM    Appearance CLEAR 01/12/2021 04:42 PM    Specific gravity 1.017 01/12/2021 04:42 PM    pH (UA) 7.5 01/12/2021 04:42 PM    Protein Negative 01/12/2021 04:42 PM    Glucose Negative 01/12/2021 04:42 PM    Ketone Negative 01/12/2021 04:42 PM    Bilirubin Negative 01/12/2021 04:42 PM    Blood Negative 01/12/2021 04:42 PM    Urobilinogen 0.2 01/12/2021 04:42 PM    Nitrites Negative 01/12/2021 04:42 PM    Leukocyte Esterase TRACE (A) 01/12/2021 04:42 PM        Allergies   Allergen Reactions    Latex Anaphylaxis    [place] Oil Anaphylaxis    [place] Nut Anaphylaxis    Peanut Anaphylaxis    Shellfish Containing Products Anaphylaxis    Beef Containing Products Other (comments)     Per allergy testing    Chicken Derived Other (comments)     Per allergy testing    Chlorpromazine Other (comments)     Dystonic      Clindamycin Rash    Dopamine Receptor Agonist Other (comments)     Dystonic      Gluten Other (comments)     Per allergy testing    Hydroxyzine Pamoate Other (comments)     DYSTONIC      Ibuprofen Rash and Photosensitivity    Iodine Rash    Naproxen Sodium Rash    Nubain [Nalbuphine] Rash    Other Medication Other (comments)     Bananas, avacodo, strawberry, potatoes  --- ALL PER ALLERGY TESTING    Pcn [Penicillins] Rash    Povidone-Iodine Rash    Prochlorperazine Edisylate Other (comments)     Dystonic      Promethazine Other (comments)     Dystonic      Reglan [Metoclopramide] Anaphylaxis     dystonia    Soy Other (comments)     Per allergy testing    Sulfa (Sulfonamide Antibiotics) Rash    Toradol [Ketorolac Tromethamine] Rash    Tramadol Rash    Zithromax [Azithromycin] Rash     Immunization History   Administered Date(s) Administered    TD Vaccine 05/07/2009       All Labs from Last 24 Hrs:  Recent Results (from the past 24 hour(s))   METABOLIC PANEL, BASIC    Collection Time: 01/13/21  1:59 PM   Result Value Ref Range    Sodium 140 136 - 145 mmol/L    Potassium 3.2 (L) 3.5 - 5.1 mmol/L    Chloride 108 (H) 98 - 107 mmol/L    CO2 25 21 - 32 mmol/L    Anion gap 7 7 - 16 mmol/L    Glucose 102 (H) 65 - 100 mg/dL    BUN 12 6 - 23 MG/DL    Creatinine 0.90 0.6 - 1.0 MG/DL    GFR est AA >60 >60 ml/min/1.73m2    GFR est non-AA >60 >60 ml/min/1.73m2    Calcium 8.2 (L) 8.3 - 10.4 MG/DL   CBC W/O DIFF    Collection Time: 01/13/21  1:59 PM   Result Value Ref Range    WBC 11.8 (H) 4.3 - 11.1 K/uL    RBC 3.96 (L) 4.05 - 5.2 M/uL    HGB 11.0 (L) 11.7 - 15.4 g/dL    HCT 35.6 (L) 35.8 - 46.3 %    MCV 89.9 79.6 - 97.8 FL    MCH 27.8 26.1 - 32.9 PG    MCHC 30.9 (L) 31.4 - 35.0 g/dL    RDW 13.6 11.9 - 14.6 %    PLATELET 526 542 - 365 K/uL    MPV 8.8 (L) 9.4 - 12.3 FL    ABSOLUTE NRBC 0.00 0.0 - 0.2 K/uL   METABOLIC PANEL, BASIC    Collection Time: 01/14/21  6:23 AM   Result Value Ref Range    Sodium 142 136 - 145 mmol/L    Potassium 4.2 3.5 - 5.1 mmol/L    Chloride 114 (H) 98 - 107 mmol/L    CO2 24 21 - 32 mmol/L    Anion gap 4 (L) 7 - 16 mmol/L    Glucose 87 65 - 100 mg/dL    BUN 14 6 - 23 MG/DL    Creatinine 0.81 0.6 - 1.0 MG/DL    GFR est AA >60 >60 ml/min/1.73m2    GFR est non-AA >60 >60 ml/min/1.73m2    Calcium 8.0 (L) 8.3 - 10.4 MG/DL   CBC W/O DIFF    Collection Time: 01/14/21  6:23 AM   Result Value Ref Range    WBC 9.6 4.3 - 11.1 K/uL    RBC 3.85 (L) 4.05 - 5.2 M/uL    HGB 10.5 (L) 11.7 - 15.4 g/dL    HCT 35.6 (L) 35.8 - 46.3 %    MCV 92.5 79.6 - 97.8 FL    MCH 27.3 26.1 - 32.9 PG    MCHC 29.5 (L) 31.4 - 35.0 g/dL    RDW 13.8 11.9 - 14.6 %    PLATELET 212 344 - 163 K/uL    MPV 9.2 (L) 9.4 - 12.3 FL    ABSOLUTE NRBC 0.00 0.0 - 0.2 K/uL   MAGNESIUM    Collection Time: 01/14/21  6:23 AM   Result Value Ref Range    Magnesium 1.9 1.8 - 2.4 mg/dL       Discharge Exam:  Patient Vitals for the past 24 hrs:   Temp Pulse Resp BP SpO2   01/14/21 1108 98.2 °F (36.8 °C) 84 18 138/84 91 %   01/14/21 0712 97.9 °F (36.6 °C) 84 18 (!) 140/77 95 % 01/14/21 0410 98.1 °F (36.7 °C) 91 18 124/74 93 %   01/13/21 2314 97.7 °F (36.5 °C) 91 16 133/88 95 %   01/13/21 1915 98.5 °F (36.9 °C) 91 16 (!) 147/91 97 %   01/13/21 1445 98.3 °F (36.8 °C) 89 16 (!) 151/91 96 %     Oxygen Therapy  O2 Sat (%): 91 % (01/14/21 1108)  O2 Device: Room air (01/14/21 0800)    Intake/Output Summary (Last 24 hours) at 1/14/2021 1253  Last data filed at 1/14/2021 0526  Gross per 24 hour   Intake 1210 ml   Output    Net 1210 ml       General:    Well nourished. Alert. No distress. Eyes:   Normal sclera. Extraocular movements intact. ENT:  Normocephalic, atraumatic. Moist mucous membranes  CV:   Regular rate and rhythm. No murmur, rub, or gallop. Lungs:  Clear to auscultation bilaterally. No wheezing, rhonchi, or rales. Abdomen: Soft, nontender, nondistended. Bowel sounds normal.   Extremities: Warm and dry. No cyanosis or edema. Neurologic: CN II-XII grossly intact. Sensation intact. Skin:     No rashes or jaundice. Dressing to the left lower extremity  Psych:  Normal mood and affect. Discharge Info:   Current Discharge Medication List      CONTINUE these medications which have NOT CHANGED    Details   !! hydrocortisone (CORTEF) 10 mg tablet Take 10 mg by mouth every morning. !! hydrocortisone (CORTEF) 5 mg tablet Take 5 mg by mouth nightly. ESTRADIOL PO Take  by mouth daily. B.infantis-B.ani-B.long-B.bifi (PROBIOTIC 4X) 10-15 mg TbEC Take 1 Tab by mouth every morning. furosemide (LASIX) 40 mg tablet Take 40 mg by mouth daily. Potassium Citrate 15 mEq TbER Take 15 mEq by mouth two (2) times a day. lipase-protease-amylase (ZENPEP) capsule Take 1 Cap by mouth Before breakfast, lunch, dinner and at bedtime. calcium citrate 200 mg (950 mg) tablet Take 200 mg by mouth three (3) times daily. oxyCODONE CR (OXYCONTIN) 40 mg CR tablet Take 40 mg by mouth every twelve (12) hours.  Indications: CHRONIC PAIN      omeprazole (PRILOSEC) 20 mg capsule Take 20 mg by mouth two (2) times a day. HYDROmorphone (DILAUDID) 4 mg tablet Take  by mouth every three (3) hours as needed for Pain.      montelukast (SINGULAIR) 10 mg tablet Take 10 mg by mouth every morning. albuterol (PROVENTIL, VENTOLIN) 90 mcg/Actuation inhaler Take 2 Puffs by inhalation as needed. Epinephrine 0.15 mg/0.15 mL PnIj by IntraMUSCular route as needed. Epi-pen as needed for allergic reaction      ZOFRAN 8 mg Tab Take 8 mg by mouth as needed. !! - Potential duplicate medications found. Please discuss with provider. Disposition: home  Activity: As tolerated  Diet: GI soft diet    Follow-up Appointments   Procedures    FOLLOW UP VISIT Appointment in: One Week F/u with pcp in a week with cbc, bmp and magnesium. Keep appointment with Wound care tomorrow. F/u with pcp in a week with cbc, bmp and magnesium. Keep appointment with Wound care tomorrow. Standing Status:   Standing     Number of Occurrences:   1     Order Specific Question:   Appointment in     Answer: One Week         Follow-up Information     Follow up With Specialties Details Why Keiry Flores MD Internal Medicine   [phone] [facility]  [phone]            Time spent in patient discharge planning and coordination 25 minutes.     Signed:  Leslie Fischer MD

## 2021-03-17 ENCOUNTER — HOSPITAL ENCOUNTER (OUTPATIENT)
Age: 41
Setting detail: OBSERVATION
Discharge: HOME HEALTH | End: 2021-03-19
Attending: EMERGENCY MEDICINE | Admitting: INTERNAL MEDICINE
Payer: COMMERCIAL

## 2021-03-17 DIAGNOSIS — E86.0 DEHYDRATION: Primary | ICD-10-CM

## 2021-03-17 DIAGNOSIS — K86.1 CHRONIC PANCREATITIS, UNSPECIFIED PANCREATITIS TYPE (HCC): ICD-10-CM

## 2021-03-17 PROBLEM — K85.90 PANCREATITIS: Status: RESOLVED | Noted: 2020-08-14 | Resolved: 2021-03-17

## 2021-03-17 PROBLEM — K85.90 ACUTE PANCREATITIS: Status: RESOLVED | Noted: 2020-08-15 | Resolved: 2021-03-17

## 2021-03-17 PROBLEM — R10.9 ABDOMINAL PAIN: Status: RESOLVED | Noted: 2020-08-14 | Resolved: 2021-03-17

## 2021-03-17 PROBLEM — E87.6 HYPOKALEMIA: Status: RESOLVED | Noted: 2021-01-13 | Resolved: 2021-03-17

## 2021-03-17 PROBLEM — N17.9 AKI (ACUTE KIDNEY INJURY) (HCC): Status: ACTIVE | Noted: 2021-03-17

## 2021-03-17 LAB
ALBUMIN SERPL-MCNC: 2.8 G/DL (ref 3.5–5)
ALBUMIN/GLOB SERPL: 0.8 {RATIO} (ref 1.2–3.5)
ALP SERPL-CCNC: 71 U/L (ref 50–136)
ALT SERPL-CCNC: 21 U/L (ref 12–65)
ANION GAP SERPL CALC-SCNC: 8 MMOL/L (ref 7–16)
AST SERPL-CCNC: 14 U/L (ref 15–37)
BASOPHILS # BLD: 0 K/UL (ref 0–0.2)
BASOPHILS NFR BLD: 0 % (ref 0–2)
BILIRUB SERPL-MCNC: 0.3 MG/DL (ref 0.2–1.1)
BUN SERPL-MCNC: 10 MG/DL (ref 6–23)
CALCIUM SERPL-MCNC: 8.8 MG/DL (ref 8.3–10.4)
CHLORIDE SERPL-SCNC: 100 MMOL/L (ref 98–107)
CO2 SERPL-SCNC: 31 MMOL/L (ref 21–32)
CREAT SERPL-MCNC: 1.19 MG/DL (ref 0.6–1)
DIFFERENTIAL METHOD BLD: ABNORMAL
EOSINOPHIL # BLD: 0 K/UL (ref 0–0.8)
EOSINOPHIL NFR BLD: 0 % (ref 0.5–7.8)
ERYTHROCYTE [DISTWIDTH] IN BLOOD BY AUTOMATED COUNT: 15 % (ref 11.9–14.6)
GLOBULIN SER CALC-MCNC: 3.7 G/DL (ref 2.3–3.5)
GLUCOSE SERPL-MCNC: 137 MG/DL (ref 65–100)
HCT VFR BLD AUTO: 35.5 % (ref 35.8–46.3)
HGB BLD-MCNC: 11 G/DL (ref 11.7–15.4)
IMM GRANULOCYTES # BLD AUTO: 0.1 K/UL (ref 0–0.5)
IMM GRANULOCYTES NFR BLD AUTO: 1 % (ref 0–5)
LIPASE SERPL-CCNC: 37 U/L (ref 73–393)
LYMPHOCYTES # BLD: 1 K/UL (ref 0.5–4.6)
LYMPHOCYTES NFR BLD: 7 % (ref 13–44)
MCH RBC QN AUTO: 27.4 PG (ref 26.1–32.9)
MCHC RBC AUTO-ENTMCNC: 31 G/DL (ref 31.4–35)
MCV RBC AUTO: 88.5 FL (ref 79.6–97.8)
MONOCYTES # BLD: 0.1 K/UL (ref 0.1–1.3)
MONOCYTES NFR BLD: 1 % (ref 4–12)
NEUTS SEG # BLD: 13.3 K/UL (ref 1.7–8.2)
NEUTS SEG NFR BLD: 92 % (ref 43–78)
NRBC # BLD: 0 K/UL (ref 0–0.2)
PLATELET # BLD AUTO: 338 K/UL (ref 150–450)
PMV BLD AUTO: 9.2 FL (ref 9.4–12.3)
POTASSIUM SERPL-SCNC: 4.1 MMOL/L (ref 3.5–5.1)
PROT SERPL-MCNC: 6.5 G/DL (ref 6.3–8.2)
RBC # BLD AUTO: 4.01 M/UL (ref 4.05–5.2)
SODIUM SERPL-SCNC: 139 MMOL/L (ref 136–145)
WBC # BLD AUTO: 14.4 K/UL (ref 4.3–11.1)

## 2021-03-17 PROCEDURE — 96374 THER/PROPH/DIAG INJ IV PUSH: CPT

## 2021-03-17 PROCEDURE — 83690 ASSAY OF LIPASE: CPT

## 2021-03-17 PROCEDURE — 96376 TX/PRO/DX INJ SAME DRUG ADON: CPT

## 2021-03-17 PROCEDURE — 74011250636 HC RX REV CODE- 250/636: Performed by: FAMILY MEDICINE

## 2021-03-17 PROCEDURE — 99218 HC RM OBSERVATION: CPT

## 2021-03-17 PROCEDURE — 65270000029 HC RM PRIVATE

## 2021-03-17 PROCEDURE — 99283 EMERGENCY DEPT VISIT LOW MDM: CPT

## 2021-03-17 PROCEDURE — 96361 HYDRATE IV INFUSION ADD-ON: CPT

## 2021-03-17 PROCEDURE — 96375 TX/PRO/DX INJ NEW DRUG ADDON: CPT

## 2021-03-17 PROCEDURE — 85025 COMPLETE CBC W/AUTO DIFF WBC: CPT

## 2021-03-17 PROCEDURE — 74011250636 HC RX REV CODE- 250/636: Performed by: PHYSICIAN ASSISTANT

## 2021-03-17 PROCEDURE — 80053 COMPREHEN METABOLIC PANEL: CPT

## 2021-03-17 RX ORDER — SODIUM CHLORIDE 9 MG/ML
1000 INJECTION, SOLUTION INTRAVENOUS ONCE
Status: COMPLETED | OUTPATIENT
Start: 2021-03-17 | End: 2021-03-17

## 2021-03-17 RX ORDER — SODIUM CHLORIDE 0.9 % (FLUSH) 0.9 %
5-40 SYRINGE (ML) INJECTION EVERY 8 HOURS
Status: DISCONTINUED | OUTPATIENT
Start: 2021-03-17 | End: 2021-03-19 | Stop reason: HOSPADM

## 2021-03-17 RX ORDER — HYDROMORPHONE HYDROCHLORIDE 1 MG/ML
1 INJECTION, SOLUTION INTRAMUSCULAR; INTRAVENOUS; SUBCUTANEOUS
Status: COMPLETED | OUTPATIENT
Start: 2021-03-17 | End: 2021-03-17

## 2021-03-17 RX ORDER — ENOXAPARIN SODIUM 100 MG/ML
40 INJECTION SUBCUTANEOUS DAILY
Status: DISCONTINUED | OUTPATIENT
Start: 2021-03-18 | End: 2021-03-19 | Stop reason: HOSPADM

## 2021-03-17 RX ORDER — ALBUTEROL SULFATE 2.5 MG/.5ML
2.5 SOLUTION RESPIRATORY (INHALATION)
Status: DISCONTINUED | OUTPATIENT
Start: 2021-03-17 | End: 2021-03-19 | Stop reason: HOSPADM

## 2021-03-17 RX ORDER — MONTELUKAST SODIUM 10 MG/1
10 TABLET ORAL
Status: DISCONTINUED | OUTPATIENT
Start: 2021-03-18 | End: 2021-03-19 | Stop reason: HOSPADM

## 2021-03-17 RX ORDER — ONDANSETRON 2 MG/ML
4 INJECTION INTRAMUSCULAR; INTRAVENOUS
Status: DISCONTINUED | OUTPATIENT
Start: 2021-03-17 | End: 2021-03-19 | Stop reason: HOSPADM

## 2021-03-17 RX ORDER — PANTOPRAZOLE SODIUM 40 MG/1
40 TABLET, DELAYED RELEASE ORAL
Status: DISCONTINUED | OUTPATIENT
Start: 2021-03-18 | End: 2021-03-19 | Stop reason: HOSPADM

## 2021-03-17 RX ORDER — HYDROCORTISONE 5 MG/1
10 TABLET ORAL
Status: DISCONTINUED | OUTPATIENT
Start: 2021-03-18 | End: 2021-03-18

## 2021-03-17 RX ORDER — ACETAMINOPHEN 325 MG/1
650 TABLET ORAL
Status: DISCONTINUED | OUTPATIENT
Start: 2021-03-17 | End: 2021-03-19 | Stop reason: HOSPADM

## 2021-03-17 RX ORDER — SODIUM CHLORIDE 0.9 % (FLUSH) 0.9 %
5-40 SYRINGE (ML) INJECTION AS NEEDED
Status: DISCONTINUED | OUTPATIENT
Start: 2021-03-17 | End: 2021-03-19 | Stop reason: HOSPADM

## 2021-03-17 RX ORDER — CALCIUM CARBONATE 500(1250)
500 TABLET ORAL
Status: DISCONTINUED | OUTPATIENT
Start: 2021-03-18 | End: 2021-03-19 | Stop reason: HOSPADM

## 2021-03-17 RX ORDER — OXYCODONE HCL 10 MG/1
30 TABLET, FILM COATED, EXTENDED RELEASE ORAL EVERY 12 HOURS
Status: DISCONTINUED | OUTPATIENT
Start: 2021-03-17 | End: 2021-03-19 | Stop reason: HOSPADM

## 2021-03-17 RX ORDER — SODIUM CHLORIDE 9 MG/ML
100 INJECTION, SOLUTION INTRAVENOUS CONTINUOUS
Status: DISCONTINUED | OUTPATIENT
Start: 2021-03-17 | End: 2021-03-18

## 2021-03-17 RX ORDER — ONDANSETRON 2 MG/ML
4 INJECTION INTRAMUSCULAR; INTRAVENOUS
Status: COMPLETED | OUTPATIENT
Start: 2021-03-17 | End: 2021-03-17

## 2021-03-17 RX ORDER — HYDROCORTISONE SODIUM SUCCINATE 100 MG/2ML
100 INJECTION, POWDER, FOR SOLUTION INTRAMUSCULAR; INTRAVENOUS
Status: COMPLETED | OUTPATIENT
Start: 2021-03-17 | End: 2021-03-17

## 2021-03-17 RX ORDER — HYDROMORPHONE HYDROCHLORIDE 1 MG/ML
1 INJECTION, SOLUTION INTRAMUSCULAR; INTRAVENOUS; SUBCUTANEOUS
Status: DISCONTINUED | OUTPATIENT
Start: 2021-03-17 | End: 2021-03-18

## 2021-03-17 RX ORDER — UREA 10 %
2500 LOTION (ML) TOPICAL DAILY
COMMUNITY

## 2021-03-17 RX ORDER — ACETAMINOPHEN 650 MG/1
650 SUPPOSITORY RECTAL
Status: DISCONTINUED | OUTPATIENT
Start: 2021-03-17 | End: 2021-03-19 | Stop reason: HOSPADM

## 2021-03-17 RX ORDER — HYDROCORTISONE 5 MG/1
5 TABLET ORAL
Status: DISCONTINUED | OUTPATIENT
Start: 2021-03-17 | End: 2021-03-19 | Stop reason: HOSPADM

## 2021-03-17 RX ORDER — FUROSEMIDE 40 MG/1
40 TABLET ORAL DAILY
Status: DISCONTINUED | OUTPATIENT
Start: 2021-03-18 | End: 2021-03-19 | Stop reason: HOSPADM

## 2021-03-17 RX ADMIN — SODIUM CHLORIDE 100 ML/HR: 900 INJECTION, SOLUTION INTRAVENOUS at 23:07

## 2021-03-17 RX ADMIN — SODIUM CHLORIDE 1000 ML: 900 INJECTION, SOLUTION INTRAVENOUS at 21:26

## 2021-03-17 RX ADMIN — HYDROMORPHONE HYDROCHLORIDE 1 MG: 1 INJECTION, SOLUTION INTRAMUSCULAR; INTRAVENOUS; SUBCUTANEOUS at 23:06

## 2021-03-17 RX ADMIN — HYDROMORPHONE HYDROCHLORIDE 1 MG: 1 INJECTION, SOLUTION INTRAMUSCULAR; INTRAVENOUS; SUBCUTANEOUS at 21:28

## 2021-03-17 RX ADMIN — ONDANSETRON 4 MG: 2 INJECTION INTRAMUSCULAR; INTRAVENOUS at 23:06

## 2021-03-17 RX ADMIN — HYDROCORTISONE SODIUM SUCCINATE 100 MG: 100 INJECTION, POWDER, FOR SOLUTION INTRAMUSCULAR; INTRAVENOUS at 21:26

## 2021-03-17 RX ADMIN — Medication 10 ML: at 23:00

## 2021-03-17 RX ADMIN — ONDANSETRON 4 MG: 2 INJECTION INTRAMUSCULAR; INTRAVENOUS at 21:28

## 2021-03-17 NOTE — ED NOTES
Paramedic Nuris Chun attempted to establish IV and start lab work. Patient refuses to have IV started and then go back to lobby, patient also refuses sanchez bed placement. Paramedic Nuris Chun attempted to explain to patient that this would help have some lab results when placed in room. Patient cussing at Paramedic and continues to refuse, placed back in lobby.

## 2021-03-17 NOTE — ED TRIAGE NOTES
Pt states she is having an adrenal crisis and pancreatitis flare. C/o abdominal pain with N/V and L leg pain. States she just had a skin graft done to L foot this morning around 8am [facility] and \"they refused to admit me. \"

## 2021-03-18 LAB
ANION GAP SERPL CALC-SCNC: 6 MMOL/L (ref 7–16)
BASOPHILS # BLD: 0 K/UL (ref 0–0.2)
BASOPHILS NFR BLD: 0 % (ref 0–2)
BUN SERPL-MCNC: 11 MG/DL (ref 6–23)
CALCIUM SERPL-MCNC: 8.1 MG/DL (ref 8.3–10.4)
CHLORIDE SERPL-SCNC: 106 MMOL/L (ref 98–107)
CO2 SERPL-SCNC: 30 MMOL/L (ref 21–32)
CREAT SERPL-MCNC: 1.1 MG/DL (ref 0.6–1)
DIFFERENTIAL METHOD BLD: ABNORMAL
EOSINOPHIL # BLD: 0 K/UL (ref 0–0.8)
EOSINOPHIL NFR BLD: 0 % (ref 0.5–7.8)
ERYTHROCYTE [DISTWIDTH] IN BLOOD BY AUTOMATED COUNT: 15.2 % (ref 11.9–14.6)
GLUCOSE SERPL-MCNC: 134 MG/DL (ref 65–100)
HCT VFR BLD AUTO: 33.2 % (ref 35.8–46.3)
HGB BLD-MCNC: 9.9 G/DL (ref 11.7–15.4)
IMM GRANULOCYTES # BLD AUTO: 0.1 K/UL (ref 0–0.5)
IMM GRANULOCYTES NFR BLD AUTO: 1 % (ref 0–5)
LYMPHOCYTES # BLD: 1.6 K/UL (ref 0.5–4.6)
LYMPHOCYTES NFR BLD: 12 % (ref 13–44)
MAGNESIUM SERPL-MCNC: 1.8 MG/DL (ref 1.8–2.4)
MCH RBC QN AUTO: 26.9 PG (ref 26.1–32.9)
MCHC RBC AUTO-ENTMCNC: 29.8 G/DL (ref 31.4–35)
MCV RBC AUTO: 90.2 FL (ref 79.6–97.8)
MONOCYTES # BLD: 0.5 K/UL (ref 0.1–1.3)
MONOCYTES NFR BLD: 4 % (ref 4–12)
NEUTS SEG # BLD: 11.7 K/UL (ref 1.7–8.2)
NEUTS SEG NFR BLD: 84 % (ref 43–78)
NRBC # BLD: 0 K/UL (ref 0–0.2)
PLATELET # BLD AUTO: 323 K/UL (ref 150–450)
PMV BLD AUTO: 9.4 FL (ref 9.4–12.3)
POTASSIUM SERPL-SCNC: 4.1 MMOL/L (ref 3.5–5.1)
RBC # BLD AUTO: 3.68 M/UL (ref 4.05–5.2)
SODIUM SERPL-SCNC: 142 MMOL/L (ref 136–145)
WBC # BLD AUTO: 13.9 K/UL (ref 4.3–11.1)

## 2021-03-18 PROCEDURE — 96372 THER/PROPH/DIAG INJ SC/IM: CPT

## 2021-03-18 PROCEDURE — 36415 COLL VENOUS BLD VENIPUNCTURE: CPT

## 2021-03-18 PROCEDURE — 74011250636 HC RX REV CODE- 250/636: Performed by: INTERNAL MEDICINE

## 2021-03-18 PROCEDURE — 83735 ASSAY OF MAGNESIUM: CPT

## 2021-03-18 PROCEDURE — 74011250637 HC RX REV CODE- 250/637: Performed by: INTERNAL MEDICINE

## 2021-03-18 PROCEDURE — 99218 HC RM OBSERVATION: CPT

## 2021-03-18 PROCEDURE — 96376 TX/PRO/DX INJ SAME DRUG ADON: CPT

## 2021-03-18 PROCEDURE — 74011250637 HC RX REV CODE- 250/637: Performed by: FAMILY MEDICINE

## 2021-03-18 PROCEDURE — 85025 COMPLETE CBC W/AUTO DIFF WBC: CPT

## 2021-03-18 PROCEDURE — 80048 BASIC METABOLIC PNL TOTAL CA: CPT

## 2021-03-18 PROCEDURE — 96361 HYDRATE IV INFUSION ADD-ON: CPT

## 2021-03-18 PROCEDURE — 74011250636 HC RX REV CODE- 250/636: Performed by: FAMILY MEDICINE

## 2021-03-18 RX ORDER — HYDROCORTISONE 10 MG/1
10 TABLET ORAL 3 TIMES DAILY
Status: DISCONTINUED | OUTPATIENT
Start: 2021-03-18 | End: 2021-03-19 | Stop reason: HOSPADM

## 2021-03-18 RX ORDER — HYDROMORPHONE HYDROCHLORIDE 1 MG/ML
1 INJECTION, SOLUTION INTRAMUSCULAR; INTRAVENOUS; SUBCUTANEOUS ONCE
Status: COMPLETED | OUTPATIENT
Start: 2021-03-18 | End: 2021-03-18

## 2021-03-18 RX ORDER — OXYCODONE HYDROCHLORIDE 5 MG/1
5 TABLET ORAL
Status: DISCONTINUED | OUTPATIENT
Start: 2021-03-18 | End: 2021-03-19 | Stop reason: HOSPADM

## 2021-03-18 RX ORDER — LABETALOL HYDROCHLORIDE 5 MG/ML
10 INJECTION, SOLUTION INTRAVENOUS
Status: DISCONTINUED | OUTPATIENT
Start: 2021-03-18 | End: 2021-03-19 | Stop reason: HOSPADM

## 2021-03-18 RX ORDER — POTASSIUM CHLORIDE 750 MG/1
10 TABLET, EXTENDED RELEASE ORAL DAILY
Status: DISCONTINUED | OUTPATIENT
Start: 2021-03-18 | End: 2021-03-19 | Stop reason: HOSPADM

## 2021-03-18 RX ORDER — HYDROMORPHONE HYDROCHLORIDE 1 MG/ML
0.5 INJECTION, SOLUTION INTRAMUSCULAR; INTRAVENOUS; SUBCUTANEOUS
Status: COMPLETED | OUTPATIENT
Start: 2021-03-18 | End: 2021-03-18

## 2021-03-18 RX ORDER — SODIUM CHLORIDE, SODIUM LACTATE, POTASSIUM CHLORIDE, CALCIUM CHLORIDE 600; 310; 30; 20 MG/100ML; MG/100ML; MG/100ML; MG/100ML
100 INJECTION, SOLUTION INTRAVENOUS CONTINUOUS
Status: ACTIVE | OUTPATIENT
Start: 2021-03-18 | End: 2021-03-18

## 2021-03-18 RX ADMIN — POTASSIUM CHLORIDE 10 MEQ: 750 TABLET, EXTENDED RELEASE ORAL at 15:07

## 2021-03-18 RX ADMIN — HYDROMORPHONE HYDROCHLORIDE 1 MG: 1 INJECTION, SOLUTION INTRAMUSCULAR; INTRAVENOUS; SUBCUTANEOUS at 08:20

## 2021-03-18 RX ADMIN — ENOXAPARIN SODIUM 40 MG: 40 INJECTION, SOLUTION INTRAVENOUS; SUBCUTANEOUS at 08:00

## 2021-03-18 RX ADMIN — HYDROCORTISONE 10 MG: 10 TABLET ORAL at 21:43

## 2021-03-18 RX ADMIN — CALCIUM 500 MG: 500 TABLET ORAL at 15:07

## 2021-03-18 RX ADMIN — HYDROMORPHONE HYDROCHLORIDE 1 MG: 1 INJECTION, SOLUTION INTRAMUSCULAR; INTRAVENOUS; SUBCUTANEOUS at 05:11

## 2021-03-18 RX ADMIN — HYDROMORPHONE HYDROCHLORIDE 1 MG: 1 INJECTION, SOLUTION INTRAMUSCULAR; INTRAVENOUS; SUBCUTANEOUS at 20:35

## 2021-03-18 RX ADMIN — Medication 10 ML: at 14:00

## 2021-03-18 RX ADMIN — ONDANSETRON 4 MG: 2 INJECTION INTRAMUSCULAR; INTRAVENOUS at 09:13

## 2021-03-18 RX ADMIN — OXYCODONE 5 MG: 5 TABLET ORAL at 15:07

## 2021-03-18 RX ADMIN — ONDANSETRON 4 MG: 2 INJECTION INTRAMUSCULAR; INTRAVENOUS at 05:11

## 2021-03-18 RX ADMIN — CALCIUM 500 MG: 500 TABLET ORAL at 08:01

## 2021-03-18 RX ADMIN — HYDROMORPHONE HYDROCHLORIDE 0.5 MG: 1 INJECTION, SOLUTION INTRAMUSCULAR; INTRAVENOUS; SUBCUTANEOUS at 12:27

## 2021-03-18 RX ADMIN — HYDROCORTISONE 10 MG: 10 TABLET ORAL at 15:08

## 2021-03-18 RX ADMIN — HYDROMORPHONE HYDROCHLORIDE 1 MG: 1 INJECTION, SOLUTION INTRAMUSCULAR; INTRAVENOUS; SUBCUTANEOUS at 02:06

## 2021-03-18 RX ADMIN — HYDROCORTISONE 10 MG: 5 TABLET ORAL at 08:00

## 2021-03-18 RX ADMIN — PANTOPRAZOLE SODIUM 40 MG: 40 TABLET, DELAYED RELEASE ORAL at 08:00

## 2021-03-18 RX ADMIN — SODIUM CHLORIDE, SODIUM LACTATE, POTASSIUM CHLORIDE, AND CALCIUM CHLORIDE 100 ML/HR: 600; 310; 30; 20 INJECTION, SOLUTION INTRAVENOUS at 12:27

## 2021-03-18 RX ADMIN — MONTELUKAST SODIUM 10 MG: 10 TABLET, COATED ORAL at 08:01

## 2021-03-18 RX ADMIN — OXYCODONE HYDROCHLORIDE 30 MG: 10 TABLET, FILM COATED, EXTENDED RELEASE ORAL at 21:43

## 2021-03-18 RX ADMIN — OXYCODONE HYDROCHLORIDE 30 MG: 10 TABLET, FILM COATED, EXTENDED RELEASE ORAL at 09:13

## 2021-03-18 RX ADMIN — Medication 5 ML: at 05:11

## 2021-03-18 RX ADMIN — Medication 5 ML: at 20:37

## 2021-03-18 NOTE — CONSULTS
[facility] Consult Note       Primary GI Physician: Dr Alejandro Small    Referring Provider:  Dr Endy Gramajo    Consult Date:  3/18/2021    Admit Date:  3/17/2021    Chief Complaint:  Pancreatitis, nausea, vomiting    Subjective:     History of Present Illness:  Patient is a 38 y.o. female with PMH of including but not limited to [place] disease, chronic pancreatitis, chronic pain, and recent LLE skin graft, who is seen in consultation at the request of Dr. Endy Gramajo for pancreatitis, nausea, and vomiting. Pt presented to ED for nausea and vomiting. She was admitted for elevated creatinine, nausea, vomiting, and chronic pancreatitis. Referring provider notes that pt requested IV Dilaudid and IV steroid as soon as entered room. Pt also requested gastroenterology consult has she has chronic pancreatitis. Lipase 37 at admission and LFTs WNL. No nausea or vomiting since 3/17/2021. She is tolerating water. Primary team has stopped IV Dilaudid. She is being treated with home pain regimen with addition of oxycodone. Now on clear liquids. Zenpep added per primary team.       Pt previously known to Dr Alejandro Small. She was being followed for chronic pancreatitis, gastroparesis, IBS, Review of records showed pt had EUS in [place] [id number] that found evidence of chronic pancreatitis. After that, pt has EGD, MRCP, GES that was normal. Dr Alejandro Small felt symptoms more IBS than anything as chronic pancreatitis diagnosis questionable. She has not been seen in office since 2017. She was discharged from our practice 11/2017. She was referred to [address] for second opinion. She has been followed by [address] since that time. EUS with [facility] 2018 revealed hyperechoic gland but no changes of chronic pancreatitis. MRCP 2020 with [facility] with no morphological evidence of pancreatitis. Had televisit 12/17/2020 with Dr Donato Sewell.  Dr Donato Sewell discussed no clear morphological or clinical evidence of chronic pancreatitis and did not feel endoscopic pancreatic function testing justified. She lists bloating as her principle complaint. Alternating D and C. Uses narcotics at home for stomach pain and joint pain. 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 PMH:  Past Medical History:   Diagnosis Date    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    Arthritis     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    Asthma     uses MDI as needed - rarely-pt can't remember last time needed    Avascular necrosis of femur head     right hip, left hip replacements    Chronic pain     Endocrine disease diagnosed 2003    ADDISONS DISEASE    Endometriosis     hysterectomy    GERD (gastroesophageal reflux disease)     Infectious disease 2007    MRSA-2007, community acquired - in hospital [facility]) about 6 months - 3 months of which was in coma;  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     Nausea & vomiting     Other ill-defined conditions(799.89)     allergies    Pancreatitis chronic     7 episodes    Unspecified adverse effect of anesthesia     pt reports \"difficult to sedate       PSH:  Past Surgical History:   Procedure Laterality Date    HX APPENDECTOMY      HX CHOLECYSTECTOMY      HX GYN      hysterectomy    HX ORTHOPAEDIC  2010    right EDIN    HX ORTHOPAEDIC  2010    Left  EDIN    HX OTHER SURGICAL  2007    I&D of 9 abcesses -MRSA    HX SHOULDER REPLACEMENT  12/2011    Right    HX SHOULDER REPLACEMENT  2015    left    HX TONSILLECTOMY         Allergies:   Allergies   Allergen Reactions    Latex Anaphylaxis    Moore Oil Anaphylaxis    [place] Nut Anaphylaxis    Peanut Anaphylaxis    Shellfish Containing Products Anaphylaxis    Beef Containing Products Other (comments)     Per allergy testing    Chicken Derived Other (comments)     Per allergy testing    Chlorpromazine Other (comments)     Dystonic      Clindamycin Rash    Dopamine Receptor Agonist Other (comments)     Dystonic      Gluten Other (comments)     Per allergy testing    Hydroxyzine Pamoate Other (comments)     DYSTONIC      Ibuprofen Rash and Photosensitivity    Iodine Rash    Naproxen Sodium Rash    Nubain [Nalbuphine] Rash    Other Medication Other (comments)     Bananas, avacodo, strawberry, potatoes  --- ALL PER ALLERGY TESTING    Pcn [Penicillins] Rash    Povidone-Iodine Rash    Prochlorperazine Edisylate Other (comments)     Dystonic      Promethazine Other (comments)     Dystonic      Reglan [Metoclopramide] Anaphylaxis     dystonia    Soy Other (comments)     Per allergy testing    Sulfa (Sulfonamide Antibiotics) Rash    Toradol [Ketorolac Tromethamine] Rash    Tramadol Rash    Zithromax [Azithromycin] Rash       Home Medications:  Prior to Admission medications    Medication Sig Start Date End Date Taking? Authorizing Provider   cyanocobalamin (VITAMIN B12) 100 mcg tablet Take 2,500 mcg by mouth daily. Yes Provider, Historical   hydrocortisone (CORTEF) 10 mg tablet Take 10 mg by mouth every morning. Yes Provider, Historical   hydrocortisone (CORTEF) 5 mg tablet Take 5 mg by mouth nightly. Yes Provider, Historical   ESTRADIOL PO Take  by mouth daily. Yes Provider, Historical   B.infantis-B.ani-B.long-B.bifi (PROBIOTIC 4X) 10-15 mg TbEC Take 1 Tab by mouth every morning. Yes Provider, Historical   furosemide (LASIX) 40 mg tablet Take 40 mg by mouth daily. Yes Provider, Historical   Potassium Citrate 15 mEq TbER Take 15 mEq by mouth two (2) times a day. Yes Other, MD Laura   lipase-protease-amylase (ZENPEP) capsule Take 1 Cap by mouth Before breakfast, lunch, dinner and at bedtime. Yes Other, MD Laura   calcium citrate 200 mg (950 mg) tablet Take 200 mg by mouth three (3) times daily. Yes Other, MD Laura   oxyCODONE CR (OXYCONTIN) 40 mg CR tablet Take 40 mg by mouth every twelve (12) hours.  Indications: CHRONIC PAIN   Yes Provider, Historical   omeprazole (PRILOSEC) 20 mg capsule Take 20 mg by mouth two (2) times a day. Yes Provider, Historical   HYDROmorphone (DILAUDID) 4 mg tablet Take  by mouth every three (3) hours as needed for Pain. Yes Other, MD Laura   montelukast (SINGULAIR) 10 mg tablet Take 10 mg by mouth every morning. Yes Other, MD Laura   albuterol (PROVENTIL, VENTOLIN) 90 mcg/Actuation inhaler Take 2 Puffs by inhalation as needed. Yes Izzy, MD Laura   ZOFRAN 8 mg Tab Take 8 mg by mouth as needed. Yes Other, MD Laura   Epinephrine 0.15 mg/0.15 mL PnIj by IntraMUSCular route as needed. Epi-pen as needed for allergic reaction    Other, MD Laura       Hospital Medications:  Current Facility-Administered Medications   Medication Dose Route Frequency    HYDROmorphone (DILAUDID) injection 0.5 mg  0.5 mg IntraVENous ONCE PRN    lipase-protease-amylase (ZENPEP 5,000) capsule 1 Cap  1 Cap Oral TID WITH MEALS    hydrocortisone (CORTEF) tablet 10 mg  10 mg Oral TID    lactated Ringers infusion  100 mL/hr IntraVENous CONTINUOUS    oxyCODONE IR (ROXICODONE) tablet 5 mg  5 mg Oral Q8H PRN    labetaloL (NORMODYNE;TRANDATE) injection 10 mg  10 mg IntraVENous Q4H PRN    potassium chloride (KLOR-CON) tablet 10 mEq  10 mEq Oral DAILY    albuterol CONCENTRATE 2.5mg/0.5 mL neb soln  2.5 mg Inhalation Q4H PRN    calcium carbonate (OS-KEITH) tablet 500 mg [elemental]  500 mg Oral TID WITH MEALS    [Held by provider] furosemide (LASIX) tablet 40 mg  40 mg Oral DAILY    [Held by provider] hydrocortisone (CORTEF) tablet 5 mg  5 mg Oral QHS    . PHARMACY TO SUBSTITUTE PER PROTOCOL (Reordered from: lipase-protease-amylase (ZENPEP) capsule)    Per Protocol    montelukast (SINGULAIR) tablet 10 mg  10 mg Oral 7am    pantoprazole (PROTONIX) tablet 40 mg  40 mg Oral ACB    oxyCODONE ER (OxyCONTIN) tablet 30 mg  30 mg Oral Q12H    sodium chloride (NS) flush 5-40 mL  5-40 mL IntraVENous Q8H    sodium chloride (NS) flush 5-40 mL  5-40 mL IntraVENous PRN    acetaminophen (TYLENOL) tablet 650 mg  650 mg Oral Q6H PRN    Or    acetaminophen (TYLENOL) suppository 650 mg  650 mg Rectal Q6H PRN    enoxaparin (LOVENOX) injection 40 mg  40 mg SubCUTAneous DAILY    ondansetron (ZOFRAN) injection 4 mg  4 mg IntraVENous Q4H PRN       Social History:  Social History     Tobacco Use    Smoking status: Never Smoker    Smokeless tobacco: Never Used   Substance Use Topics    Alcohol use: No       Pt denies any history of drug use, blood transfusions, or tattoos. Family History:  Family History   Problem Relation Age of Onset    Cancer Mother         THROID CANCER    Diabetes Maternal Grandmother     Stroke Maternal Grandfather     Heart Disease Paternal Grandmother        Review of Systems:  A detailed 10 system ROS is obtained, with pertinent positives as listed above. All others are negative. Diet:      Objective:     Physical Exam:  Vitals:  Visit Vitals  BP (!) 155/86 (BP 1 Location: Right upper arm)   Pulse 88   Temp 98.1 °F (36.7 °C)   Resp 16   Ht 5' 4\" (1.626 m)   Wt 90.6 kg (199 lb 12.8 oz)   SpO2 96%   BMI 34.30 kg/m²     Gen:  Pt is alert, cooperative, no acute distress  Skin:  Extremities and face reveal no rashes. HEENT: Sclerae anicteric. Extra-occular muscles are intact. No oral ulcers. No abnormal pigmentation of the lips. The neck is supple. Cardiovascular: Regular rate and rhythm. No murmurs, gallops, or rubs. Respiratory:  Comfortable breathing with no accessory muscle use. Clear breath sounds anteriorly with no wheezes, rales, or rhonchi. GI:  Abdomen nondistended, soft, and nontender. Normal active bowel sounds. No enlargement of the liver or spleen. No masses palpable. Rectal:  Deferred  Musculoskeletal:  No pitting edema of the lower legs. Neurological:  Gross memory appears intact. Patient is alert and oriented. Psychiatric:  Mood appears appropriate with judgement intact. Lymphatic:  No cervical or supraclavicular adenopathy.     Laboratory:    Recent Labs 03/18/21  0514 03/17/21 1958   WBC 13.9* 14.4*   HGB 9.9* 11.0*   HCT 33.2* 35.5*    338   MCV 90.2 88.5    139   K 4.1 4.1    100   CO2 30 31   BUN 11 10   CREA 1.10* 1.19*   CA 8.1* 8.8   MG 1.8  --    * 137*   AP  --  71   ALT  --  21   TBILI  --  0.3   ALB  --  2.8*   TP  --  6.5   LPSE  --  37*          Assessment:     Principal Problem:    [facility] (3/17/2021)    Active Problems:    [place]'s disease ([address] (12/10/2011)      Chronic abdominal pain (5/22/2020)      Nausea & vomiting (1/12/2021)    Patient is a 38 y.o. 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 Ivy Wylie for pancreatitis, nausea, and vomiting. Pt has not had any nausea or vomiting since admission. Lipase and LFTs WNL. She has repeatedly asked for IV pain medications. Prior evaluation in our office in 2017 with no definite diagnosis of chronic pancreatitis. She was referred to [address]. Of note, she was discharged from our practice in 2017. Plan:     -Supportive care-IVF, antiemetics, analgesics as per primary team  -Continue pantoprazole 40mg daily  -Continue Beverlyn Shall  -Pt will need to follow up with her GI providers at [address] or establish care with another GI practice for outpatient follow up  -GI to sign off  ATTENDING NOTE:  I have seen and examined the patient along with my NP/PA. The assessment and plan above is my own. Unclear if she has chronic pancreatitis. One EUS postulated this but subsequent tests, imaging and trials of Carter-Pep suggent otherwise     Has E[facility] in [facility] for 4/1 already scheduled with her primary GI   I would minimize narcotics, no IV narcotis in hospital   Try Xifaxan 550mg TID for IBS with bloating   F/u with primary GI and perhaps encourage her to check with him prior to futuer ED visits.     I will sign off;  Nothing further to offer.  MD Anastacia Dean MD

## 2021-03-18 NOTE — ED PROVIDER NOTES
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 She states she was supposed to have a loading dose of Solu-Cortef 125 mg IV prior to the procedure and they only gave her 48. She states that the last time this happened she went into [facility]. She also has a history of chronic pancreatitis and thinks this may have flared it up as she has been having some nausea and vomiting over the last 2 days. Today she went home after the procedure and has been vomiting all day. She drove to her mother's house who brought her here for admission. She feels extremely dehydrated, is having a lot of abdominal pain and is not feeling well. No chest pain or shortness of breath. The history is provided by the patient. Nausea   This is a new problem. The current episode started 2 days ago. The problem has been gradually worsening. The emesis has an appearance of stomach contents. There has been no fever. Associated symptoms include abdominal pain, diarrhea and myalgias. Pertinent negatives include no chills, no fever, no sweats, no headaches, no arthralgias, no cough, no URI and no headaches.         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     Nausea & vomiting     Other ill-defined conditions(039.89)     allergies    Pancreatitis chronic     7 episodes    Unspecified adverse effect of anesthesia     pt reports \"difficult to sedate       Past Surgical History:   Procedure Laterality Date    HX APPENDECTOMY      HX CHOLECYSTECTOMY      HX GYN      hysterectomy    HX ORTHOPAEDIC  2010    right EDIN    HX ORTHOPAEDIC  2010    Left  EDIN    HX OTHER SURGICAL  2007    I&D of 9 abcesses -MRSA    HX SHOULDER REPLACEMENT  12/2011    Right    HX SHOULDER REPLACEMENT  2015    left    HX TONSILLECTOMY           Family History:   Problem Relation Age of Onset    Cancer Mother         THROID CANCER    Diabetes Maternal Grandmother     Stroke Maternal Grandfather     Heart Disease Paternal Grandmother        Social History     Socioeconomic History    Marital status: SINGLE     Spouse name: Not on file    Number of children: Not on file    Years of education: Not on file    Highest education level: Not on file   Occupational History    Not on file   Social Needs    Financial resource strain: Not on file    Food insecurity     Worry: Not on file     Inability: Not on file    Transportation needs     Medical: Not on file     Non-medical: Not on file   Tobacco Use    Smoking status: Never Smoker    Smokeless tobacco: Never Used   Substance and Sexual Activity    Alcohol use: No    Drug use: No    Sexual activity: Not Currently   Lifestyle    Physical activity     Days per week: Not on file     Minutes per session: Not on file    Stress: Not on file   Relationships    Social connections     Talks on phone: Not on file     Gets together: Not on file     Attends Zoroastrianism service: Not on file     Active member of club or organization: Not on file     Attends meetings of clubs or organizations: Not on file     Relationship status: Not on file    Intimate partner violence     Fear of current or ex partner: Not on file     Emotionally abused: Not on file     Physically abused: Not on file     Forced sexual activity: Not on file   Other Topics Concern    Not on file   Social History Narrative    Not on file ALLERGIES: Latex, [place] oil, [place] nut, Peanut, Shellfish containing products, Beef containing products, Chicken derived, Chlorpromazine, Clindamycin, Dopamine receptor agonist, Gluten, Hydroxyzine pamoate, Ibuprofen, Iodine, Naproxen sodium, Nubain [nalbuphine], Other medication, Pcn [penicillins], Povidone-iodine, Prochlorperazine edisylate, Promethazine, Reglan [metoclopramide], Soy, Sulfa (sulfonamide antibiotics), Toradol [ketorolac tromethamine], Tramadol, and Zithromax [azithromycin]    Review of Systems   Constitutional: Negative. Negative for chills and fever. HENT: Negative. Eyes: Negative. Respiratory: Negative. Negative for cough. Cardiovascular: Negative. Gastrointestinal: Positive for abdominal pain, diarrhea and nausea. Negative for abdominal distention, anal bleeding, blood in stool, constipation, rectal pain and vomiting. Genitourinary: Negative. Musculoskeletal: Positive for myalgias. Negative for arthralgias. Skin: Negative. Neurological: Negative. Negative for headaches. Psychiatric/Behavioral: Negative. All other systems reviewed and are negative. Vitals:    03/17/21 1806   BP: (!) 155/88   Pulse: (!) 123   Resp: 18   Temp: 98.7 °F (37.1 °C)   SpO2: 96%   Weight: 83.9 kg (185 lb)   Height: 5' 4\" (1.626 m)            Physical Exam  Vitals signs and nursing note reviewed. Constitutional:       Appearance: She is well-developed. HENT:      Head: Normocephalic and atraumatic. Right Ear: External ear normal.      Left Ear: External ear normal.      Nose: Nose normal.   Eyes:      Conjunctiva/sclera: Conjunctivae normal.      Pupils: Pupils are equal, round, and reactive to light. Neck:      Musculoskeletal: Normal range of motion and neck supple. Cardiovascular:      Rate and Rhythm: Normal rate and regular rhythm. Heart sounds: Normal heart sounds.    Pulmonary:      Effort: Pulmonary effort is normal.      Breath sounds: Normal breath sounds. Abdominal:      General: Bowel sounds are normal.      Palpations: Abdomen is soft. Tenderness: There is generalized abdominal tenderness. Musculoskeletal: Normal range of motion. Skin:     General: Skin is warm and dry. Neurological:      Mental Status: She is alert and oriented to person, place, and time. Deep Tendon Reflexes: Reflexes are normal and symmetric. Psychiatric:         Behavior: Behavior normal.         Thought Content: Thought content normal.         Judgment: Judgment normal.          MDM  Number of Diagnoses or Management Options     Amount and/or Complexity of Data Reviewed  Clinical lab tests: reviewed  Discuss the patient with other providers: yes (Dr. Manuela Espinal, Dr. Mukesh Manning)    Risk of Complications, Morbidity, and/or Mortality  Presenting problems: moderate  Diagnostic procedures: moderate  Management options: moderate    Patient Progress  Patient progress: stable         Procedures      9:10 PM Spoke with Dr. Manuela Espinal, hospitalist and Dr. Mukesh Manning regarding patient. She will admit patient.

## 2021-03-18 NOTE — H&P
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 I do not believe she is in addisonian crisis at this time. Chronic abdominal pain (5/22/2020)  She cannot tolerate her p.o. medications, and will be managed with IV Dilaudid until she is able to tolerate p.o. Nausea & vomiting (1/12/2021)  Symptomatic treatment with IV fluids and Zofran      JORGE (acute kidney injury) ([facility] (3/17/2021)  IV fluids and monitor labs    Left foot skin graft: There should be no need for us to remove her dressing or do any additional care while she is here. Disposition: admit to inpatient, MedSurg  Diet: Regular, start slow with liquids and advance as tolerated  VTE ppx: Lovenox   GI ppx: Pepcid   CODE STATUS: Full    Surrogate decision-maker:     HISTORY OF PRESENT ILLNESS:  Patient was discussed with the ER provider prior to seeing the patient. Patient is a 38 y.o. female with medical h/o Gardiner's disease, chronic pancreatitis, and chronic abdominal pain, who presented to [facility] ED with nausea and vomiting. She reports she had a procedure done at [facility] this morning. It was a skin graft to her left foot. She states due to her Gardiner's disease, she is supposed to receive a loading dose of Solu-Cortef 125 mg prior to the procedure.   She reports they only gave her 48 and refused to consult her endocrinologist for further orders. She also states that despite her symptoms they would not admit her to the hospital as advised by her endocrinologist as well. She states she has been vomiting all day since the procedure. She drove to her mother's house here in [place], and subsequently her mother brought her to the ER. She is having increasing abdominal pain as she cannot tolerate her home medications. She states she is either throwing up her medications or they \"pass right through her as whole pills\". She denies chest pain, shortness of breath, cough, fever/chills. ER work-up does show her to have an elevated creatinine compared to her previous, and she is tachycardic. We are asked to admit for JORGE due to dehydration. REVIEW OF SYSTEMS:  A 14 point review of systems was taken and pertinent positive and negative as per HPI.     Allergies   Allergen Reactions    Latex Anaphylaxis    Wellington Oil Anaphylaxis    [place] Nut Anaphylaxis    Peanut Anaphylaxis    Shellfish Containing Products Anaphylaxis    Beef Containing Products Other (comments)     Per allergy testing    Chicken Derived Other (comments)     Per allergy testing    Chlorpromazine Other (comments)     Dystonic      Clindamycin Rash    Dopamine Receptor Agonist Other (comments)     Dystonic      Gluten Other (comments)     Per allergy testing    Hydroxyzine Pamoate Other (comments)     DYSTONIC      Ibuprofen Rash and Photosensitivity    Iodine Rash    Naproxen Sodium Rash    Nubain [Nalbuphine] Rash    Other Medication Other (comments)     Bananas, avacodo, strawberry, potatoes  --- ALL PER ALLERGY TESTING    Pcn [Penicillins] Rash    Povidone-Iodine Rash    Prochlorperazine Edisylate Other (comments)     Dystonic      Promethazine Other (comments)     Dystonic      Reglan [Metoclopramide] Anaphylaxis     dystonia    Soy Other (comments)     Per allergy testing    Sulfa (Sulfonamide Antibiotics) Rash    Toradol [Ketorolac Tromethamine] Rash    Tramadol Rash    Zithromax [Azithromycin] Rash       Prior to Admission Medications   Prescriptions Last Dose Informant Patient Reported? Taking? B.infantis-B.ani-B.long-B.bifi (PROBIOTIC 4X) 10-15 mg TbEC   Yes No   Sig: Take 1 Tab by mouth every morning. ESTRADIOL PO   Yes No   Sig: Take  by mouth daily. Epinephrine 0.15 mg/0.15 mL PnIj   Yes No   Sig: by IntraMUSCular route as needed. Epi-pen as needed for allergic reaction   HYDROmorphone (DILAUDID) 4 mg tablet   Yes No   Sig: Take  by mouth every three (3) hours as needed for Pain. Potassium Citrate 15 mEq TbER   Yes No   Sig: Take 15 mEq by mouth two (2) times a day. ZOFRAN 8 mg Tab  Self Yes No   Sig: Take 8 mg by mouth as needed. albuterol (PROVENTIL, VENTOLIN) 90 mcg/Actuation inhaler   Yes No   Sig: Take 2 Puffs by inhalation as needed. calcium citrate 200 mg (950 mg) tablet   Yes No   Sig: Take 200 mg by mouth three (3) times daily. furosemide (LASIX) 40 mg tablet   Yes No   Sig: Take 40 mg by mouth daily. hydrocortisone (CORTEF) 10 mg tablet   Yes No   Sig: Take 10 mg by mouth every morning. hydrocortisone (CORTEF) 5 mg tablet   Yes No   Sig: Take 5 mg by mouth nightly. lipase-protease-amylase (ZENPEP) capsule   Yes No   Sig: Take 1 Cap by mouth Before breakfast, lunch, dinner and at bedtime. montelukast (SINGULAIR) 10 mg tablet   Yes No   Sig: Take 10 mg by mouth every morning. omeprazole (PRILOSEC) 20 mg capsule   Yes No   Sig: Take 20 mg by mouth two (2) times a day. oxyCODONE CR (OXYCONTIN) 40 mg CR tablet   Yes No   Sig: Take 40 mg by mouth every twelve (12) hours.  Indications: CHRONIC PAIN      Facility-Administered Medications: None       Past Medical History:   Diagnosis Date    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    Arthritis     Aseptic necrosis of head of humerus ([facility] 12/8/2011    Asthma     uses MDI as needed - rarely-pt can't remember last time needed    Avascular necrosis of femur head     right hip, left hip replacements    Chronic pain     Endocrine disease diagnosed 2003    ADDISONS DISEASE    Endometriosis     hysterectomy    GERD (gastroesophageal reflux disease)     Infectious disease 2007    MRSA-2007, community acquired - in hospital [facility]) about 6 months - 3 months of which was in coma;  HPV-2009     Nausea & vomiting     Other ill-defined conditions(799.89)     allergies    Pancreatitis chronic     7 episodes    Unspecified adverse effect of anesthesia     pt reports \"difficult to sedate     Past Surgical History:   Procedure Laterality Date    HX APPENDECTOMY      HX CHOLECYSTECTOMY      HX GYN      hysterectomy    HX ORTHOPAEDIC  2010    right EDIN    HX ORTHOPAEDIC  2010    Left  EDIN    HX OTHER SURGICAL  2007    I&D of 9 abcesses -MRSA    HX SHOULDER REPLACEMENT  12/2011    Right    HX SHOULDER REPLACEMENT  2015    left    HX TONSILLECTOMY         Social History     Tobacco Use    Smoking status: Never Smoker    Smokeless tobacco: Never Used   Substance Use Topics    Alcohol use: No       FAMILY HISTORY:  Reviewed and non-contributory to admitting problem, unless otherwise stated in the HPI    Family History   Problem Relation Age of Onset    Cancer Mother         THROID CANCER    Diabetes Maternal Grandmother     Stroke Maternal Grandfather     Heart Disease Paternal Grandmother              PHYSICAL EXAM:    Patient Vitals for the past 24 hrs:   Temp Pulse Resp BP SpO2   03/17/21 2151 98.7 °F (37.1 °C) (!) 118 18 (!) 150/87 96 %   03/17/21 1806 98.7 °F (37.1 °C) (!) 123 18 (!) 155/88 96 %     Visit Vitals  BP (!) 150/87   Pulse (!) 118   Temp 98.7 °F (37.1 °C)   Resp 18   Ht 5' 4\" (1.626 m)   Wt 83.9 kg (185 lb)   SpO2 96%   BMI 31.76 kg/m²       General:    WD and WN, No apparent distress.    Eyes:  PERRL; EOMI; sclera normal/non-icteric  HENT:  Normocephalic, without obvious abnormality; Oropharynx is clear with dry mucous membranes   Resp:    Clear to auscultation bilaterally. Resp are even and unlabored  CVS/Heart: Regular rate and rhythm,  No LE edema    GI:    Soft, non-tender. Not distended. Bowel sounds normal.     Musc/SK: Muscle strength is appropriate for age/condition; No cyanosis. No clubbing  Skin:  No obvious rash/lesions  Neurologic: CN II - XII are grossly intact - Eye exam as noted above; non focal  Psych: Alert and oriented x 4;  Judgement and insight are normal      Intake/Output last 3 shifts:      Labs:  CMP:   Lab Results   Component Value Date/Time     03/17/2021 07:58 PM    K 4.1 03/17/2021 07:58 PM     03/17/2021 07:58 PM    CO2 31 03/17/2021 07:58 PM    AGAP 8 03/17/2021 07:58 PM     (H) 03/17/2021 07:58 PM    BUN 10 03/17/2021 07:58 PM    CREA 1.19 (H) 03/17/2021 07:58 PM    GFRAA >60 03/17/2021 07:58 PM    GFRNA 53 (L) 03/17/2021 07:58 PM    CA 8.8 03/17/2021 07:58 PM    ALB 2.8 (L) 03/17/2021 07:58 PM    TBILI 0.3 03/17/2021 07:58 PM    TP 6.5 03/17/2021 07:58 PM    GLOB 3.7 (H) 03/17/2021 07:58 PM    AGRAT 0.8 (L) 03/17/2021 07:58 PM    ALT 21 03/17/2021 07:58 PM         CBC:    Lab Results   Component Value Date/Time    WBC 14.4 (H) 03/17/2021 07:58 PM    HGB 11.0 (L) 03/17/2021 07:58 PM    HCT 35.5 (L) 03/17/2021 07:58 PM     03/17/2021 07:58 PM       Lab Results   Component Value Date/Time    INR 1.0 10/14/2015 07:03 PM    INR 1.0 12/05/2011 09:45 AM    Prothrombin time 10.2 10/14/2015 07:03 PM    Prothrombin time 10.3 12/05/2011 09:45 AM       ABG:  No results found for: PH, PHI, PCO2, PCO2I, PO2, PO2I, HCO3, HCO3I, FIO2, FIO2I        No results found for: CPK, RCK1, RCK2, RCK3, RCK4, CKMB, CKNDX, CKND1, TROPT, TROIQ, BNPP, BNP    Imaging & Other Studies:  No results found.    EKG Results     None          Active Problems:  Patient Active Problem List    Diagnosis Date Noted    [facility] 03/17/2021    Nausea & vomiting 01/12/2021    Chronic abdominal pain 05/22/2020    Anemia 04/27/2012    [place]'s disease ([facility] 12/10/2011    Asthma 12/10/2011    Chronic pain syndrome 12/10/2011         Tk Ellington MD  [facility] Hospitalist Service  3/17/2021 10:00 PM

## 2021-03-18 NOTE — PROGRESS NOTES
Hospitalist Note     Admit Date:  3/17/2021  7:14 PM   Name:  Micael Dubin   Age:  38 y.o.  :  1980   MRN:  [id number]   PCP:  Jolynn De La Cruz MD  Treatment Team: Attending Provider: Nicolasa Gamble MD; Staff Nurse: Radha Butterfield RN; Utilization Review: Alexsandra Daniels RN; Care Manager: Srini Mary; Primary Nurse: Paulie Christy RN      HPI/Subjective:   43-year-old female with BMI 34 and significant past medical history of Roney disease, chronic pancreatitis, chronic pain and recent skin graft of left lower extremity presented to emergency room with nausea and vomiting. Patient has been admitted with elevated creatinine, nausea and vomiting and working diagnosis of chronic pancreatitis. 2021:    As soon as I entered, patient is requesting to get IV Dilaudid and IV steroid. As per patient she needs IV pain medication given her recent surgery. Also she is complaining of soakage of her dressing. As per patient she did not get any discharge instruction from the surgeon [when I told patient that it should not possible then she informed me that the said somebody will come and change the dressing on Monday]. She wants me to give her IV steroids. Also, as per patient she would like to see gastroenterologist given her chronic pancreatitis as she was supposed to have a EUS in the past.    No nausea vomiting since yesterday. Patient is able to tolerate water. Requested IV pain medication multiple times. Denies any chest pain, shortness of breath or palpitations. Rest review of system negative except mentioned above. Plan:  #Elevated creatinine: Patient baseline creatinine is 0.8-0.9.  -Likely secondary to dehydration. Improving. Will monitor.  -Changed IV fluid to LR. #Chronic pancreatitis: Cannot rule out acute flare. Home pain regimen with addition of oxycodone. As per patient she takes OxyContin 30 mg twice daily at home.   I have stopped her IV Dilaudid. Advance diet as tolerated. GI consulted although I have informed patient that her liver enzymes are within normal limit. Pancreatic enzyme added to the regimen. #Recent plastic surgery wound: Plastic surgery consulted. #Roney's disease: Patient is not tachycardic and her blood pressure is good. She is tolerating p.o. diet. Will increase her cortisone to 10 mg 3 times a day and will monitor.  -Home potassium resumed. #Nausea vomiting: Resolved. Checking urine pregnancy test.    Lovenox for DVT prophylaxis. Advance diet as tolerated. Patient is AOx3. Her grandmother was present at bedside. Patient verbalized increased morbidity and mortality associated with high dose opioid. Both verbalized understanding that patient condition may deteriorate in spite of treatment. Possible discharge in next 24 hours once patient is able to tolerate p.o. diet. Patient is of high complexity given significant opioid use. Objective:     Patient Vitals for the past 24 hrs:   Temp Pulse Resp BP SpO2   03/18/21 0800 98.1 °F (36.7 °C) 88 16 (!) 155/86 96 %   03/18/21 0440 97.5 °F (36.4 °C) 84 20 (!) 149/87 98 %   03/17/21 2308 97.8 °F (36.6 °C) 92 18 (!) 147/92 94 %   03/17/21 2151 98.7 °F (37.1 °C) (!) 118 18 (!) 150/87 96 %   03/17/21 1806 98.7 °F (37.1 °C) (!) 123 18 (!) 155/88 96 %     Oxygen Therapy  O2 Sat (%): 96 % (03/18/21 0800)  O2 Device: Room air (03/18/21 0800)    Estimated body mass index is 34.3 kg/m² as calculated from the following:    Height as of this encounter: 5' 4\" (1.626 m). Weight as of this encounter: 90.6 kg (199 lb 12.8 oz). No intake or output data in the 24 hours ending 03/18/21 1129    *Note that automatically entered I/Os may not be accurate; dependent on patient compliance with collection and accurate  by techs. General:    BMI 34.3. Slight moonlike facies. AOx3. No acute distress. CV:   RRR. No murmur, rub, or gallop.   Lungs:   Is air entry bilateral lower lobe otherwise CTAB. No wheezing, rhonchi, or rales. Abdomen:   Soft, nontender, nondistended. Extremities: Warm and dry. No cyanosis or edema. Skin:     No rashes or jaundice. Neuro:  No gross focal deficits  Dressing on left lower extremities and was not taken off on patient's request.    Data Reviewed:  I have reviewed all labs, meds, and studies from the last 24 hours:  Recent Results (from the past 24 hour(s))   CBC WITH AUTOMATED DIFF    Collection Time: 03/17/21  7:58 PM   Result Value Ref Range    WBC 14.4 (H) 4.3 - 11.1 K/uL    RBC 4.01 (L) 4.05 - 5.2 M/uL    HGB 11.0 (L) 11.7 - 15.4 g/dL    HCT 35.5 (L) 35.8 - 46.3 %    MCV 88.5 79.6 - 97.8 FL    MCH 27.4 26.1 - 32.9 PG    MCHC 31.0 (L) 31.4 - 35.0 g/dL    RDW 15.0 (H) 11.9 - 14.6 %    PLATELET 432 837 - 302 K/uL    MPV 9.2 (L) 9.4 - 12.3 FL    ABSOLUTE NRBC 0.00 0.0 - 0.2 K/uL    DF AUTOMATED      NEUTROPHILS 92 (H) 43 - 78 %    LYMPHOCYTES 7 (L) 13 - 44 %    MONOCYTES 1 (L) 4.0 - 12.0 %    EOSINOPHILS 0 (L) 0.5 - 7.8 %    BASOPHILS 0 0.0 - 2.0 %    IMMATURE GRANULOCYTES 1 0.0 - 5.0 %    ABS. NEUTROPHILS 13.3 (H) 1.7 - 8.2 K/UL    ABS. LYMPHOCYTES 1.0 0.5 - 4.6 K/UL    ABS. MONOCYTES 0.1 0.1 - 1.3 K/UL    ABS. EOSINOPHILS 0.0 0.0 - 0.8 K/UL    ABS. BASOPHILS 0.0 0.0 - 0.2 K/UL    ABS. IMM. GRANS. 0.1 0.0 - 0.5 K/UL   METABOLIC PANEL, COMPREHENSIVE    Collection Time: 03/17/21  7:58 PM   Result Value Ref Range    Sodium 139 136 - 145 mmol/L    Potassium 4.1 3.5 - 5.1 mmol/L    Chloride 100 98 - 107 mmol/L    CO2 31 21 - 32 mmol/L    Anion gap 8 7 - 16 mmol/L    Glucose 137 (H) 65 - 100 mg/dL    BUN 10 6 - 23 MG/DL    Creatinine 1.19 (H) 0.6 - 1.0 MG/DL    GFR est AA >60 >60 ml/min/1.73m2    GFR est non-AA 53 (L) >60 ml/min/1.73m2    Calcium 8.8 8.3 - 10.4 MG/DL    Bilirubin, total 0.3 0.2 - 1.1 MG/DL    ALT (SGPT) 21 12 - 65 U/L    AST (SGOT) 14 (L) 15 - 37 U/L    Alk.  phosphatase 71 50 - 136 U/L    Protein, total 6.5 6.3 - 8.2 g/dL    Albumin 2.8 (L) 3.5 - 5.0 g/dL    Globulin 3.7 (H) 2.3 - 3.5 g/dL    A-G Ratio 0.8 (L) 1.2 - 3.5     LIPASE    Collection Time: 03/17/21  7:58 PM   Result Value Ref Range    Lipase 37 (L) 73 - 673 U/L   METABOLIC PANEL, BASIC    Collection Time: 03/18/21  5:14 AM   Result Value Ref Range    Sodium 142 136 - 145 mmol/L    Potassium 4.1 3.5 - 5.1 mmol/L    Chloride 106 98 - 107 mmol/L    CO2 30 21 - 32 mmol/L    Anion gap 6 (L) 7 - 16 mmol/L    Glucose 134 (H) 65 - 100 mg/dL    BUN 11 6 - 23 MG/DL    Creatinine 1.10 (H) 0.6 - 1.0 MG/DL    GFR est AA >60 >60 ml/min/1.73m2    GFR est non-AA 58 (L) >60 ml/min/1.73m2    Calcium 8.1 (L) 8.3 - 10.4 MG/DL   MAGNESIUM    Collection Time: 03/18/21  5:14 AM   Result Value Ref Range    Magnesium 1.8 1.8 - 2.4 mg/dL   CBC WITH AUTOMATED DIFF    Collection Time: 03/18/21  5:14 AM   Result Value Ref Range    WBC 13.9 (H) 4.3 - 11.1 K/uL    RBC 3.68 (L) 4.05 - 5.2 M/uL    HGB 9.9 (L) 11.7 - 15.4 g/dL    HCT 33.2 (L) 35.8 - 46.3 %    MCV 90.2 79.6 - 97.8 FL    MCH 26.9 26.1 - 32.9 PG    MCHC 29.8 (L) 31.4 - 35.0 g/dL    RDW 15.2 (H) 11.9 - 14.6 %    PLATELET 440 766 - 447 K/uL    MPV 9.4 9.4 - 12.3 FL    ABSOLUTE NRBC 0.00 0.0 - 0.2 K/uL    DF AUTOMATED      NEUTROPHILS 84 (H) 43 - 78 %    LYMPHOCYTES 12 (L) 13 - 44 %    MONOCYTES 4 4.0 - 12.0 %    EOSINOPHILS 0 (L) 0.5 - 7.8 %    BASOPHILS 0 0.0 - 2.0 %    IMMATURE GRANULOCYTES 1 0.0 - 5.0 %    ABS. NEUTROPHILS 11.7 (H) 1.7 - 8.2 K/UL    ABS. LYMPHOCYTES 1.6 0.5 - 4.6 K/UL    ABS. MONOCYTES 0.5 0.1 - 1.3 K/UL    ABS. EOSINOPHILS 0.0 0.0 - 0.8 K/UL    ABS. BASOPHILS 0.0 0.0 - 0.2 K/UL    ABS. IMM.  GRANS. 0.1 0.0 - 0.5 K/UL        Current Meds:  Current Facility-Administered Medications   Medication Dose Route Frequency    HYDROmorphone (DILAUDID) injection 0.5 mg  0.5 mg IntraVENous ONCE PRN    lipase-protease-amylase (ZENPEP 5,000) capsule 1 Cap  1 Cap Oral TID WITH MEALS    hydrocortisone (CORTEF) tablet 10 mg  10 mg Oral TID    lactated Ringers infusion  100 mL/hr IntraVENous CONTINUOUS    oxyCODONE IR (ROXICODONE) tablet 5 mg  5 mg Oral Q8H PRN    albuterol CONCENTRATE 2.5mg/0.5 mL neb soln  2.5 mg Inhalation Q4H PRN    calcium carbonate (OS-KEITH) tablet 500 mg [elemental]  500 mg Oral TID WITH MEALS    [Held by provider] furosemide (LASIX) tablet 40 mg  40 mg Oral DAILY    [Held by provider] hydrocortisone (CORTEF) tablet 5 mg  5 mg Oral QHS    . PHARMACY TO SUBSTITUTE PER PROTOCOL (Reordered from: lipase-protease-amylase (ZENPEP) capsule)    Per Protocol    montelukast (SINGULAIR) tablet 10 mg  10 mg Oral 7am    pantoprazole (PROTONIX) tablet 40 mg  40 mg Oral ACB    oxyCODONE ER (OxyCONTIN) tablet 30 mg  30 mg Oral Q12H    sodium chloride (NS) flush 5-40 mL  5-40 mL IntraVENous Q8H    sodium chloride (NS) flush 5-40 mL  5-40 mL IntraVENous PRN    acetaminophen (TYLENOL) tablet 650 mg  650 mg Oral Q6H PRN    Or    acetaminophen (TYLENOL) suppository 650 mg  650 mg Rectal Q6H PRN    enoxaparin (LOVENOX) injection 40 mg  40 mg SubCUTAneous DAILY    ondansetron (ZOFRAN) injection 4 mg  4 mg IntraVENous Q4H PRN       Other Studies:  No results found for this visit on 03/17/21. No results found.     All Micro Results     None          SARS-CoV-2 Lab Results  \"Novel Coronavirus\" Test: No results found for: COV2NT   \"Emergent Disease\" Test: No results found for: EDPR  \"SARS-COV-2\" Test: No results found for: XGCOVT  \"Precision Labs\" Test: No results found for: RSLT  Rapid Test: No results found for: COVR         Assessment and Plan:     Hospital Problems as of 3/18/2021 Date Reviewed: 2/12/2016          Codes Class Noted - Resolved POA    * (Principal) JORGE (acute kidney [facility]) ([facility] ICD-10-CM: N17.9  ICD-9-CM: 584.9  3/17/2021 - Present Yes        Nausea & vomiting ICD-10-CM: R11.2  ICD-9-CM: 787.01  1/12/2021 - Present Yes        Chronic abdominal pain (Chronic) ICD-10-CM: R10.9, G89.29  ICD-9-CM: 789.00, 338.29 5/22/2020 - Present Yes        [place]agoner's disease ([facility] (Chronic) ICD-10-CM: E27.1  ICD-9-CM: 255.41  12/10/2011 - Present Yes              Signed:  Radha Martínez MD

## 2021-03-18 NOTE — PROGRESS NOTES
Care Management Interventions  PCP Verified by CM: Yes([facility])  Mode of Transport at Discharge: Other (see comment)(Family)  Transition of Care Consult (CM Consult): [address]: No  Reason Outside [place]: Patient already serviced by other home care/hospice agency  Setht Signup: No  Discharge Durable Medical Equipment: No  Physical Therapy Consult: No  Occupational Therapy Consult: No  Speech Therapy Consult: No  Current Support Network: Own Home, Family Lives Nearby  Confirm Follow Up Transport: Family  The Patient and/or Patient Representative was Provided with a Choice of Provider and Agrees with the Discharge Plan?: Yes  Freedom of Choice List was Provided with Basic Dialogue that Supports the Patient's Individualized Plan of Care/Goals, Treatment Preferences and Shares the Quality Data Associated with the Providers?: Yes  Discharge Location  Discharge Placement: Home with home health    SW met with patient and her mother while social distancing w/appropriate PPE. Patient lives alone, but her mother has been staying with her to provide support/assistance. Since October, patient has been using a cane. She also has a rolling walker and bedside commode that she doesn't use regularly. Patient is current with [address] [place] [address],[address] (RN - wound care). SW will follow-up with [facility] (P: [phone], F: [phone]) to reinitiate services at discharge.     YARITZA Jacobo-GWENDOLYN  [address] [facility]   [phone]

## 2021-03-19 VITALS
TEMPERATURE: 98.1 F | HEART RATE: 97 BPM | HEIGHT: 64 IN | SYSTOLIC BLOOD PRESSURE: 134 MMHG | DIASTOLIC BLOOD PRESSURE: 78 MMHG | OXYGEN SATURATION: 94 % | BODY MASS INDEX: 34.11 KG/M2 | RESPIRATION RATE: 16 BRPM | WEIGHT: 199.8 LBS

## 2021-03-19 PROBLEM — R11.2 NAUSEA & VOMITING: Status: RESOLVED | Noted: 2021-01-12 | Resolved: 2021-03-19

## 2021-03-19 PROBLEM — S81.802A WOUND OF LEFT LEG: Status: ACTIVE | Noted: 2021-03-19

## 2021-03-19 PROBLEM — N17.9 AKI (ACUTE KIDNEY INJURY) (HCC): Status: RESOLVED | Noted: 2021-03-17 | Resolved: 2021-03-19

## 2021-03-19 LAB
ANION GAP SERPL CALC-SCNC: 6 MMOL/L (ref 7–16)
BASOPHILS # BLD: 0 K/UL (ref 0–0.2)
BASOPHILS NFR BLD: 0 % (ref 0–2)
BUN SERPL-MCNC: 7 MG/DL (ref 6–23)
CALCIUM SERPL-MCNC: 8.3 MG/DL (ref 8.3–10.4)
CHLORIDE SERPL-SCNC: 107 MMOL/L (ref 98–107)
CO2 SERPL-SCNC: 29 MMOL/L (ref 21–32)
CREAT SERPL-MCNC: 0.86 MG/DL (ref 0.6–1)
DIFFERENTIAL METHOD BLD: ABNORMAL
EOSINOPHIL # BLD: 0 K/UL (ref 0–0.8)
EOSINOPHIL NFR BLD: 0 % (ref 0.5–7.8)
ERYTHROCYTE [DISTWIDTH] IN BLOOD BY AUTOMATED COUNT: 15.9 % (ref 11.9–14.6)
GLUCOSE SERPL-MCNC: 77 MG/DL (ref 65–100)
HCT VFR BLD AUTO: 31.3 % (ref 35.8–46.3)
HGB BLD-MCNC: 9.6 G/DL (ref 11.7–15.4)
IMM GRANULOCYTES # BLD AUTO: 0.1 K/UL (ref 0–0.5)
IMM GRANULOCYTES NFR BLD AUTO: 1 % (ref 0–5)
LYMPHOCYTES # BLD: 2.2 K/UL (ref 0.5–4.6)
LYMPHOCYTES NFR BLD: 21 % (ref 13–44)
MAGNESIUM SERPL-MCNC: 1.7 MG/DL (ref 1.8–2.4)
MCH RBC QN AUTO: 27.7 PG (ref 26.1–32.9)
MCHC RBC AUTO-ENTMCNC: 30.7 G/DL (ref 31.4–35)
MCV RBC AUTO: 90.5 FL (ref 79.6–97.8)
MONOCYTES # BLD: 0.7 K/UL (ref 0.1–1.3)
MONOCYTES NFR BLD: 6 % (ref 4–12)
NEUTS SEG # BLD: 7.3 K/UL (ref 1.7–8.2)
NEUTS SEG NFR BLD: 71 % (ref 43–78)
NRBC # BLD: 0 K/UL (ref 0–0.2)
PHOSPHATE SERPL-MCNC: 3.1 MG/DL (ref 2.5–4.5)
PLATELET # BLD AUTO: 275 K/UL (ref 150–450)
PMV BLD AUTO: 9.4 FL (ref 9.4–12.3)
POTASSIUM SERPL-SCNC: 3.1 MMOL/L (ref 3.5–5.1)
RBC # BLD AUTO: 3.46 M/UL (ref 4.05–5.2)
SODIUM SERPL-SCNC: 142 MMOL/L (ref 136–145)
WBC # BLD AUTO: 10.2 K/UL (ref 4.3–11.1)

## 2021-03-19 PROCEDURE — 74011250637 HC RX REV CODE- 250/637: Performed by: FAMILY MEDICINE

## 2021-03-19 PROCEDURE — 96375 TX/PRO/DX INJ NEW DRUG ADDON: CPT

## 2021-03-19 PROCEDURE — 74011250636 HC RX REV CODE- 250/636: Performed by: FAMILY MEDICINE

## 2021-03-19 PROCEDURE — 83735 ASSAY OF MAGNESIUM: CPT

## 2021-03-19 PROCEDURE — 74011250637 HC RX REV CODE- 250/637: Performed by: INTERNAL MEDICINE

## 2021-03-19 PROCEDURE — 36415 COLL VENOUS BLD VENIPUNCTURE: CPT

## 2021-03-19 PROCEDURE — 99218 HC RM OBSERVATION: CPT

## 2021-03-19 PROCEDURE — 80048 BASIC METABOLIC PNL TOTAL CA: CPT

## 2021-03-19 PROCEDURE — 84100 ASSAY OF PHOSPHORUS: CPT

## 2021-03-19 PROCEDURE — 74011250636 HC RX REV CODE- 250/636: Performed by: INTERNAL MEDICINE

## 2021-03-19 PROCEDURE — 96372 THER/PROPH/DIAG INJ SC/IM: CPT

## 2021-03-19 PROCEDURE — 85025 COMPLETE CBC W/AUTO DIFF WBC: CPT

## 2021-03-19 RX ORDER — MAGNESIUM SULFATE HEPTAHYDRATE 40 MG/ML
2 INJECTION, SOLUTION INTRAVENOUS ONCE
Status: COMPLETED | OUTPATIENT
Start: 2021-03-19 | End: 2021-03-19

## 2021-03-19 RX ORDER — MORPHINE SULFATE 15 MG/1
15 TABLET ORAL
COMMUNITY

## 2021-03-19 RX ORDER — METOPROLOL SUCCINATE 25 MG/1
25 TABLET, EXTENDED RELEASE ORAL DAILY
COMMUNITY

## 2021-03-19 RX ORDER — CEPHALEXIN 500 MG/1
500 CAPSULE ORAL 2 TIMES DAILY
COMMUNITY

## 2021-03-19 RX ORDER — ALENDRONATE SODIUM 70 MG/1
70 TABLET ORAL
COMMUNITY

## 2021-03-19 RX ADMIN — OXYCODONE 5 MG: 5 TABLET ORAL at 12:03

## 2021-03-19 RX ADMIN — Medication 5 ML: at 06:06

## 2021-03-19 RX ADMIN — HYDROCORTISONE 10 MG: 10 TABLET ORAL at 17:24

## 2021-03-19 RX ADMIN — HYDROCORTISONE 10 MG: 10 TABLET ORAL at 08:09

## 2021-03-19 RX ADMIN — POTASSIUM BICARBONATE 40 MEQ: 782 TABLET, EFFERVESCENT ORAL at 08:48

## 2021-03-19 RX ADMIN — CALCIUM 500 MG: 500 TABLET ORAL at 08:09

## 2021-03-19 RX ADMIN — POTASSIUM BICARBONATE 40 MEQ: 782 TABLET, EFFERVESCENT ORAL at 12:04

## 2021-03-19 RX ADMIN — PANCRELIPASE LIPASE, PANCRELIPASE PROTEASE, PANCRELIPASE AMYLASE 1 CAPSULE: 5000; 17000; 24000 CAPSULE, DELAYED RELEASE ORAL at 17:24

## 2021-03-19 RX ADMIN — PANTOPRAZOLE SODIUM 40 MG: 40 TABLET, DELAYED RELEASE ORAL at 08:09

## 2021-03-19 RX ADMIN — ENOXAPARIN SODIUM 40 MG: 40 INJECTION, SOLUTION INTRAVENOUS; SUBCUTANEOUS at 08:11

## 2021-03-19 RX ADMIN — CALCIUM 500 MG: 500 TABLET ORAL at 17:24

## 2021-03-19 RX ADMIN — MAGNESIUM SULFATE HEPTAHYDRATE 2 G: 40 INJECTION, SOLUTION INTRAVENOUS at 08:12

## 2021-03-19 RX ADMIN — MONTELUKAST SODIUM 10 MG: 10 TABLET, COATED ORAL at 08:09

## 2021-03-19 RX ADMIN — POTASSIUM CHLORIDE 10 MEQ: 750 TABLET, EXTENDED RELEASE ORAL at 08:09

## 2021-03-19 RX ADMIN — PANCRELIPASE LIPASE, PANCRELIPASE PROTEASE, PANCRELIPASE AMYLASE 1 CAPSULE: 5000; 17000; 24000 CAPSULE, DELAYED RELEASE ORAL at 12:01

## 2021-03-19 RX ADMIN — PANCRELIPASE LIPASE, PANCRELIPASE PROTEASE, PANCRELIPASE AMYLASE 1 CAPSULE: 5000; 17000; 24000 CAPSULE, DELAYED RELEASE ORAL at 08:09

## 2021-03-19 RX ADMIN — OXYCODONE HYDROCHLORIDE 30 MG: 10 TABLET, FILM COATED, EXTENDED RELEASE ORAL at 08:08

## 2021-03-19 RX ADMIN — OXYCODONE 5 MG: 5 TABLET ORAL at 01:50

## 2021-03-19 RX ADMIN — CALCIUM 500 MG: 500 TABLET ORAL at 12:03

## 2021-03-19 NOTE — PROGRESS NOTES
03/19/21 0150   Pain 1   Pain Scale 1 Numeric (0 - 10)   Pain Intensity 1 10   Patient Stated Pain Goal 6   Pain Location 1 Leg   Pain Orientation 1 Left   Pain Description 1 Stabbing; Sore   Pain Intervention(s) 1 Medication (see MAR)   Activity and Safety   Activity In bed;Up ad rosanna; Watching t.v.       Oxycodone given for pain.

## 2021-03-19 NOTE — PROGRESS NOTES
Patient complains of pain 10/10 on her RUQ tummy area and L leg skin graft. Dr. Lois Mccann called and received and order for one time Dilaudid IV 1mg . Verbal readback provided.

## 2021-03-19 NOTE — DISCHARGE INSTRUCTIONS
Patient Education   Patient Education        Patient Education        Learning About Prescribed Opioid Medicine for Chronic Pain  Introduction     Opioids are medicines used to relieve moderate to severe pain. Examples include fentanyl, hydrocodone, morphine, and oxycodone. Heroin is an example of an illegal opioid. They may be used for long-term pain, such as for cancer. Or they may be used when other treatments haven't worked. Opioids relieve pain by changing the way your body feels pain and the way you feel about pain. They don't cure a health problem. But they may help you manage the pain. Your doctor will talk with you about how they fit into your overall treatment plan. Doctors follow a strict set of rules for giving opioids to their patients who have long-term (chronic) pain. This is because they can be dangerous. They can cause problems if they are not used correctly. They can also be misused. And they may not do a better job of treating pain than non-opioids, like acetaminophen and NSAIDs. What happens before you get a prescription? Your doctor may talk with you about your pain history. He or she may ask about your family history. You may have a physical exam. You may also see a pain specialist. This helps your doctor decide if an opioid is right for you. Your doctor will also review your history of opioid use. Most states have a program that tracks prescriptions. If your state does, your doctor will check it to see if you've had prescriptions for opioids before. You may have a urine test to check for opioids in your system. If you're a woman, you may have a pregnancy test. Opioids are not safe to take if you are pregnant. When your doctor prescribes the medicine, he or she will discuss your treatment plan with you. That includes the risks and benefits of opioid medicines. Your doctor will tell you how much pain relief and improved ability to function you can expect.  The goal is to take the lowest dose that improves your pain for the shortest amount of time. Your doctor will also talk with you about other things you can do to help relieve pain. Your doctor may suggest different medicines or other options, such as steroid injections, ice or heat, physical therapy, or ways to reduce stress. It's always a good idea to ask questions before you get a new prescription. This is especially true when you get a prescription for an opioid. Some questions to ask your doctor include:  · Why do I need this medicine? Is it right for me? · How long should I take this medicine? · What can I do to help with side effects, like constipation? · How should I store this medicine? What should I do with leftover or unused opioid medicine? · Do I need a prescription for naloxone? You and your doctor will talk about your responsibilities. You may sign a written agreement. You will agree to take your medicine exactly as your doctor tells you to. Paulita Dakin also agree to be careful with it and not to share it with others. What happens after you start to use your medicine? Regular follow-up visits to your doctor will help you and your doctor make sure that the medicine is the right treatment for your pain. At every visit, your doctor will check these things:  · Is your pain being controlled? · Are you better able to function and be active? · Are you having any side effects, like constipation, nausea, or being too sleepy? · Are the non-opioid pain control measures working? Are there other things you can try? · Are there any signs that you are misusing your medicine? While you are taking an opioid, you may have drug tests and prescription history checks from time to time. Follow-up care is a key part of your treatment and safety. Be sure to make and go to all appointments, and call your doctor if you are having problems. It's also a good idea to know your test results and keep a list of the medicines you take.   Where can you learn more? Go to http://www.gray.com/  Enter D374 in the search box to learn more about \"Learning About Prescribed Opioid Medicine for Chronic Pain. \"  Current as of: November 20, 2019               Content Version: 12.6  © 7190-5084 [employer]. Care instructions adapted under license by [employer] (which disclaims liability or warranty for this information). If you have questions about a medical condition or this instruction, always ask your healthcare professional. [address] any warranty or liability for your use of this information. Nausea and Vomiting After Surgery: Care Instructions  Your Care Instructions     After you've had surgery, you may feel sick to your stomach (nauseated) or you may vomit. Sometimes anesthesia can make you feel sick. It's a common side effect and often doesn't last long. Pain also can make you feel sick or vomit. After the anesthesia wears off, you may feel pain from the incision (cut). That pain could then upset your stomach. Taking pain medicine can also make you feel sick to your stomach. Whatever the cause, you may get medicine that can help. There are also some things you can do at home to prevent nausea and feel better. The doctor has checked you carefully, but problems can develop later. If you notice any problems or new symptoms, get medical treatment right away. Follow-up care is a key part of your treatment and safety. Be sure to make and go to all appointments, and call your doctor if you are having problems. It's also a good idea to know your test results and keep a list of the medicines you take. How can you care for yourself at home? · Be safe with medicines. Read and follow all instructions on the label. ? If the doctor gave you a prescription medicine for pain, take it as prescribed.   ? If you are not taking a prescription pain medicine, ask your doctor if you can take an over-the-counter medicine. · Take your pain medicine as soon as you have pain. It works better if you take it before the pain gets bad. · Call your doctor if you have any problems with your medicine. · Rest in bed until you feel better. · To prevent dehydration, drink plenty of fluids, enough so that your urine is light yellow or clear like water. Choose water and other caffeine-free clear liquids until you feel better. If you have kidney, heart, or liver disease and have to limit fluids, talk with your doctor before you increase the amount of fluids you drink. · When you are able to eat, try clear soups, mild foods, and liquids until all symptoms are gone for 12 to 48 hours. Other good choices include dry toast, crackers, cooked cereal, and gelatin dessert, such as Jell-O.  · Do not smoke. Smoking and being around smoke can make nausea worse. If you need help quitting, talk to your doctor about stop-smoking programs and medicines. These can increase your chances of quitting for good. When should you call for help? Call  911 anytime you think you may need emergency care. For example, call if:    · You passed out (lost consciousness). Call your doctor now or seek immediate medical care if:    · You have new or worse nausea or vomiting.     · You are too sick to your stomach to drink any fluids.     · You cannot keep down fluids.     · You have symptoms of dehydration, such as:  ? Dry eyes and a dry mouth. ? Passing only a little dark urine. ? Feeling thirstier than usual.     · Your pain medicine is not helping.     · You are dizzy or lightheaded, or you feel like you may faint. Watch closely for changes in your health, and be sure to contact your doctor if:    · You do not get better as expected. Where can you learn more? Go to http://www.gray.com/  Enter M536 in the search box to learn more about \"Nausea and Vomiting After Surgery: Care Instructions. \"  Current as of: June 26, 2019               Content Version: 12.6  © 1078-4594 [employer], [employer]. Care instructions adapted under license by [employer] (which disclaims liability or warranty for this information). If you have questions about a medical condition or this instruction, always ask your healthcare professional. [address] any warranty or liability for your use of this information. Call 911 or go to the emergency room if you develop any red flag signs we discussed today. Please notify your primary care if any test/investigation is not covered by your insurance before getting it done. If you are pregnant or planning to get pregnant then please notify your primary care/doctor as some medication can be harmful to baby and a pregnant woman. Follow the wound instruction as provided by your plastic surgeon. Any issues with wound please give them a call.

## 2021-03-19 NOTE — DISCHARGE SUMMARY
Hospitalist Discharge Summary     Admit Date:  3/17/2021  7:14 PM   DC note date: 3/19/2021  Name:  Yeison Navarro   Age:  38 y.o.  :  1980   MRN:  [id number]   PCP:  Janice Nicholson MD  Treatment Team: Attending Provider: Cathryn Scherer MD; Utilization Review: Tamiko Wilson RN; Care Manager: Hardy Trent; Consulting Provider: Yasemin Hernandez MD; Consulting Provider: Rosas Mahmood MD; Primary Nurse: Wolf Saldana RN    Problem List for this Hospitalization:  Hospital Problems as of 3/19/2021 Date Reviewed: 2016          Codes Class Noted - Resolved POA    Wound of left leg ICD-10-CM: P35.348X  ICD-9-CM: 891.0  3/19/2021 - Present Unknown        Chronic abdominal pain (Chronic) ICD-10-CM: R10.9, G89.29  ICD-9-CM: 789.00, 338.29  2020 - Present Yes        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 Patient has been admitted with elevated creatinine, nausea and vomiting and working diagnosis of chronic pancreatitis. Apparently per patient she tried to get admitted to [facility] but there was not enough evidence to admit her. On the way to stay with her grandmother, she started having vomiting so she came to [facility].  On presentation, she had elevated creatinine. She was given IV fluid hydration with resolution of her symptoms including nausea and vomiting. She is tolerating soft GI diet.   There was not clear-cut evidence of chronic pancreatitis and GI was consulted. GI recommended no IV pain medication and follow-up with outpatient gastroenterologist.  Patient keeps requesting pain medication. I have called patient's pain management provider. He recommended not giving any additional prescription to the patient. Now patient is complaining that her wound of recent skin graft area does not look good. She had instruction not to open the dressing till Monday. She has pulled down her dressing. I have looked at the wound. It does not look infected. We contacted plastic surgery commended contacting the original surgeon. Called her general surgeon office [Dr. Castillo Mandujano and I have left my cell phone number twice. I was informed that on-call surgeon will give me a call. I received call back from Dr. Catie Castaneda. Explained the situation to him. He informed me about patient instruction and patient is aware that she is not supposed to pull her dressing down. He recommended putting Xeroform dressing on the exposed area and she needs to follow-up with him on Monday. Her electrolytes were repleted on the day of discharge. Red flag signs discussed with the patient. Disposition: Home or Self Care [[already following with home health [place]]  Activity: PT/OT per Home Health  Diet: DIET GI SOFT No options chosen  Code Status: Full Code    Follow Up Orders:  No orders of the defined types were placed in this encounter. Follow-up Information     Follow up With Specialties Details Why Contact Info    Leeanna Ambrocio MD Internal Medicine In 1 week  [address]      Starr Gan MD Plastic Surgery  [facility] as schedule [address]  [phone]            Discharge meds at bottom of this note. Plan was discussed with patient and her grandmother. All questions answered.   Patient was stable at time of discharge. Given instructions to call a physician or return if any concerns. Discharge summary and encounter summary was sent to PCP electronically via \"Comm Mgt\" link in [facility], if possible. Diagnostic Imaging/Tests:   No results found. Echocardiogram results:  No results found for this visit on 03/17/21.     Procedures done this admission:  * No surgery found *    All Micro Results     None          SARS-CoV-2 Lab Results  \"Novel Coronavirus\" Test: No results found for: COV2NT   \"Emergent Disease\" Test: No results found for: EDPR  \"SARS-COV-2\" Test: No results found for: XGCOVT  \"Precision Labs\" Test: No results found for: RSLT  Rapid Test: No results found for: COVR         Labs: Results:       BMP, Mg, Phos Recent Labs     03/19/21 0541 03/18/21 0514 03/17/21 1958    142 139   K 3.1* 4.1 4.1    106 100   CO2 29 30 31   AGAP 6* 6* 8   BUN 7 11 10   CREA 0.86 1.10* 1.19*   CA 8.3 8.1* 8.8   GLU 77 134* 137*   MG 1.7* 1.8  --    PHOS 3.1  --   --       CBC Recent Labs     03/19/21 0541 03/18/21 0514 03/17/21 1958   WBC 10.2 13.9* 14.4*   RBC 3.46* 3.68* 4.01*   HGB 9.6* 9.9* 11.0*   HCT 31.3* 33.2* 35.5*    323 338   GRANS 71 84* 92*   LYMPH 21 12* 7*   EOS 0* 0* 0*   MONOS 6 4 1*   BASOS 0 0 0   IG 1 1 1   ANEU 7.3 11.7* 13.3*   ABL 2.2 1.6 1.0   JACQUE 0.0 0.0 0.0   ABM 0.7 0.5 0.1   ABB 0.0 0.0 0.0   AIG 0.1 0.1 0.1      LFT Recent Labs     03/17/21 1958   ALT 21   AP 71   TP 6.5   ALB 2.8*   GLOB 3.7*   AGRAT 0.8*      Cardiac Testing No results found for: BNPP, BNP, CPK, RCK1, RCK2, RCK3, RCK4, CKMB, CKNDX, CKND1, TROPT, TROIQ   Coagulation Tests Lab Results   Component Value Date/Time    Prothrombin time 10.2 10/14/2015 07:03 PM    Prothrombin time 10.3 12/05/2011 09:45 AM    INR 1.0 10/14/2015 07:03 PM    INR 1.0 12/05/2011 09:45 AM    aPTT 21.0 (L) 10/14/2015 07:03 PM    aPTT 24.3 (L) 12/05/2011 09:45 AM      A1c Lab Results   Component Value Date/Time    Hemoglobin A1c 5.9 01/16/2015 09:21 AM    Hemoglobin A1c 5.4 04/26/2012 09:30 AM              Most Recent UA Lab Results   Component Value Date/Time    Color YELLOW 01/12/2021 04:42 PM    Appearance CLEAR 01/12/2021 04:42 PM    Specific gravity 1.017 01/12/2021 04:42 PM    pH (UA) 7.5 01/12/2021 04:42 PM    Protein Negative 01/12/2021 04:42 PM    Glucose Negative 01/12/2021 04:42 PM    Ketone Negative 01/12/2021 04:42 PM    Bilirubin Negative 01/12/2021 04:42 PM    Blood Negative 01/12/2021 04:42 PM    Urobilinogen 0.2 01/12/2021 04:42 PM    Nitrites Negative 01/12/2021 04:42 PM    Leukocyte Esterase TRACE (A) 01/12/2021 04:42 PM    WBC 0-3 01/12/2021 04:42 PM    RBC 0-3 01/12/2021 04:42 PM    Epithelial cells 0-3 01/12/2021 04:42 PM    Bacteria 0 01/12/2021 04:42 PM    Casts 0-3 01/12/2021 04:42 PM    Crystals, urine 0 01/25/2016 05:05 PM    Mucus 0 01/25/2016 05:05 PM        Allergies   Allergen Reactions    Latex Anaphylaxis    Saint Rose Oil Anaphylaxis    [place] Nut Anaphylaxis    Peanut Anaphylaxis    Shellfish Containing Products Anaphylaxis    Beef Containing Products Other (comments)     Per allergy testing    Chicken Derived Other (comments)     Per allergy testing    Chlorpromazine Other (comments)     Dystonic      Clindamycin Rash    Dopamine Receptor Agonist Other (comments)     Dystonic      Gluten Other (comments)     Per allergy testing    Hydroxyzine Pamoate Other (comments)     DYSTONIC      Ibuprofen Rash and Photosensitivity    Iodine Rash    Naproxen Sodium Rash    Nubain [Nalbuphine] Rash    Other Medication Other (comments)     Bananas, avacodo, strawberry, potatoes  --- ALL PER ALLERGY TESTING    Pcn [Penicillins] Rash    Povidone-Iodine Rash    Prochlorperazine Edisylate Other (comments)     Dystonic      Promethazine Other (comments)     Dystonic      Reglan [Metoclopramide] Anaphylaxis     dystonia    Soy Other (comments)     Per allergy testing    Sulfa (Sulfonamide Antibiotics) Rash    Toradol [Ketorolac Tromethamine] Rash    Tramadol Rash    Zithromax [Azithromycin] Rash     Immunization History   Administered Date(s) Administered    TD Vaccine 05/07/2009       All Labs from Last 24 Hrs:  Recent Results (from the past 24 hour(s))   METABOLIC PANEL, BASIC    Collection Time: 03/19/21  5:41 AM   Result Value Ref Range    Sodium 142 136 - 145 mmol/L    Potassium 3.1 (L) 3.5 - 5.1 mmol/L    Chloride 107 98 - 107 mmol/L    CO2 29 21 - 32 mmol/L    Anion gap 6 (L) 7 - 16 mmol/L    Glucose 77 65 - 100 mg/dL    BUN 7 6 - 23 MG/DL    Creatinine 0.86 0.6 - 1.0 MG/DL    GFR est AA >60 >60 ml/min/1.73m2    GFR est non-AA >60 >60 ml/min/1.73m2    Calcium 8.3 8.3 - 10.4 MG/DL   CBC WITH AUTOMATED DIFF    Collection Time: 03/19/21  5:41 AM   Result Value Ref Range    WBC 10.2 4.3 - 11.1 K/uL    RBC 3.46 (L) 4.05 - 5.2 M/uL    HGB 9.6 (L) 11.7 - 15.4 g/dL    HCT 31.3 (L) 35.8 - 46.3 %    MCV 90.5 79.6 - 97.8 FL    MCH 27.7 26.1 - 32.9 PG    MCHC 30.7 (L) 31.4 - 35.0 g/dL    RDW 15.9 (H) 11.9 - 14.6 %    PLATELET 158 484 - 575 K/uL    MPV 9.4 9.4 - 12.3 FL    ABSOLUTE NRBC 0.00 0.0 - 0.2 K/uL    DF AUTOMATED      NEUTROPHILS 71 43 - 78 %    LYMPHOCYTES 21 13 - 44 %    MONOCYTES 6 4.0 - 12.0 %    EOSINOPHILS 0 (L) 0.5 - 7.8 %    BASOPHILS 0 0.0 - 2.0 %    IMMATURE GRANULOCYTES 1 0.0 - 5.0 %    ABS. NEUTROPHILS 7.3 1.7 - 8.2 K/UL    ABS. LYMPHOCYTES 2.2 0.5 - 4.6 K/UL    ABS. MONOCYTES 0.7 0.1 - 1.3 K/UL    ABS. EOSINOPHILS 0.0 0.0 - 0.8 K/UL    ABS. BASOPHILS 0.0 0.0 - 0.2 K/UL    ABS. IMM.  GRANS. 0.1 0.0 - 0.5 K/UL   MAGNESIUM    Collection Time: 03/19/21  5:41 AM   Result Value Ref Range    Magnesium 1.7 (L) 1.8 - 2.4 mg/dL   PHOSPHORUS    Collection Time: 03/19/21  5:41 AM   Result Value Ref Range    Phosphorus 3.1 2.5 - 4.5 MG/DL       Discharge Exam:  Patient Vitals for the past 24 hrs:   Temp Pulse Resp BP SpO2   03/19/21 1212 98.1 °F (36.7 °C) 97 16 134/78 94 %   03/19/21 0812 97.8 °F (36.6 °C) 89 16 137/85 98 %   03/19/21 0354 98.2 °F (36.8 °C) 87 16 112/69 98 %   03/18/21 2345 98.5 °F (36.9 °C) 75 18 (!) 150/88 98 %   03/18/21 2000 98.3 °F (36.8 °C) 97 18 134/84 98 %     Oxygen Therapy  O2 Sat (%): 94 % (03/19/21 1212)  O2 Device: Room air (03/19/21 0812)    Estimated body mass index is 34.3 kg/m² as calculated from the following:    Height as of this encounter: 5' 4\" (1.626 m). Weight as of this encounter: 90.6 kg (199 lb 12.8 oz). Intake/Output Summary (Last 24 hours) at 3/19/2021 1611  Last data filed at 3/19/2021 0549  Gross per 24 hour   Intake 200 ml   Output 2000 ml   Net -1800 ml       *Note that automatically entered I/Os may not be accurate; dependent on patient compliance with collection and accurate  by assistants. General:          BMI 34.3. Slight moonlike facies. AOx3. No acute distress. CV:                  RRR. No murmur, rub, or gallop. Lungs:             Is air entry bilateral lower lobe otherwise CTAB. No wheezing, rhonchi, or rales. Abdomen:        Soft, nontender, nondistended. Extremities:     Warm and dry. No cyanosis or edema. Skin:                No rashes or jaundice. Neuro:             No gross focal deficits  Dressing on left lower extremities and was not taken off on patient's request.  Patient has pulled her dressing little bit down. Close approximation sutures seen. Below suture line there is approximately 2x2 cm square neuro area without any signs of infection or inflammation.     Current Med List in Hospital:   Current Facility-Administered Medications   Medication Dose Route Frequency    lipase-protease-amylase (ZENPEP 5,000) capsule 1 Cap  1 Cap Oral TID WITH MEALS    hydrocortisone (CORTEF) tablet 10 mg  10 mg Oral TID    oxyCODONE IR (ROXICODONE) tablet 5 mg  5 mg Oral Q8H PRN    labetaloL (NORMODYNE;TRANDATE) injection 10 mg  10 mg IntraVENous Q4H PRN    potassium chloride (KLOR-CON) tablet 10 mEq  10 mEq Oral DAILY    albuterol CONCENTRATE 2.5mg/0.5 mL neb soln  2.5 mg Inhalation Q4H PRN    calcium carbonate (OS-KEITH) tablet 500 mg [elemental]  500 mg Oral TID WITH MEALS    [Held by provider] furosemide (LASIX) tablet 40 mg  40 mg Oral DAILY    [Held by provider] hydrocortisone (CORTEF) tablet 5 mg  5 mg Oral QHS    montelukast (SINGULAIR) tablet 10 mg  10 mg Oral 7am    pantoprazole (PROTONIX) tablet 40 mg  40 mg Oral ACB    oxyCODONE ER (OxyCONTIN) tablet 30 mg  30 mg Oral Q12H    sodium chloride (NS) flush 5-40 mL  5-40 mL IntraVENous Q8H    sodium chloride (NS) flush 5-40 mL  5-40 mL IntraVENous PRN    acetaminophen (TYLENOL) tablet 650 mg  650 mg Oral Q6H PRN    Or    acetaminophen (TYLENOL) suppository 650 mg  650 mg Rectal Q6H PRN    enoxaparin (LOVENOX) injection 40 mg  40 mg SubCUTAneous DAILY    ondansetron (ZOFRAN) injection 4 mg  4 mg IntraVENous Q4H PRN       Discharge Info:   Current Discharge Medication List      CONTINUE these medications which have NOT CHANGED    Details   morphine IR (MS IR) 15 mg tablet Take 15 mg by mouth every eight (8) hours as needed for Pain. cephALEXin (Keflex) 500 mg capsule Take 500 mg by mouth two (2) times a day. metoprolol succinate (TOPROL-XL) 25 mg XL tablet Take 25 mg by mouth daily. alendronate (FOSAMAX) 70 mg tablet Take 70 mg by mouth every seven (7) days. cyanocobalamin (VITAMIN B12) 100 mcg tablet Take 2,500 mcg by mouth daily. !! hydrocortisone (CORTEF) 10 mg tablet Take 10 mg by mouth every morning. !! hydrocortisone (CORTEF) 5 mg tablet Take 5 mg by mouth nightly. ESTRADIOL PO Take  by mouth daily. B.infantis-B.ani-B.long-B.bifi (PROBIOTIC 4X) 10-15 mg TbEC Take 1 Tab by mouth every morning. furosemide (LASIX) 40 mg tablet Take 40 mg by mouth daily. Potassium Citrate 15 mEq TbER Take 15 mEq by mouth two (2) times a day.       lipase-protease-amylase (ZENPEP) capsule Take 1 Cap by mouth Before breakfast, lunch, dinner and at bedtime. calcium citrate 200 mg (950 mg) tablet Take 200 mg by mouth three (3) times daily. oxyCODONE 30 mg TR12 Take 30 mg by mouth every twelve (12) hours. Indications: chronic pain      omeprazole (PRILOSEC) 20 mg capsule Take 20 mg by mouth two (2) times a day. montelukast (SINGULAIR) 10 mg tablet Take 10 mg by mouth every morning. albuterol (PROVENTIL, VENTOLIN) 90 mcg/Actuation inhaler Take 2 Puffs by inhalation as needed. ZOFRAN 8 mg Tab Take 8 mg by mouth as needed. Epinephrine 0.15 mg/0.15 mL PnIj by IntraMUSCular route as needed. Epi-pen as needed for allergic reaction       !! - Potential duplicate medications found. Please discuss with provider. Time spent in patient discharge planning and coordination 35 minutes.     Signed:  Scott Brown MD

## 2021-03-19 NOTE — PROGRESS NOTES
Discharge instructions reviewed with Pt. And Pt.s mom. Pt voiced understanding of all discharge instructions. IV's taken out.

## 2021-03-19 NOTE — PROGRESS NOTES
Called Dr. Ijeoma Phillips about plastic surgery consult. Dr. Ijeoma Phillips told this RN to call the  that did the plastic surgery.  Will try to contact Dr. Ramin Mary did plastic surgery

## 2021-03-20 NOTE — PROGRESS NOTES
Care Management Interventions  PCP Verified by CM: Yes(Ang Estrada Ra)  Mode of Transport at Discharge: Other (see comment)(Family)  Transition of Care Consult (CM Consult): [address]: No  Reason Outside [place]: Patient already serviced by other home care/hospice agency  MyChart Signup: No  Discharge Durable Medical Equipment: No  Physical Therapy Consult: No  Occupational Therapy Consult: No  Speech Therapy Consult: No  Current Support Network: Own Home, Family Lives Nearby  Confirm Follow Up Transport: Family  The Patient and/or Patient Representative was Provided with a Choice of Provider and Agrees with the Discharge Plan?: Yes  Freedom of Choice List was Provided with Basic Dialogue that Supports the Patient's Individualized Plan of Care/Goals, Treatment Preferences and Shares the Quality Data Associated with the Providers?: Yes  Discharge Location  Discharge Placement: Home with home health  Faxed orders to [facility] and called to [address]

## 2021-06-10 ENCOUNTER — HOSPITAL ENCOUNTER (EMERGENCY)
Age: 41
Discharge: HOME | End: 2021-06-10
Attending: EMERGENCY MEDICINE
Payer: COMMERCIAL

## 2021-06-10 ENCOUNTER — APPOINTMENT (OUTPATIENT)
Dept: GENERAL RADIOLOGY | Age: 41
End: 2021-06-10
Attending: PHYSICIAN ASSISTANT
Payer: COMMERCIAL

## 2021-06-10 VITALS
WEIGHT: 195 LBS | BODY MASS INDEX: 33.29 KG/M2 | HEIGHT: 64 IN | RESPIRATION RATE: 16 BRPM | TEMPERATURE: 98.5 F | DIASTOLIC BLOOD PRESSURE: 76 MMHG | HEART RATE: 78 BPM | SYSTOLIC BLOOD PRESSURE: 142 MMHG | OXYGEN SATURATION: 95 %

## 2021-06-10 DIAGNOSIS — R11.2 NAUSEA AND VOMITING, INTRACTABILITY OF VOMITING NOT SPECIFIED, UNSPECIFIED VOMITING TYPE: Primary | ICD-10-CM

## 2021-06-10 LAB
ALBUMIN SERPL-MCNC: 2.7 G/DL (ref 3.5–5)
ALBUMIN/GLOB SERPL: 0.8 {RATIO} (ref 1.2–3.5)
ALP SERPL-CCNC: 60 U/L (ref 50–136)
ALT SERPL-CCNC: 19 U/L (ref 12–65)
ANION GAP SERPL CALC-SCNC: 10 MMOL/L (ref 7–16)
AST SERPL-CCNC: 20 U/L (ref 15–37)
BASOPHILS # BLD: 0 K/UL (ref 0–0.2)
BASOPHILS NFR BLD: 0 % (ref 0–2)
BILIRUB SERPL-MCNC: 0.3 MG/DL (ref 0.2–1.1)
BUN SERPL-MCNC: 10 MG/DL (ref 6–23)
CALCIUM SERPL-MCNC: 8.9 MG/DL (ref 8.3–10.4)
CHLORIDE SERPL-SCNC: 99 MMOL/L (ref 98–107)
CO2 SERPL-SCNC: 29 MMOL/L (ref 21–32)
CREAT SERPL-MCNC: 1.1 MG/DL (ref 0.6–1)
DIFFERENTIAL METHOD BLD: ABNORMAL
EOSINOPHIL # BLD: 0 K/UL (ref 0–0.8)
EOSINOPHIL NFR BLD: 0 % (ref 0.5–7.8)
ERYTHROCYTE [DISTWIDTH] IN BLOOD BY AUTOMATED COUNT: 15.4 % (ref 11.9–14.6)
GLOBULIN SER CALC-MCNC: 3.6 G/DL (ref 2.3–3.5)
GLUCOSE SERPL-MCNC: 140 MG/DL (ref 65–100)
HCG UR QL: NEGATIVE
HCT VFR BLD AUTO: 33.3 % (ref 35.8–46.3)
HGB BLD-MCNC: 10.5 G/DL (ref 11.7–15.4)
IMM GRANULOCYTES # BLD AUTO: 0.1 K/UL (ref 0–0.5)
IMM GRANULOCYTES NFR BLD AUTO: 0 % (ref 0–5)
LIPASE SERPL-CCNC: 54 U/L (ref 73–393)
LYMPHOCYTES # BLD: 1 K/UL (ref 0.5–4.6)
LYMPHOCYTES NFR BLD: 8 % (ref 13–44)
MCH RBC QN AUTO: 27.2 PG (ref 26.1–32.9)
MCHC RBC AUTO-ENTMCNC: 31.5 G/DL (ref 31.4–35)
MCV RBC AUTO: 86.3 FL (ref 79.6–97.8)
MONOCYTES # BLD: 0.2 K/UL (ref 0.1–1.3)
MONOCYTES NFR BLD: 2 % (ref 4–12)
NEUTS SEG # BLD: 11.3 K/UL (ref 1.7–8.2)
NEUTS SEG NFR BLD: 90 % (ref 43–78)
NRBC # BLD: 0 K/UL (ref 0–0.2)
PLATELET # BLD AUTO: 276 K/UL (ref 150–450)
PMV BLD AUTO: 9.5 FL (ref 9.4–12.3)
POTASSIUM SERPL-SCNC: 3.2 MMOL/L (ref 3.5–5.1)
PROT SERPL-MCNC: 6.3 G/DL (ref 6.3–8.2)
RBC # BLD AUTO: 3.86 M/UL (ref 4.05–5.2)
SODIUM SERPL-SCNC: 138 MMOL/L (ref 136–145)
WBC # BLD AUTO: 12.6 K/UL (ref 4.3–11.1)

## 2021-06-10 PROCEDURE — 74011250637 HC RX REV CODE- 250/637: Performed by: PHYSICIAN ASSISTANT

## 2021-06-10 PROCEDURE — 81025 URINE PREGNANCY TEST: CPT

## 2021-06-10 PROCEDURE — 96361 HYDRATE IV INFUSION ADD-ON: CPT

## 2021-06-10 PROCEDURE — 83690 ASSAY OF LIPASE: CPT

## 2021-06-10 PROCEDURE — 96375 TX/PRO/DX INJ NEW DRUG ADDON: CPT

## 2021-06-10 PROCEDURE — 99284 EMERGENCY DEPT VISIT MOD MDM: CPT

## 2021-06-10 PROCEDURE — 74011250636 HC RX REV CODE- 250/636: Performed by: PHYSICIAN ASSISTANT

## 2021-06-10 PROCEDURE — 96374 THER/PROPH/DIAG INJ IV PUSH: CPT

## 2021-06-10 PROCEDURE — 85025 COMPLETE CBC W/AUTO DIFF WBC: CPT

## 2021-06-10 PROCEDURE — 74022 RADEX COMPL AQT ABD SERIES: CPT

## 2021-06-10 PROCEDURE — 81003 URINALYSIS AUTO W/O SCOPE: CPT

## 2021-06-10 PROCEDURE — 80053 COMPREHEN METABOLIC PANEL: CPT

## 2021-06-10 RX ORDER — SODIUM CHLORIDE 0.9 % (FLUSH) 0.9 %
5-10 SYRINGE (ML) INJECTION EVERY 8 HOURS
Status: DISCONTINUED | OUTPATIENT
Start: 2021-06-10 | End: 2021-06-10 | Stop reason: HOSPADM

## 2021-06-10 RX ORDER — SODIUM CHLORIDE 0.9 % (FLUSH) 0.9 %
5-10 SYRINGE (ML) INJECTION AS NEEDED
Status: DISCONTINUED | OUTPATIENT
Start: 2021-06-10 | End: 2021-06-10 | Stop reason: HOSPADM

## 2021-06-10 RX ORDER — ONDANSETRON 2 MG/ML
4 INJECTION INTRAMUSCULAR; INTRAVENOUS
Status: COMPLETED | OUTPATIENT
Start: 2021-06-10 | End: 2021-06-10

## 2021-06-10 RX ORDER — MORPHINE SULFATE 4 MG/ML
4 INJECTION INTRAVENOUS
Status: COMPLETED | OUTPATIENT
Start: 2021-06-10 | End: 2021-06-10

## 2021-06-10 RX ORDER — MORPHINE SULFATE 15 MG/1
15 TABLET ORAL
Status: COMPLETED | OUTPATIENT
Start: 2021-06-10 | End: 2021-06-10

## 2021-06-10 RX ADMIN — MORPHINE SULFATE 4 MG: 4 INJECTION INTRAVENOUS at 13:13

## 2021-06-10 RX ADMIN — ONDANSETRON 4 MG: 2 INJECTION INTRAMUSCULAR; INTRAVENOUS at 13:12

## 2021-06-10 RX ADMIN — SODIUM CHLORIDE 1000 ML: 900 INJECTION, SOLUTION INTRAVENOUS at 13:11

## 2021-06-10 RX ADMIN — MORPHINE SULFATE 15 MG: 15 TABLET ORAL at 14:34

## 2021-06-10 NOTE — ED NOTES
I have reviewed discharge instructions with the patient. The patient verbalized understanding. Patient left ED via Discharge Method: wheelchair to Home with family. Opportunity for questions and clarification provided. Patient given 0 scripts. To continue your aftercare when you leave the hospital, you may receive an automated call from our care team to check in on how you are doing. This is a free service and part of our promise to provide the best care and service to meet your aftercare needs.  If you have questions, or wish to unsubscribe from this service please call [phone]. Thank you for Choosing our [facility] Emergency Department.

## 2021-06-10 NOTE — ED TRIAGE NOTES
Pt states she has been vomiting since this morning after taking medication. States hx of pancreatitis and feels similar. States abd pain and distention also. Hx of igor's disease. Masked for triage.

## 2021-06-10 NOTE — ED PROVIDER NOTES
Patient to ER complaining of nausea vomiting after GI procedure this am.  Apparently she was at [facility] regional had some type of EGD done and on the way home took her home medications on empty stomach and threw up in the car. She also states she has a history of pancreatitis and feel it may be her pancreas. Patient with history of chronic pain on p.o. morphine    The history is provided by the patient. Vomiting   This is a new problem. The current episode started less than 1 hour ago. The problem occurs 2 to 4 times per day. The problem has not changed since onset. The emesis has an appearance of stomach contents. There has been no fever. Associated symptoms include abdominal pain. The patient is not pregnant. Her pertinent negatives include no irritable bowel syndrome, no short gut syndrome, no recent abdominal surgery and no gastric bypass.         Past Medical History:   Diagnosis Date    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    Arthritis     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    Asthma     uses MDI as needed - rarely-pt can't remember last time needed    Avascular necrosis of femur head     right hip, left hip replacements    Chronic pain     Endocrine disease diagnosed 2003    ADDISONS DISEASE    Endometriosis     hysterectomy    GERD (gastroesophageal reflux disease)     Infectious disease 2007    MRSA-2007, community acquired - in hospital [facility]) about 6 months - 3 months of which was in coma;  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     Nausea & vomiting     Other ill-defined conditions(799.89)     allergies    Pancreatitis chronic     7 episodes    Unspecified adverse effect of anesthesia     pt reports \"difficult to sedate       Past Surgical History:   Procedure Laterality Date    HX APPENDECTOMY      HX CHOLECYSTECTOMY      HX GYN      hysterectomy    HX ORTHOPAEDIC  2010    right EDIN    HX ORTHOPAEDIC  2010    Left  EDIN    HX OTHER SURGICAL  2007    I&D of 9 abcesses -MRSA  HX SHOULDER REPLACEMENT  12/2011    Right    HX SHOULDER REPLACEMENT  2015    left    HX TONSILLECTOMY           Family History:   Problem Relation Age of Onset    Cancer Mother         THROID CANCER    Diabetes Maternal Grandmother     Stroke Maternal Grandfather     Heart Disease Paternal Grandmother        Social History     Socioeconomic History    Marital status: SINGLE     Spouse name: Not on file    Number of children: Not on file    Years of education: Not on file    Highest education level: Not on file   Occupational History    Not on file   Tobacco Use    Smoking status: Never Smoker    Smokeless tobacco: Never Used   Substance and Sexual Activity    Alcohol use: No    Drug use: No    Sexual activity: Not Currently   Other Topics Concern    Not on file   Social History Narrative    Not on file     Social Determinants of Health     Financial Resource Strain:     Difficulty of Paying Living Expenses:    Food Insecurity:     Worried About Running Out of Food in the Last Year:     Ran Out of Food in the Last Year:    Transportation Needs:     Lack of Transportation (Medical):  Lack of Transportation (Non-Medical):    Physical Activity:     Days of Exercise per Week:     Minutes of Exercise per Session:    Stress:     Feeling of Stress :    Social Connections:     Frequency of Communication with Friends and Family:     Frequency of Social Gatherings with Friends and Family:     Attends Pentecostal Services:     Active Member of Clubs or Organizations:     Attends Club or Organization Meetings:     Marital Status:    Intimate Partner Violence:     Fear of Current or Ex-Partner:     Emotionally Abused:     Physically Abused:     Sexually Abused:           ALLERGIES: Latex, [place] oil, Brazil nut, Peanut, Shellfish containing products, Beef containing products, Chicken derived, Chlorpromazine, Clindamycin, Dopamine receptor agonist, Gluten, Hydroxyzine pamoate, Ibuprofen, Iodine, Naproxen sodium, Nubain [nalbuphine], Other medication, Pcn [penicillins], Povidone-iodine, Prochlorperazine edisylate, Promethazine, Reglan [metoclopramide], Soy, Sulfa (sulfonamide antibiotics), Toradol [ketorolac tromethamine], Tramadol, and Zithromax [azithromycin]    Review of Systems   Gastrointestinal: Positive for abdominal pain and vomiting. All other systems reviewed and are negative. Vitals:    06/10/21 1159 06/10/21 1300 06/10/21 1338   BP: 118/83 122/76 130/83   Pulse: 95 87 93   Resp: 16     Temp: 98.5 °F (36.9 °C)     SpO2: 94% 95% 97%   Weight: 88.5 kg (195 lb)     Height: 5' 4\" (1.626 m)              Physical Exam  Vitals and nursing note reviewed. Constitutional:       General: She is not in acute distress. Appearance: Normal appearance. She is well-developed. She is obese. She is not diaphoretic. HENT:      Head: Normocephalic and atraumatic. Nose: Nose normal.   Eyes:      Pupils: Pupils are equal, round, and reactive to light. Cardiovascular:      Rate and Rhythm: Normal rate and regular rhythm. Pulmonary:      Effort: Pulmonary effort is normal.      Breath sounds: Normal breath sounds. Abdominal:      General: Abdomen is flat. Bowel sounds are normal.      Palpations: Abdomen is soft. There is no mass. Tenderness: There is abdominal tenderness. Comments: Diffuse  upper abdominal pain to palpation no masses positive bowel sounds are noted   Musculoskeletal:         General: Normal range of motion. Cervical back: Normal range of motion and neck supple. Skin:     General: Skin is warm. Neurological:      General: No focal deficit present. Mental Status: She is alert and oriented to person, place, and time.           MDM  Number of Diagnoses or Management Options  Diagnosis management comments: Urine dip negative CBC CMP lipase normal patient given 4 mg of morphine IV 1 L normal saline 4 mg Zofran IV she continued to have pain but  no vomiting  XR ABD ACUTE W 1 V CHEST   Final Result    No acute findings. Was given single dose p.o. morphine that she normally takes at home 15 mg.   No vomiting was noted patient was discharged to home in stable condition to continue all at home medications see her primary care physician for routine recheck       Amount and/or Complexity of Data Reviewed  Clinical lab tests: ordered and reviewed  Tests in the radiology section of CPT®: ordered and reviewed  Review and summarize past medical records: yes  Discuss the patient with other providers: yes    Risk of Complications, Morbidity, and/or Mortality  Presenting problems: moderate  Diagnostic procedures: moderate  Management options: moderate    Patient Progress  Patient progress: improved         Procedures

## 2022-01-25 ENCOUNTER — HOSPITAL ENCOUNTER (OUTPATIENT)
Dept: SURGERY | Age: 42
Discharge: HOME | End: 2022-01-25

## 2022-03-18 PROBLEM — S81.802A WOUND OF LEFT LEG: Status: ACTIVE | Noted: 2021-03-19

## 2022-03-19 PROBLEM — R10.9 CHRONIC ABDOMINAL PAIN: Status: ACTIVE | Noted: 2020-05-22

## 2022-03-19 PROBLEM — G89.29 CHRONIC ABDOMINAL PAIN: Status: ACTIVE | Noted: 2020-05-22

## 2022-03-22 ENCOUNTER — HOSPITAL ENCOUNTER (EMERGENCY)
Age: 42
Discharge: HOME | End: 2022-03-22
Attending: EMERGENCY MEDICINE
Payer: COMMERCIAL

## 2022-03-22 ENCOUNTER — APPOINTMENT (OUTPATIENT)
Dept: GENERAL RADIOLOGY | Age: 42
End: 2022-03-22
Attending: EMERGENCY MEDICINE
Payer: COMMERCIAL

## 2022-03-22 VITALS
RESPIRATION RATE: 18 BRPM | HEART RATE: 86 BPM | BODY MASS INDEX: 29.02 KG/M2 | HEIGHT: 64 IN | TEMPERATURE: 98.1 F | WEIGHT: 170 LBS | OXYGEN SATURATION: 98 % | SYSTOLIC BLOOD PRESSURE: 130 MMHG | DIASTOLIC BLOOD PRESSURE: 88 MMHG

## 2022-03-22 DIAGNOSIS — S40.012A CONTUSION OF LEFT SHOULDER, INITIAL ENCOUNTER: Primary | ICD-10-CM

## 2022-03-22 DIAGNOSIS — G89.29 ABDOMINAL PAIN, CHRONIC, EPIGASTRIC: ICD-10-CM

## 2022-03-22 DIAGNOSIS — R10.13 ABDOMINAL PAIN, CHRONIC, EPIGASTRIC: ICD-10-CM

## 2022-03-22 LAB
ALBUMIN SERPL-MCNC: 3 G/DL (ref 3.5–5)
ALBUMIN/GLOB SERPL: 0.6 {RATIO} (ref 1.2–3.5)
ALP SERPL-CCNC: 79 U/L (ref 50–136)
ALT SERPL-CCNC: 31 U/L (ref 12–65)
ANION GAP SERPL CALC-SCNC: 8 MMOL/L (ref 7–16)
AST SERPL-CCNC: 43 U/L (ref 15–37)
BASOPHILS # BLD: 0 K/UL (ref 0–0.2)
BASOPHILS NFR BLD: 0 % (ref 0–2)
BILIRUB SERPL-MCNC: 0.5 MG/DL (ref 0.2–1.1)
BUN SERPL-MCNC: 9 MG/DL (ref 6–23)
CALCIUM SERPL-MCNC: 9.3 MG/DL (ref 8.3–10.4)
CHLORIDE SERPL-SCNC: 96 MMOL/L (ref 98–107)
CO2 SERPL-SCNC: 32 MMOL/L (ref 21–32)
CREAT SERPL-MCNC: 1.45 MG/DL (ref 0.6–1)
DIFFERENTIAL METHOD BLD: ABNORMAL
EOSINOPHIL # BLD: 0.1 K/UL (ref 0–0.8)
EOSINOPHIL NFR BLD: 1 % (ref 0.5–7.8)
ERYTHROCYTE [DISTWIDTH] IN BLOOD BY AUTOMATED COUNT: 14.6 % (ref 11.9–14.6)
GLOBULIN SER CALC-MCNC: 4.7 G/DL (ref 2.3–3.5)
GLUCOSE SERPL-MCNC: 96 MG/DL (ref 65–100)
HCT VFR BLD AUTO: 41.6 % (ref 35.8–46.3)
HGB BLD-MCNC: 12.6 G/DL (ref 11.7–15.4)
IMM GRANULOCYTES # BLD AUTO: 0.1 K/UL (ref 0–0.5)
IMM GRANULOCYTES NFR BLD AUTO: 0 % (ref 0–5)
LIPASE SERPL-CCNC: 57 U/L (ref 73–393)
LYMPHOCYTES # BLD: 2.2 K/UL (ref 0.5–4.6)
LYMPHOCYTES NFR BLD: 17 % (ref 13–44)
MCH RBC QN AUTO: 25.3 PG (ref 26.1–32.9)
MCHC RBC AUTO-ENTMCNC: 30.3 G/DL (ref 31.4–35)
MCV RBC AUTO: 83.4 FL (ref 79.6–97.8)
MONOCYTES # BLD: 0.7 K/UL (ref 0.1–1.3)
MONOCYTES NFR BLD: 6 % (ref 4–12)
NEUTS SEG # BLD: 10.3 K/UL (ref 1.7–8.2)
NEUTS SEG NFR BLD: 77 % (ref 43–78)
NRBC # BLD: 0 K/UL (ref 0–0.2)
PLATELET # BLD AUTO: 386 K/UL (ref 150–450)
PMV BLD AUTO: 9.8 FL (ref 9.4–12.3)
POTASSIUM SERPL-SCNC: 5 MMOL/L (ref 3.5–5.1)
PROT SERPL-MCNC: 7.7 G/DL (ref 6.3–8.2)
RBC # BLD AUTO: 4.99 M/UL (ref 4.05–5.2)
SODIUM SERPL-SCNC: 136 MMOL/L (ref 136–145)
WBC # BLD AUTO: 13.4 K/UL (ref 4.3–11.1)

## 2022-03-22 PROCEDURE — 96361 HYDRATE IV INFUSION ADD-ON: CPT

## 2022-03-22 PROCEDURE — 81003 URINALYSIS AUTO W/O SCOPE: CPT

## 2022-03-22 PROCEDURE — 96376 TX/PRO/DX INJ SAME DRUG ADON: CPT

## 2022-03-22 PROCEDURE — 73030 X-RAY EXAM OF SHOULDER: CPT

## 2022-03-22 PROCEDURE — 80053 COMPREHEN METABOLIC PANEL: CPT

## 2022-03-22 PROCEDURE — 96375 TX/PRO/DX INJ NEW DRUG ADDON: CPT

## 2022-03-22 PROCEDURE — 74011250636 HC RX REV CODE- 250/636: Performed by: EMERGENCY MEDICINE

## 2022-03-22 PROCEDURE — 83690 ASSAY OF LIPASE: CPT

## 2022-03-22 PROCEDURE — 96374 THER/PROPH/DIAG INJ IV PUSH: CPT

## 2022-03-22 PROCEDURE — 99284 EMERGENCY DEPT VISIT MOD MDM: CPT

## 2022-03-22 PROCEDURE — 74011000250 HC RX REV CODE- 250: Performed by: EMERGENCY MEDICINE

## 2022-03-22 PROCEDURE — 85025 COMPLETE CBC W/AUTO DIFF WBC: CPT

## 2022-03-22 RX ORDER — SODIUM CHLORIDE, SODIUM LACTATE, POTASSIUM CHLORIDE, CALCIUM CHLORIDE 600; 310; 30; 20 MG/100ML; MG/100ML; MG/100ML; MG/100ML
1000 INJECTION, SOLUTION INTRAVENOUS ONCE
Status: COMPLETED | OUTPATIENT
Start: 2022-03-22 | End: 2022-03-22

## 2022-03-22 RX ORDER — ONDANSETRON 2 MG/ML
4 INJECTION INTRAMUSCULAR; INTRAVENOUS
Status: COMPLETED | OUTPATIENT
Start: 2022-03-22 | End: 2022-03-22

## 2022-03-22 RX ORDER — ONDANSETRON 2 MG/ML
8 INJECTION INTRAMUSCULAR; INTRAVENOUS
Status: COMPLETED | OUTPATIENT
Start: 2022-03-22 | End: 2022-03-22

## 2022-03-22 RX ORDER — MORPHINE SULFATE 10 MG/ML
10 INJECTION, SOLUTION INTRAMUSCULAR; INTRAVENOUS
Status: COMPLETED | OUTPATIENT
Start: 2022-03-22 | End: 2022-03-22

## 2022-03-22 RX ORDER — MORPHINE SULFATE 4 MG/ML
4 INJECTION INTRAVENOUS
Status: DISCONTINUED | OUTPATIENT
Start: 2022-03-22 | End: 2022-03-22

## 2022-03-22 RX ORDER — FAMOTIDINE 10 MG/ML
20 INJECTION INTRAVENOUS
Status: DISCONTINUED | OUTPATIENT
Start: 2022-03-22 | End: 2022-03-22 | Stop reason: SDUPTHER

## 2022-03-22 RX ORDER — ONDANSETRON 2 MG/ML
8 INJECTION INTRAMUSCULAR; INTRAVENOUS
Status: DISCONTINUED | OUTPATIENT
Start: 2022-03-22 | End: 2022-03-22 | Stop reason: SDUPTHER

## 2022-03-22 RX ORDER — HYDROCORTISONE SODIUM SUCCINATE 100 MG/2ML
100 INJECTION, POWDER, FOR SOLUTION INTRAMUSCULAR; INTRAVENOUS
Status: COMPLETED | OUTPATIENT
Start: 2022-03-22 | End: 2022-03-22

## 2022-03-22 RX ADMIN — SODIUM CHLORIDE, SODIUM LACTATE, POTASSIUM CHLORIDE, AND CALCIUM CHLORIDE 1000 ML: .6; .31; .03; .02 INJECTION, SOLUTION INTRAVENOUS at 11:37

## 2022-03-22 RX ADMIN — ONDANSETRON 8 MG: 2 INJECTION INTRAMUSCULAR; INTRAVENOUS at 11:37

## 2022-03-22 RX ADMIN — MORPHINE SULFATE 10 MG: 10 INJECTION INTRAVENOUS at 12:56

## 2022-03-22 RX ADMIN — ONDANSETRON 4 MG: 2 INJECTION INTRAMUSCULAR; INTRAVENOUS at 12:56

## 2022-03-22 RX ADMIN — HYDROCORTISONE SODIUM SUCCINATE 100 MG: 100 INJECTION, POWDER, FOR SOLUTION INTRAMUSCULAR; INTRAVENOUS at 12:57

## 2022-03-22 RX ADMIN — SODIUM CHLORIDE, PRESERVATIVE FREE 20 MG: 5 INJECTION INTRAVENOUS at 12:56

## 2022-03-22 NOTE — DISCHARGE INSTRUCTIONS
Continue all your home medications  Call your doctors later today or tomorrow to schedule follow-up appointment  Avoid any heavy lifting with your left arm  Apply ice for swelling and soreness  Call your orthopedic doctor for follow-up    Return to ER for any worsening symptoms or new problems which may arise

## 2022-03-22 NOTE — ED TRIAGE NOTES
Pt states she ran into the door this morning and is having let shoulder pain. Reports she is having shoulder replacement surgery on May 16th. Pt  States she also is having ongoing stomach pain in diarrhea from her recent hospitalization for pancreatitis.

## 2022-03-22 NOTE — ED PROVIDER NOTES
76-year-old female patient well-known to me. Presents to the ER with complaints of left shoulder pain and upper abdominal pain. Patient states that she has had a history of chronic shoulder problems has had surgery several times in the past and is due to have a revision surgery. States that earlier today she accidentally struck her shoulder while walking and has had increasing pain    Patient's other complaint is upper abdominal pain. States that she was recently admitted with pancreatitis. She has a history of chronic pancreatitis  She complains of nausea vomiting and inability to tolerate her medications. Patient reports that oftentimes her lab work for her pancreatitis is not elevated. I reviewed her last admission where it did show that her lipase did elevate although not dramatically, when she was admitted overnight    Patient is a chronic pain patient is well and takes fairly high doses of narcotics on a daily basis    The history is provided by the patient and a relative. Shoulder Pain   The incident occurred 3 to 5 hours ago. The incident occurred at home. The injury mechanism was a direct blow. The left shoulder is affected. The pain is severe. The pain has been constant since onset. She has no other injuries. There is a history of shoulder surgery. She reports no foreign bodies present. Abdominal Pain   This is a chronic problem. The current episode started more than 1 week ago (Patient reports that her first episode of pancreatitis was in 1997. States that she is had pain ever since that time). The problem occurs constantly. The pain is associated with vomiting. The pain is located in the epigastric region. The quality of the pain is cramping and aching. The pain is moderate. Associated symptoms include nausea, vomiting and arthralgias. Pertinent negatives include no fever, no diarrhea, no constipation, no dysuria, no frequency, no hematuria, no headaches, no myalgias and no chest pain.  The pain is worsened by eating. The pain is relieved by nothing. Her past medical history is significant for pancreatitis. The patient's surgical history includes appendectomy and cholecystectomy.        Past Medical History:   Diagnosis Date    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    Arthritis     Aseptic necrosis of head of humerus (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     Nausea & vomiting     Other ill-defined conditions(799.89)     allergies    Pancreatitis chronic     7 episodes    Unspecified adverse effect of anesthesia     pt reports \"difficult to sedate       Past Surgical History:   Procedure Laterality Date    HX APPENDECTOMY      HX CHOLECYSTECTOMY      HX GYN      hysterectomy    HX ORTHOPAEDIC  2010    right EDIN    HX ORTHOPAEDIC  2010    Left  EDIN    HX OTHER SURGICAL  2007    I&D of 9 abcesses -MRSA    HX SHOULDER REPLACEMENT  12/2011    Right    HX SHOULDER REPLACEMENT  2015    left    HX TONSILLECTOMY           Family History:   Problem Relation Age of Onset    Cancer Mother         THROID CANCER    Diabetes Maternal Grandmother     Stroke Maternal Grandfather     Heart Disease Paternal Grandmother        Social History     Socioeconomic History    Marital status: SINGLE     Spouse name: Not on file    Number of children: Not on file    Years of education: Not on file    Highest education level: Not on file   Occupational History    Not on file   Tobacco Use    Smoking status: Never Smoker    Smokeless tobacco: Never Used   Substance and Sexual Activity    Alcohol use: No    Drug use: No    Sexual activity: Not Currently   Other Topics Concern    Not on file   Social History Narrative    Not on file     Social Determinants of Health     Financial Resource Strain:     Difficulty of Paying Living Expenses: Not on file   Food Insecurity:     Worried About Running Out of Food in the Last Year: Not on file    Hussein of Food in the Last Year: Not on file   Transportation Needs:     Lack of Transportation (Medical): Not on file    Lack of Transportation (Non-Medical):  Not on file   Physical Activity:     Days of Exercise per Week: Not on file    Minutes of Exercise per Session: Not on file   Stress:     Feeling of Stress : Not on file   Social Connections:     Frequency of Communication with Friends and Family: Not on file    Frequency of Social Gatherings with Friends and Family: Not on file    Attends Alevism Services: Not on file    Active Member of [employer] or Organizations: Not on file    Attends Club or Organization Meetings: Not on file    Marital Status: Not on file   Intimate Partner Violence:     Fear of Current or Ex-Partner: Not on file    Emotionally Abused: Not on file    Physically Abused: Not on file    Sexually Abused: Not on file   Housing Stability:     Unable to Pay for Housing in the Last Year: Not on file    Number of [place] in the Last Year: Not on file    Unstable Housing in the Last Year: Not on file         ALLERGIES: Latex, [place] oil, Brazil nut, Peanut, Shellfish containing products, Beef containing products, Chicken derived, Chlorpromazine, Clindamycin, Dopamine receptor agonist, Gluten, Hydroxyzine pamoate, Ibuprofen, Iodine, Naproxen sodium, Nubain [nalbuphine], Other medication, Pcn [penicillins], Povidone-iodine, Prochlorperazine edisylate, Promethazine, Reglan [metoclopramide], Soy, Sulfa (sulfonamide antibiotics), Toradol [ketorolac tromethamine], Tramadol, and Zithromax [azithromycin]    Review of Systems   Constitutional: Negative for chills and fever. HENT: Negative for congestion, ear pain and rhinorrhea. Eyes: Negative for photophobia and discharge. Respiratory: Negative for cough and shortness of breath. Cardiovascular: Negative for chest pain and palpitations. Gastrointestinal: Positive for abdominal pain, nausea and vomiting. Negative for constipation and diarrhea. Endocrine: Negative for cold intolerance and heat intolerance. Genitourinary: Negative for dysuria, flank pain, frequency and hematuria. Musculoskeletal: Positive for arthralgias. Negative for myalgias and neck pain. Skin: Negative for rash and wound. Allergic/Immunologic: Negative for environmental allergies and food allergies. Neurological: Negative for syncope and headaches. Hematological: Negative for adenopathy. Does not bruise/bleed easily. Psychiatric/Behavioral: Negative for dysphoric mood. The patient is not nervous/anxious. All other systems reviewed and are negative. Vitals:    03/22/22 1036   BP: 130/88   Pulse: 91   Resp: 18   Temp: 98.1 °F (36.7 °C)   SpO2: 99%   Weight: 77.1 kg (170 lb)   Height: 5' 4\" (1.626 m)            Physical Exam  Vitals and nursing note reviewed. Constitutional:       General: She is in acute distress. Appearance: Normal appearance. She is well-developed and normal weight. HENT:      Head: Normocephalic and atraumatic. Right Ear: External ear normal.      Left Ear: External ear normal.      Mouth/Throat:      Pharynx: No oropharyngeal exudate. Eyes:      Extraocular Movements: Extraocular movements intact. Conjunctiva/sclera: Conjunctivae normal.      Pupils: Pupils are equal, round, and reactive to light. Neck:      Vascular: No JVD. Cardiovascular:      Rate and Rhythm: Normal rate and regular rhythm. Heart sounds: Normal heart sounds. No murmur heard. No friction rub. No gallop.     Pulmonary:      Effort: Pulmonary effort is normal.      Breath sounds: Normal breath sounds. Abdominal:      General: Bowel sounds are normal. There is no distension or abdominal bruit. Palpations: Abdomen is soft. There is no mass. Tenderness: There is abdominal tenderness in the epigastric area. Musculoskeletal:         General: No deformity. Normal range of motion. Cervical back: Normal range of motion and neck supple. Skin:     General: Skin is warm and dry. Capillary Refill: Capillary refill takes less than 2 seconds. Findings: No rash. Neurological:      General: No focal deficit present. Mental Status: She is alert and oriented to person, place, and time. Cranial Nerves: No cranial nerve deficit. Sensory: No sensory deficit. Gait: Gait normal.   Psychiatric:         Mood and Affect: Affect is flat. Speech: Speech normal.         Behavior: Behavior normal. Behavior is cooperative. Thought Content: Thought content normal.         Cognition and Memory: Cognition and memory normal.         Judgment: Judgment normal.          MDM  Number of Diagnoses or Management Options  Abdominal pain, chronic, epigastric: established and worsening  Contusion of left shoulder, initial encounter: new and requires workup  Diagnosis management comments: Patient's controlled substance prescription records reviewed @ the [facility]  Aware website. Information will be utilized for accurate and appropriate patient care.   02/28/2022 02/25/2022   1  Morphine Sulfate Ir 15 Mg Tab   60.00  30  Ge Bra  [id number]  Josselyn ([id number])  0/0  30.00 MME  [place]    02/28/2022 02/25/2022   1  Oxycontin Er 30 Mg Tablet   60.00  30  Ge Bra  [id number]  Josselyn ([id number])  0/0  90.00 MME  [place]    01/29/2022 01/26/2022   1  Morphine Sulfate Ir 15 Mg Tab   60.00  30  Sh Carrie  [id number]  Josselyn ([id number])  0/0  30.00 MME  [place]    01/29/2022 01/26/2022   1  Oxycontin Er 30 Mg Tablet   60.00  30  Sh Carrie [id number]  [facility] ([phone])  0/0  90.00 MME  [place]    12/30/2021 12/29/2021   1  Morphine Sulfate Ir 15 Mg Tab   60.00  30  Ge Bra  [id number]  Josselyn ([id number])  0/0  30.00 M[place]    12/30/2021 12/29/2021   1  Oxycontin Er 30 Mg Tablet   60.00  30  Ge Bra  [id number]  Josselyn ([id number])  0/0  90.00 MME  [place]    12/01/2021  10/28/2021   1  Morphine Sulfate Ir 15 Mg Tab   60.00  30  Ge Bra  [id number]  Josselyn ([id number])  0/0  30.00 M[place]    12/01/2021  10/27/2021   1  Oxycontin Er 30 Mg Tablet   60.00  30  Ge Bra  [id number]  Josselyn ([id number])  0/0  90.00 MME  [place]    11/01/2021  10/27/2021   1  Morphine Sulfate Ir 15 Mg Tab   60.00  30  Ge Bra  [id number]  Josselyn ([id number])  0/0  30.00 MME  [place]    11/01/2021  10/27/2021   1  Oxycontin Er 30 Mg Tablet   60.00  30  Ge Bra  [id number]  Josselyn ([id number])  0/0  90.00 MME  [place]    10/02/2021  09/21/2021   1  Morphine Sulfate Ir 15 Mg Tab   60.00  30  Ba Had  [id number]  Josselyn ([id number])  0/0  30.00 MME  [place]    10/02/2021  09/28/2021   1  Oxycontin Er 30 Mg Tablet   60.00  30  Ba Had  [id number]  Josselyn ([id number])  0/0  90.00 MME  [place]    09/02/2021 08/25/2021   1  Oxycontin Er 30 Mg Tablet   60.00  30  Ba Had  [id number]  Josselyn ([id number])  0/0  90.00 MME  [place]    09/02/2021 08/25/2021   1  Morphine Sulfate Ir 15 Mg Tab   60.00  30  Ba Had  [id number]  Josselyn ([id number])  0/0  30.00 MME       12:46 PM  X-ray today reveals no evidence of any acute injury, fracture or dislocation  We will go ahead and give the patient a dose of pain medicine here  Lab work-up reveals no elevation the patient's lipase her white count is slightly elevated but this is really about at her baseline when I look back at other lab results from the past.    Renal function is normal    Really no other acute findings on patient's work-up today  We will give the patient some IV fluids we will give her a booster dose of Solu-Cortef given her history of Roney's disease    She remains hemodynamically stable she is not actively vomiting    Patient will be strongly encouraged to follow back up with her [facility] GI doctors as well as her [facility] orthopedic doctors       Amount and/or Complexity of Data Reviewed  Clinical lab tests: ordered and reviewed  Tests in the radiology section of CPT®: ordered and reviewed  Tests in the medicine section of CPT®: ordered and reviewed  Decide to obtain previous medical records or to obtain history from someone other than the patient: yes  Review and summarize past medical records: yes  Independent visualization of images, tracings, or specimens: yes    Risk of Complications, Morbidity, and/or Mortality  Presenting problems: moderate  Diagnostic procedures: moderate  Management options: moderate  General comments: Elements of this note have been dictated via voice recognition software. Text and phrases may be limited by the accuracy of the software. The chart has been reviewed, but errors may still be present.       Patient Progress  Patient progress: improved         Procedures

## 2022-03-22 NOTE — ED NOTES
I have reviewed discharge instructions with the patient. The patient verbalized understanding. Patient left ED via Discharge Method: wheelchair to Home with mother. Opportunity for questions and clarification provided. Patient given 0 scripts. To continue your aftercare when you leave the hospital, you may receive an automated call from our care team to check in on how you are doing. This is a free service and part of our promise to provide the best care and service to meet your aftercare needs.  If you have questions, or wish to unsubscribe from this service please call [phone]. Thank you for Choosing our [facility] Emergency Department.

## 2022-03-31 ENCOUNTER — HOSPITAL ENCOUNTER (EMERGENCY)
Age: 42
Discharge: HOME | End: 2022-03-31
Attending: EMERGENCY MEDICINE
Payer: COMMERCIAL

## 2022-03-31 ENCOUNTER — APPOINTMENT (OUTPATIENT)
Dept: GENERAL RADIOLOGY | Age: 42
End: 2022-03-31
Attending: EMERGENCY MEDICINE
Payer: COMMERCIAL

## 2022-03-31 VITALS
BODY MASS INDEX: 29.02 KG/M2 | OXYGEN SATURATION: 95 % | WEIGHT: 170 LBS | TEMPERATURE: 99.1 F | SYSTOLIC BLOOD PRESSURE: 111 MMHG | HEART RATE: 99 BPM | HEIGHT: 64 IN | DIASTOLIC BLOOD PRESSURE: 72 MMHG | RESPIRATION RATE: 20 BRPM

## 2022-03-31 DIAGNOSIS — L03.115 CELLULITIS OF RIGHT LOWER EXTREMITY: ICD-10-CM

## 2022-03-31 DIAGNOSIS — N17.9 AKI (ACUTE KIDNEY INJURY) (HCC): ICD-10-CM
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
## 2022-03-31 DIAGNOSIS — R11.2 NON-INTRACTABLE VOMITING WITH NAUSEA, UNSPECIFIED VOMITING TYPE: Primary | ICD-10-CM

## 2022-03-31 LAB
ALBUMIN SERPL-MCNC: 3.1 G/DL (ref 3.5–5)
ALBUMIN/GLOB SERPL: 0.8 {RATIO} (ref 1.2–3.5)
ALP SERPL-CCNC: 71 U/L (ref 50–130)
ALT SERPL-CCNC: 22 U/L (ref 12–65)
ANION GAP SERPL CALC-SCNC: 12 MMOL/L (ref 7–16)
AST SERPL-CCNC: 19 U/L (ref 15–37)
BASOPHILS # BLD: 0.1 K/UL (ref 0–0.2)
BASOPHILS NFR BLD: 0 % (ref 0–2)
BILIRUB SERPL-MCNC: 0.4 MG/DL (ref 0.2–1.1)
BUN SERPL-MCNC: 19 MG/DL (ref 6–23)
CALCIUM SERPL-MCNC: 8.6 MG/DL (ref 8.3–10.4)
CHLORIDE SERPL-SCNC: 95 MMOL/L (ref 98–107)
CO2 SERPL-SCNC: 29 MMOL/L (ref 21–32)
CREAT SERPL-MCNC: 1.67 MG/DL (ref 0.6–1)
DIFFERENTIAL METHOD BLD: ABNORMAL
EOSINOPHIL # BLD: 0 K/UL (ref 0–0.8)
EOSINOPHIL NFR BLD: 0 % (ref 0.5–7.8)
ERYTHROCYTE [DISTWIDTH] IN BLOOD BY AUTOMATED COUNT: 14.6 % (ref 11.9–14.6)
GLOBULIN SER CALC-MCNC: 3.9 G/DL (ref 2.3–3.5)
GLUCOSE SERPL-MCNC: 151 MG/DL (ref 65–100)
HCT VFR BLD AUTO: 41.8 % (ref 35.8–46.3)
HGB BLD-MCNC: 13.1 G/DL (ref 11.7–15.4)
IMM GRANULOCYTES # BLD AUTO: 0.1 K/UL (ref 0–0.5)
IMM GRANULOCYTES NFR BLD AUTO: 1 % (ref 0–5)
LIPASE SERPL-CCNC: 347 U/L (ref 73–393)
LYMPHOCYTES # BLD: 2 K/UL (ref 0.5–4.6)
LYMPHOCYTES NFR BLD: 11 % (ref 13–44)
MAGNESIUM SERPL-MCNC: 1.9 MG/DL (ref 1.8–2.4)
MCH RBC QN AUTO: 25.8 PG (ref 26.1–32.9)
MCHC RBC AUTO-ENTMCNC: 31.3 G/DL (ref 31.4–35)
MCV RBC AUTO: 82.4 FL (ref 79.6–97.8)
MONOCYTES # BLD: 0.6 K/UL (ref 0.1–1.3)
MONOCYTES NFR BLD: 3 % (ref 4–12)
NEUTS SEG # BLD: 15.6 K/UL (ref 1.7–8.2)
NEUTS SEG NFR BLD: 85 % (ref 43–78)
NRBC # BLD: 0 K/UL (ref 0–0.2)
PLATELET # BLD AUTO: 389 K/UL (ref 150–450)
PMV BLD AUTO: 9.6 FL (ref 9.4–12.3)
POTASSIUM SERPL-SCNC: 3.6 MMOL/L (ref 3.5–5.1)
PROT SERPL-MCNC: 7 G/DL (ref 6.3–8.2)
RBC # BLD AUTO: 5.07 M/UL (ref 4.05–5.2)
SODIUM SERPL-SCNC: 136 MMOL/L (ref 136–145)
WBC # BLD AUTO: 18.4 K/UL (ref 4.3–11.1)

## 2022-03-31 PROCEDURE — 96375 TX/PRO/DX INJ NEW DRUG ADDON: CPT

## 2022-03-31 PROCEDURE — 80053 COMPREHEN METABOLIC PANEL: CPT

## 2022-03-31 PROCEDURE — 74011250636 HC RX REV CODE- 250/636: Performed by: EMERGENCY MEDICINE

## 2022-03-31 PROCEDURE — 96374 THER/PROPH/DIAG INJ IV PUSH: CPT

## 2022-03-31 PROCEDURE — 73110 X-RAY EXAM OF WRIST: CPT

## 2022-03-31 PROCEDURE — 83690 ASSAY OF LIPASE: CPT

## 2022-03-31 PROCEDURE — 83735 ASSAY OF MAGNESIUM: CPT

## 2022-03-31 PROCEDURE — 73000 X-RAY EXAM OF COLLAR BONE: CPT

## 2022-03-31 PROCEDURE — 73030 X-RAY EXAM OF SHOULDER: CPT

## 2022-03-31 PROCEDURE — 99284 EMERGENCY DEPT VISIT MOD MDM: CPT

## 2022-03-31 PROCEDURE — 96376 TX/PRO/DX INJ SAME DRUG ADON: CPT

## 2022-03-31 PROCEDURE — 85025 COMPLETE CBC W/AUTO DIFF WBC: CPT

## 2022-03-31 PROCEDURE — 96361 HYDRATE IV INFUSION ADD-ON: CPT

## 2022-03-31 RX ORDER — MORPHINE SULFATE 4 MG/ML
4 INJECTION INTRAVENOUS
Status: COMPLETED | OUTPATIENT
Start: 2022-03-31 | End: 2022-03-31

## 2022-03-31 RX ORDER — DOXYCYCLINE HYCLATE 100 MG
100 TABLET ORAL 2 TIMES DAILY
Qty: 14 TABLET | Refills: 0 | Status: SHIPPED | OUTPATIENT
Start: 2022-03-31 | End: 2022-04-07

## 2022-03-31 RX ORDER — ONDANSETRON 2 MG/ML
4 INJECTION INTRAMUSCULAR; INTRAVENOUS
Status: COMPLETED | OUTPATIENT
Start: 2022-03-31 | End: 2022-03-31

## 2022-03-31 RX ADMIN — METHYLPREDNISOLONE SODIUM SUCCINATE 60 MG: 125 INJECTION, POWDER, FOR SOLUTION INTRAMUSCULAR; INTRAVENOUS at 06:47

## 2022-03-31 RX ADMIN — SODIUM CHLORIDE 1000 ML: 900 INJECTION, SOLUTION INTRAVENOUS at 06:53

## 2022-03-31 RX ADMIN — MORPHINE SULFATE 4 MG: 4 INJECTION INTRAVENOUS at 06:21

## 2022-03-31 RX ADMIN — MORPHINE SULFATE 4 MG: 4 INJECTION INTRAVENOUS at 07:26

## 2022-03-31 RX ADMIN — ONDANSETRON 4 MG: 2 INJECTION INTRAMUSCULAR; INTRAVENOUS at 06:22

## 2022-03-31 NOTE — DISCHARGE INSTRUCTIONS
Follow-up with your family doctor in the next 1 to 2 days for repeat evaluation. Make sure you are taking your Zofran and other medications at home as directed. If you start developing worsening redness around your laceration start the antibiotic at that time otherwise consult with your infectious disease physician before starting. You will need to have your kidney function rechecked in 1 week.

## 2022-03-31 NOTE — ED PROVIDER NOTES
Patient is a 50-year-old female presenting to the emergency department today complaining of nausea and vomiting since being discharged from the hospital yesterday. The patient went to the hospital after a fall. She says that she tripped on the threshold landing on the ground. The patient had multiple x-rays done yesterday but she says since she was discharged she has had episodes of vomiting and thinks that her potassium may be low and is worried that her pancreatitis which is intermittent may be flaring up now. She takes morphine and OxyContin at home but says she is not able to keep these down. She is also concerned about her left shoulder which she has a scheduled total reverse procedure planned for May 16. She is concerned about her left wrist and wanted me to evaluate her right knee as well. Right knee was x-rayed yesterday and the laceration was repaired. Patient takes Keflex 500 mg twice daily and has done so for years because of a lowered immune system with chronic narcotic use. She follows with infectious disease.   She has multiple antibiotic drug allergies and says that she cannot take anything except for the Keflex, Levaquin, doxycycline and vancomycin           Past Medical History:   Diagnosis Date    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    Arthritis     Aseptic necrosis of head of humerus ([facility] [address] 12/8/2011    Asthma     uses MDI as needed - rarely-pt can't remember last time needed    Avascular necrosis of femur head     right hip, left hip replacements    Chronic pain     Endocrine disease diagnosed 2003    ADDISONS DISEASE    Endometriosis     hysterectomy    GERD (gastroesophageal reflux disease)     Infectious disease 2007    MRSA-2007, community acquired - in hospital [facility]) about 6 months - 3 months of which was in coma;  HPV-2009     Nausea & vomiting     Other ill-defined conditions(799.89)     allergies    Pancreatitis chronic     7 episodes    Unspecified adverse effect of anesthesia     pt reports \"difficult to sedate       Past Surgical History:   Procedure Laterality Date    HX APPENDECTOMY      HX CHOLECYSTECTOMY      HX GYN      hysterectomy    HX ORTHOPAEDIC  2010    right EDIN    HX ORTHOPAEDIC  2010    Left  EDIN    HX OTHER SURGICAL  2007    I&D of 9 abcesses -MRSA    HX SHOULDER REPLACEMENT  12/2011    Right    HX SHOULDER REPLACEMENT  2015    left    HX TONSILLECTOMY           Family History:   Problem Relation Age of Onset    Cancer Mother         THROID CANCER    Diabetes Maternal Grandmother     Stroke Maternal Grandfather     Heart Disease Paternal Grandmother        Social History     Socioeconomic History    Marital status: SINGLE     Spouse name: Not on file    Number of children: Not on file    Years of education: Not on file    Highest education level: Not on file   Occupational History    Not on file   Tobacco Use    Smoking status: Never Smoker    Smokeless tobacco: Never Used   Substance and Sexual Activity    Alcohol use: No    Drug use: No    Sexual activity: Not Currently   Other Topics Concern    Not on file   Social History Narrative    Not on file     Social Determinants of Health     Financial Resource Strain:     Difficulty of Paying Living Expenses: Not on file   Food Insecurity:     Worried About Running Out of Food in the Last Year: Not on file    Hussein of Food in the Last Year: Not on file   Transportation Needs:     Lack of Transportation (Medical): Not on file    Lack of Transportation (Non-Medical):  Not on file   Physical Activity:     Days of Exercise per Week: Not on file    Minutes of Exercise per Session: Not on file   Stress:     Feeling of Stress : Not on file   Social Connections:     Frequency of Communication with Friends and Family: Not on file    Frequency of Social Gatherings with Friends and Family: Not on file    Attends [employer] Services: Not on file   Yoly Larger Active Member of Clubs or Organizations: Not on file    Attends Club or Organization Meetings: Not on file    Marital Status: Not on file   Intimate Partner Violence:     Fear of Current or Ex-Partner: Not on file    Emotionally Abused: Not on file    Physically Abused: Not on file    Sexually Abused: Not on file   Housing Stability:     Unable to Pay for Housing in the Last Year: Not on file    Number of [place] in the Last Year: Not on file    Unstable Housing in the Last Year: Not on file         ALLERGIES: Latex, [place] oil, Brazil nut, Peanut, Shellfish containing products, Beef containing products, Chicken derived, Chlorpromazine, Clindamycin, Dopamine receptor agonist, Gluten, Hydroxyzine pamoate, Ibuprofen, Iodine, Naproxen sodium, Nubain [nalbuphine], Other medication, Pcn [penicillins], Povidone-iodine, Prochlorperazine edisylate, Promethazine, Reglan [metoclopramide], Soy, Sulfa (sulfonamide antibiotics), Toradol [ketorolac tromethamine], Tramadol, and Zithromax [azithromycin]    Review of Systems   Musculoskeletal: Positive for joint swelling. Skin: Positive for color change and wound. All other systems reviewed and are negative. Vitals:    03/31/22 0522 03/31/22 0700   BP:  117/79   Pulse: (!) 119 99   Resp: 20    Temp: 99.1 °F (37.3 °C)    SpO2: 94% 93%   Weight: 77.1 kg (170 lb)    Height: 5' 4\" (1.626 m)             Physical Exam     GENERAL:The patient has Body mass index is 29.18 kg/m². Well-hydrated. VITAL SIGNS: Heart rate, blood pressure, respiratory rate reviewed as recorded in  nurse's notes  EYES: Pupils reactive. Extraocular motion intact. No conjunctival redness or drainage. NECK: Supple, no meningeal signs. Trachea midline. No masses or thyromegaly. LUNGS: Breath sounds clear and equal bilaterally no accessory muscle use. CHEST: No deformity  CARDIOVASCULAR: Regular rate and rhythm  ABDOMEN: Soft with epigastric tenderness. No palpable masses or organomegaly. No  peritoneal signs. No rigidity. EXTREMITIES: Slight erythema and swelling to the right knee with a recently closed laceration. Left wrist shows no gross bony deformities but she has tenderness palpation in the carpal bones. Radial pulses easily palpable and she has good capillary refill. Tender to palpation over the left clavicle and left shoulder but no obvious deformities appreciated. Patient's left arm is in a sling which is chronic. NEUROLOGIC: Sensation is grossly intact. Cranial nerve exam reveals face is  symmetrical, tongue is midline speech is clear. SKIN: No petechiae. Good skin turgor palpated. Erythema over the right knee at the site of her recently closed laceration. There is warmth to this area. PSYCHIATRIC: Alert and oriented. Appropriate behavior and judgment. MDM  Number of Diagnoses or Management Options  Diagnosis management comments: Sprain, strain, tendon injury, contusion,    Abrasion, laceration, neurovascular injury, foreign body    Fracture, open fracture, dislocation, joint separation, articular surface injury,    Electrolyte abnormality, pancreatitis, exacerbation of chronic pain, vomiting,       Amount and/or Complexity of Data Reviewed  Clinical lab tests: reviewed and ordered  Tests in the radiology section of CPT®: ordered and reviewed  Tests in the medicine section of CPT®: ordered and reviewed  Review and summarize past medical records: yes  Independent visualization of images, tracings, or specimens: yes      ED Course as of 03/31/22 0706   Thu Mar 31, 2022   0634 Patient is requesting stress dose steroids. Patient states that because she was not able to keep her normal steroid dose is down anytime she has any kind of trauma her primary doctor increases her steroids temporarily.   She will be given Solu-Medrol 60 mg IV [KH]   0649 GFR est non-AA(!): 36 [KH]   0650 Creatinine(!): 1.67 [KH]   0706 I talked to the patient on the findings in the emergency department. She will be given a liter of IV fluids and another dose of pain medicine. The patient and I discussed outpatient follow-up with her family doctor and she is agreeable with this plan.  [KH]      ED Course User Index  [KH] Maria De Jesus Cole, DO       Procedures

## 2022-03-31 NOTE — ED NOTES
I have reviewed discharge instructions with the patient and caregiver. The patient and caregiver verbalized understanding. Patient left ED via Discharge Method: wheelchair to Home. Opportunity for questions and clarification provided. Patient given 1 scripts. To continue your aftercare when you leave the hospital, you may receive an automated call from our care team to check in on how you are doing. This is a free service and part of our promise to provide the best care and service to meet your aftercare needs.  If you have questions, or wish to unsubscribe from this service please call [phone]. Thank you for Choosing our [address] [place] [address] Emergency Department.

## 2022-03-31 NOTE — ED TRIAGE NOTES
Pt states she fell yesterday. Prior to fall she was having abdominal pain. Pt states she is having continuing pain in her abdomen.  Pt now has pain in her R foot, R knee, bilat hips, L rib cage, L shoulder

## 2022-04-24 ENCOUNTER — HOSPITAL ENCOUNTER (EMERGENCY)
Age: 42
Discharge: HOME | End: 2022-04-24
Attending: EMERGENCY MEDICINE
Payer: COMMERCIAL

## 2022-04-24 VITALS
SYSTOLIC BLOOD PRESSURE: 127 MMHG | DIASTOLIC BLOOD PRESSURE: 96 MMHG | TEMPERATURE: 98.6 F | OXYGEN SATURATION: 99 % | BODY MASS INDEX: 29.02 KG/M2 | WEIGHT: 170 LBS | HEART RATE: 92 BPM | RESPIRATION RATE: 16 BRPM | HEIGHT: 64 IN

## 2022-04-24 DIAGNOSIS — R10.9 ACUTE ABDOMINAL PAIN: Primary | ICD-10-CM

## 2022-04-24 DIAGNOSIS — R19.7 DIARRHEA, UNSPECIFIED TYPE: ICD-10-CM

## 2022-04-24 DIAGNOSIS — R11.2 NAUSEA AND VOMITING, UNSPECIFIED VOMITING TYPE: ICD-10-CM

## 2022-04-24 DIAGNOSIS — G89.29 OTHER CHRONIC PAIN: ICD-10-CM

## 2022-04-24 LAB
ALBUMIN SERPL-MCNC: 3.3 G/DL (ref 3.5–5)
ALBUMIN/GLOB SERPL: 0.8 {RATIO} (ref 1.2–3.5)
ALP SERPL-CCNC: 55 U/L (ref 50–136)
ALT SERPL-CCNC: 19 U/L (ref 12–65)
ANION GAP SERPL CALC-SCNC: 8 MMOL/L (ref 7–16)
AST SERPL-CCNC: 19 U/L (ref 15–37)
BASOPHILS # BLD: 0 K/UL (ref 0–0.2)
BASOPHILS NFR BLD: 0 % (ref 0–2)
BILIRUB SERPL-MCNC: 0.4 MG/DL (ref 0.2–1.1)
BUN SERPL-MCNC: 12 MG/DL (ref 6–23)
CALCIUM SERPL-MCNC: 9.6 MG/DL (ref 8.3–10.4)
CHLORIDE SERPL-SCNC: 97 MMOL/L (ref 98–107)
CO2 SERPL-SCNC: 33 MMOL/L (ref 21–32)
CREAT SERPL-MCNC: 1.13 MG/DL (ref 0.6–1)
DIFFERENTIAL METHOD BLD: ABNORMAL
EOSINOPHIL # BLD: 0 K/UL (ref 0–0.8)
EOSINOPHIL NFR BLD: 0 % (ref 0.5–7.8)
ERYTHROCYTE [DISTWIDTH] IN BLOOD BY AUTOMATED COUNT: 15.1 % (ref 11.9–14.6)
GLOBULIN SER CALC-MCNC: 4.1 G/DL (ref 2.3–3.5)
GLUCOSE SERPL-MCNC: 113 MG/DL (ref 65–100)
HCT VFR BLD AUTO: 40.9 % (ref 35.8–46.3)
HGB BLD-MCNC: 12.7 G/DL (ref 11.7–15.4)
IMM GRANULOCYTES # BLD AUTO: 0.1 K/UL (ref 0–0.5)
IMM GRANULOCYTES NFR BLD AUTO: 0 % (ref 0–5)
LIPASE SERPL-CCNC: 72 U/L (ref 73–393)
LYMPHOCYTES # BLD: 1.8 K/UL (ref 0.5–4.6)
LYMPHOCYTES NFR BLD: 14 % (ref 13–44)
MCH RBC QN AUTO: 26.1 PG (ref 26.1–32.9)
MCHC RBC AUTO-ENTMCNC: 31.1 G/DL (ref 31.4–35)
MCV RBC AUTO: 84 FL (ref 79.6–97.8)
MONOCYTES # BLD: 0.3 K/UL (ref 0.1–1.3)
MONOCYTES NFR BLD: 2 % (ref 4–12)
NEUTS SEG # BLD: 10.8 K/UL (ref 1.7–8.2)
NEUTS SEG NFR BLD: 84 % (ref 43–78)
NRBC # BLD: 0 K/UL (ref 0–0.2)
PLATELET # BLD AUTO: 412 K/UL (ref 150–450)
PMV BLD AUTO: 9.7 FL (ref 9.4–12.3)
POTASSIUM SERPL-SCNC: 3.3 MMOL/L (ref 3.5–5.1)
PROT SERPL-MCNC: 7.4 G/DL (ref 6.3–8.2)
RBC # BLD AUTO: 4.87 M/UL (ref 4.05–5.2)
SODIUM SERPL-SCNC: 138 MMOL/L (ref 136–145)
WBC # BLD AUTO: 13 K/UL (ref 4.3–11.1)

## 2022-04-24 PROCEDURE — 96376 TX/PRO/DX INJ SAME DRUG ADON: CPT

## 2022-04-24 PROCEDURE — 74011250636 HC RX REV CODE- 250/636: Performed by: EMERGENCY MEDICINE

## 2022-04-24 PROCEDURE — 99284 EMERGENCY DEPT VISIT MOD MDM: CPT

## 2022-04-24 PROCEDURE — 96374 THER/PROPH/DIAG INJ IV PUSH: CPT

## 2022-04-24 PROCEDURE — 85025 COMPLETE CBC W/AUTO DIFF WBC: CPT

## 2022-04-24 PROCEDURE — 74011000258 HC RX REV CODE- 258: Performed by: EMERGENCY MEDICINE

## 2022-04-24 PROCEDURE — 81003 URINALYSIS AUTO W/O SCOPE: CPT

## 2022-04-24 PROCEDURE — 83690 ASSAY OF LIPASE: CPT

## 2022-04-24 PROCEDURE — 80053 COMPREHEN METABOLIC PANEL: CPT

## 2022-04-24 PROCEDURE — 96375 TX/PRO/DX INJ NEW DRUG ADDON: CPT

## 2022-04-24 RX ORDER — ONDANSETRON 2 MG/ML
4 INJECTION INTRAMUSCULAR; INTRAVENOUS
Status: COMPLETED | OUTPATIENT
Start: 2022-04-24 | End: 2022-04-24

## 2022-04-24 RX ORDER — HYDROMORPHONE HYDROCHLORIDE 1 MG/ML
0.5 INJECTION, SOLUTION INTRAMUSCULAR; INTRAVENOUS; SUBCUTANEOUS ONCE
Status: COMPLETED | OUTPATIENT
Start: 2022-04-24 | End: 2022-04-24

## 2022-04-24 RX ORDER — HYDROMORPHONE HYDROCHLORIDE 1 MG/ML
0.5 INJECTION, SOLUTION INTRAMUSCULAR; INTRAVENOUS; SUBCUTANEOUS
Status: COMPLETED | OUTPATIENT
Start: 2022-04-24 | End: 2022-04-24

## 2022-04-24 RX ORDER — SODIUM CHLORIDE 0.9 % (FLUSH) 0.9 %
5-10 SYRINGE (ML) INJECTION AS NEEDED
Status: DISCONTINUED | OUTPATIENT
Start: 2022-04-24 | End: 2022-04-24 | Stop reason: HOSPADM

## 2022-04-24 RX ORDER — HYDROCORTISONE SODIUM SUCCINATE 100 MG/2ML
100 INJECTION, POWDER, FOR SOLUTION INTRAMUSCULAR; INTRAVENOUS
Status: COMPLETED | OUTPATIENT
Start: 2022-04-24 | End: 2022-04-24

## 2022-04-24 RX ORDER — SODIUM CHLORIDE 0.9 % (FLUSH) 0.9 %
5-10 SYRINGE (ML) INJECTION EVERY 8 HOURS
Status: DISCONTINUED | OUTPATIENT
Start: 2022-04-24 | End: 2022-04-24 | Stop reason: HOSPADM

## 2022-04-24 RX ADMIN — HYDROCORTISONE SODIUM SUCCINATE 100 MG: 100 INJECTION, POWDER, FOR SOLUTION INTRAMUSCULAR; INTRAVENOUS at 17:00

## 2022-04-24 RX ADMIN — HYDROMORPHONE HYDROCHLORIDE 0.5 MG: 1 INJECTION, SOLUTION INTRAMUSCULAR; INTRAVENOUS; SUBCUTANEOUS at 16:29

## 2022-04-24 RX ADMIN — ONDANSETRON 4 MG: 2 INJECTION INTRAMUSCULAR; INTRAVENOUS at 15:02

## 2022-04-24 RX ADMIN — ONDANSETRON 4 MG: 2 INJECTION INTRAMUSCULAR; INTRAVENOUS at 16:29

## 2022-04-24 RX ADMIN — FAMOTIDINE 20 MG: 10 INJECTION, SOLUTION INTRAVENOUS at 15:02

## 2022-04-24 RX ADMIN — SODIUM CHLORIDE 1000 ML: 900 INJECTION, SOLUTION INTRAVENOUS at 15:03

## 2022-04-24 RX ADMIN — HYDROMORPHONE HYDROCHLORIDE 0.5 MG: 1 INJECTION, SOLUTION INTRAMUSCULAR; INTRAVENOUS; SUBCUTANEOUS at 15:03

## 2022-04-24 NOTE — ED NOTES
Patient is here with \"a chronic pancreatitis flare or kidney stone with nausea, vomiting and abdominal pain for two days. \" Some dysuria and diarrhea. Patients mom drove her here. H/O cholecystectomy/appendectomy and complete hysterectomy. Seen at [facility] this am, labs all essentially normal. +Tenderness to palpation over epigastric/RUQ area. Patient evaluated initially in triage. Rapid Medical Evaluation was conducted and necessary orders have been placed. I have performed a medical screening exam.  Care will now be transferred to the provider in the back of the emergency department.   ANUEL Pisano 12:07 PM

## 2022-04-24 NOTE — ED PROVIDER NOTES
65-year-old female with history of chronic pancreatitis, GERD, chronic pain, avascular necrosis, and adrenal insufficiency presents to the emergency department complaining of worsening pain over the last 2 days with associated nausea vomiting and diarrhea. Patient has been unable to keep her normal pain medications down as well as her medications for her adrenal insufficiency. Complains of feeling a little lightheaded with standing as well as diffuse abdominal pain, worse in the right upper quadrant and epigastric region. She denies any melena hematochezia. She went to [facility] earlier today, stated they did not do anything for her and left AMA. When asked what she they were expected to do, she said they did nothing for my pain. The history is provided by the patient and a parent. Abdominal Pain   This is a chronic problem. The current episode started more than 2 days ago. The problem occurs constantly. The problem has been gradually worsening. The pain is associated with an unknown factor. The pain is located in the generalized abdominal region, epigastric region and RUQ. The quality of the pain is aching and sharp. The pain is at a severity of 8/10. Associated symptoms include anorexia, diarrhea, nausea, vomiting, dysuria, arthralgias and back pain. Pertinent negatives include no fever, no belching, no flatus, no hematochezia, no melena, no constipation, no frequency, no hematuria, no headaches, no myalgias, no trauma and no chest pain. The pain is worsened by activity and eating. The pain is relieved by nothing. Her past medical history is significant for GERD, irritable bowel syndrome and pancreatitis. Her past medical history does not include PUD, gallstones, ulcerative colitis, Crohn's disease, cancer, UTI, diverticulitis, atrial fibrillation, DM, kidney stones or small bowel obstruction. The patient's surgical history includes appendectomy and cholecystectomy.        Past Medical History: Diagnosis Date    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    Arthritis     Aseptic necrosis of head of humerus ([facility] [address] 12/8/2011    Asthma     uses MDI as needed - rarely-pt can't remember last time needed    Avascular necrosis of femur head     right hip, left hip replacements    Chronic pain     Endocrine disease diagnosed 2003    ADDISONS DISEASE    Endometriosis     hysterectomy    GERD (gastroesophageal reflux disease)     Infectious disease 2007    MRSA-2007, community acquired - in hospital [facility]) about 6 months - 3 months of which was in coma;  HPV-2009     Nausea & vomiting     Other ill-defined conditions(799.89)     allergies    Pancreatitis chronic     7 episodes    Unspecified adverse effect of anesthesia     pt reports \"difficult to sedate       Past Surgical History:   Procedure Laterality Date    HX APPENDECTOMY      HX CHOLECYSTECTOMY      HX GYN      hysterectomy    HX ORTHOPAEDIC  2010    right EDIN    HX ORTHOPAEDIC  2010    Left  EDIN    HX OTHER SURGICAL  2007    I&D of 9 abcesses -MRSA    HX SHOULDER REPLACEMENT  12/2011    Right    HX SHOULDER REPLACEMENT  2015    left    HX TONSILLECTOMY           Family History:   Problem Relation Age of Onset    Cancer Mother         THROID CANCER    Diabetes Maternal Grandmother     Stroke Maternal Grandfather     Heart Disease Paternal Grandmother        Social History     Socioeconomic History    Marital status: SINGLE     Spouse name: Not on file    Number of children: Not on file    Years of education: Not on file    Highest education level: Not on file   Occupational History    Not on file   Tobacco Use    Smoking status: Never Smoker    Smokeless tobacco: Never Used   Substance and Sexual Activity    Alcohol use: No    Drug use: No    Sexual activity: Not Currently   Other Topics Concern    Not on file   Social History Narrative    Not on file     Social Determinants of Health     Financial Resource Strain:     Difficulty of Paying Living Expenses: Not on file   Food Insecurity:     Worried About Running Out of Food in the Last Year: Not on file    Hussein of Food in the Last Year: Not on file   Transportation Needs:     Lack of Transportation (Medical): Not on file    Lack of Transportation (Non-Medical): Not on file   Physical Activity:     Days of Exercise per Week: Not on file    Minutes of Exercise per Session: Not on file   Stress:     Feeling of Stress : Not on file   Social Connections:     Frequency of Communication with Friends and Family: Not on file    Frequency of Social Gatherings with Friends and Family: Not on file    Attends Yaz[employer] Services: Not on file    Active Member of [address] [place] [address] or Organizations: Not on file    Attends Club or Organization Meetings: Not on file    Marital Status: Not on file   Intimate Partner Violence:     Fear of Current or Ex-Partner: Not on file    Emotionally Abused: Not on file    Physically Abused: Not on file    Sexually Abused: Not on file   Housing Stability:     Unable to Pay for Housing in the Last Year: Not on file    Number of [place] in the Last Year: Not on file    Unstable Housing in the Last Year: Not on file         ALLERGIES: Latex, [place] oil, Brazil nut, Peanut, Shellfish containing products, Beef containing products, Chicken derived, Chlorpromazine, Clindamycin, Dopamine receptor agonist, Gluten, Hydroxyzine pamoate, Ibuprofen, Iodine, Naproxen sodium, Nubain [nalbuphine], Other medication, Pcn [penicillins], Povidone-iodine, Prochlorperazine edisylate, Promethazine, Reglan [metoclopramide], Soy, Sulfa (sulfonamide antibiotics), Toradol [ketorolac tromethamine], Tramadol, and Zithromax [azithromycin]    Review of Systems   Constitutional: Negative for chills and fever. HENT: Negative for congestion. Respiratory: Negative for cough and shortness of breath.     Cardiovascular: Negative for chest pain, palpitations and leg swelling. Gastrointestinal: Positive for abdominal pain, anorexia, diarrhea, nausea and vomiting. Negative for blood in stool, constipation, flatus, hematochezia and melena. Genitourinary: Positive for dysuria. Negative for frequency and hematuria. Musculoskeletal: Positive for arthralgias and back pain. Negative for myalgias. Neurological: Negative for headaches. All other systems reviewed and are negative. Vitals:    04/24/22 1208   BP: (!) 144/97   Pulse: (!) 101   Resp: 16   Temp: 98.6 °F (37 °C)   SpO2: 97%   Weight: 77.1 kg (170 lb)   Height: 5' 4\" (1.626 m)            Physical Exam  Vitals and nursing note reviewed. Constitutional:       General: She is not in acute distress. Appearance: She is well-developed. HENT:      Head: Normocephalic and atraumatic. Right Ear: External ear normal.      Left Ear: External ear normal.   Eyes:      Extraocular Movements: Extraocular movements intact. Conjunctiva/sclera: Conjunctivae normal.      Pupils: Pupils are equal, round, and reactive to light. Cardiovascular:      Rate and Rhythm: Normal rate and regular rhythm. Heart sounds: Normal heart sounds. Pulmonary:      Effort: Pulmonary effort is normal.      Breath sounds: Normal breath sounds. Abdominal:      General: Bowel sounds are normal. There is no distension. Palpations: Abdomen is soft. There is no shifting dullness, hepatomegaly, splenomegaly, mass or pulsatile mass. Tenderness: There is generalized abdominal tenderness and tenderness in the right upper quadrant and epigastric area. There is no right CVA tenderness, left CVA tenderness, guarding or rebound. Negative signs include Cuellar's sign and McBurney's sign. Hernia: No hernia is present. Musculoskeletal:         General: Normal range of motion. Cervical back: Normal range of motion and neck supple. Right lower leg: No edema. Left lower leg: No edema.       Comments: Patient diffusely tender to palpation, out of proportion to exam.   Skin:     General: Skin is warm and dry. Capillary Refill: Capillary refill takes less than 2 seconds. Neurological:      General: No focal deficit present. Mental Status: She is alert and oriented to person, place, and time. Psychiatric:         Speech: Speech normal.         Cognition and Memory: Cognition and memory normal.          MDM  Number of Diagnoses or Management Options  Acute abdominal pain: new and requires workup  Diarrhea, unspecified type: new and requires workup  Nausea and vomiting, unspecified vomiting type: new and requires workup  Other chronic pain: minor     Amount and/or Complexity of Data Reviewed  Clinical lab tests: ordered and reviewed  Review and summarize past medical records: yes (Patient with multiple prior visits with similar complaints. Multiple prior CTs of the abdomen pelvis.)    Risk of Complications, Morbidity, and/or Mortality  Presenting problems: moderate  Diagnostic procedures: minimal  Management options: moderate    Patient Progress  Patient progress: improved         Procedures    The patient was observed in the ED. patient feeling much improved after IV Dilaudid and Zofran. Repeat exam with minimal tenderness to palpation in the epigastric region. I suspect a lot of her symptoms are related to gastritis and subsequent narcotic withdrawal from the nausea and vomiting over the last 2 days and unable to keep her chronic pain meds down. Patient instructed to return in 24 to 48 hours if not significantly improved.     Results Reviewed:      Recent Results (from the past 24 hour(s))   CBC WITH AUTOMATED DIFF    Collection Time: 04/24/22  2:49 PM   Result Value Ref Range    WBC 13.0 (H) 4.3 - 11.1 K/uL    RBC 4.87 4.05 - 5.2 M/uL    HGB 12.7 11.7 - 15.4 g/dL    HCT 40.9 35.8 - 46.3 %    MCV 84.0 79.6 - 97.8 FL    MCH 26.1 26.1 - 32.9 PG    MCHC 31.1 (L) 31.4 - 35.0 g/dL    RDW 15.1 (H) 11.9 - 14.6 % PLATELET 866 340 - 534 K/uL    MPV 9.7 9.4 - 12.3 FL    ABSOLUTE NRBC 0.00 0.0 - 0.2 K/uL    DF AUTOMATED      NEUTROPHILS 84 (H) 43 - 78 %    LYMPHOCYTES 14 13 - 44 %    MONOCYTES 2 (L) 4.0 - 12.0 %    EOSINOPHILS 0 (L) 0.5 - 7.8 %    BASOPHILS 0 0.0 - 2.0 %    IMMATURE GRANULOCYTES 0 0.0 - 5.0 %    ABS. NEUTROPHILS 10.8 (H) 1.7 - 8.2 K/UL    ABS. LYMPHOCYTES 1.8 0.5 - 4.6 K/UL    ABS. MONOCYTES 0.3 0.1 - 1.3 K/UL    ABS. EOSINOPHILS 0.0 0.0 - 0.8 K/UL    ABS. BASOPHILS 0.0 0.0 - 0.2 K/UL    ABS. IMM. GRANS. 0.1 0.0 - 0.5 K/UL   METABOLIC PANEL, COMPREHENSIVE    Collection Time: 04/24/22  2:49 PM   Result Value Ref Range    Sodium 138 136 - 145 mmol/L    Potassium 3.3 (L) 3.5 - 5.1 mmol/L    Chloride 97 (L) 98 - 107 mmol/L    CO2 33 (H) 21 - 32 mmol/L    Anion gap 8 7 - 16 mmol/L    Glucose 113 (H) 65 - 100 mg/dL    BUN 12 6 - 23 MG/DL    Creatinine 1.13 (H) 0.6 - 1.0 MG/DL    GFR est AA >60 >60 ml/min/1.73m2    GFR est non-AA 56 (L) >60 ml/min/1.73m2    Calcium 9.6 8.3 - 10.4 MG/DL    Bilirubin, total 0.4 0.2 - 1.1 MG/DL    ALT (SGPT) 19 12 - 65 U/L    AST (SGOT) 19 15 - 37 U/L    Alk. phosphatase 55 50 - 136 U/L    Protein, total 7.4 6.3 - 8.2 g/dL    Albumin 3.3 (L) 3.5 - 5.0 g/dL    Globulin 4.1 (H) 2.3 - 3.5 g/dL    A-G Ratio 0.8 (L) 1.2 - 3.5     LIPASE    Collection Time: 04/24/22  2:49 PM   Result Value Ref Range    Lipase 72 (L) 73 - 393 U/L       I discussed the results of all labs, procedures, radiographs, and treatments with the patient and available family. Treatment plan is agreed upon and the patient is ready for discharge. All voiced understanding of the discharge plan and medication instructions or changes as appropriate. Questions about treatment in the ED were answered. All were encouraged to return should symptoms worsen or new problems develop.

## 2022-04-24 NOTE — ED NOTES
I have reviewed discharge instructions with the patient. The patient verbalized understanding. Patient left ED via Discharge Method: ambulatory to Home with self. Opportunity for questions and clarification provided. Patient given 0 scripts. To continue your aftercare when you leave the hospital, you may receive an automated call from our care team to check in on how you are doing. This is a free service and part of our promise to provide the best care and service to meet your aftercare needs.  If you have questions, or wish to unsubscribe from this service please call [phone]. Thank you for Choosing our [address] [place] [address] Emergency Department.

## 2022-04-24 NOTE — ED TRIAGE NOTES
Pt states upper abd pain with vomiting and diarrhea for about three days. Hx of chronic pancreatitis. States some burning with urination also. Masked for triage.

## 2022-04-28 ENCOUNTER — HOSPITAL ENCOUNTER (EMERGENCY)
Age: 42
Discharge: HOME | End: 2022-04-28
Attending: EMERGENCY MEDICINE
Payer: COMMERCIAL

## 2022-04-28 ENCOUNTER — APPOINTMENT (OUTPATIENT)
Dept: GENERAL RADIOLOGY | Age: 42
End: 2022-04-28
Attending: PHYSICIAN ASSISTANT
Payer: COMMERCIAL

## 2022-04-28 VITALS
HEIGHT: 64 IN | OXYGEN SATURATION: 96 % | WEIGHT: 170 LBS | HEART RATE: 91 BPM | TEMPERATURE: 98.4 F | BODY MASS INDEX: 29.02 KG/M2 | SYSTOLIC BLOOD PRESSURE: 125 MMHG | RESPIRATION RATE: 18 BRPM | DIASTOLIC BLOOD PRESSURE: 73 MMHG

## 2022-04-28 DIAGNOSIS — E87.6 HYPOKALEMIA: ICD-10-CM

## 2022-04-28 DIAGNOSIS — S92.901G CLOSED FRACTURE OF RIGHT FOOT WITH DELAYED HEALING, SUBSEQUENT ENCOUNTER: Primary | ICD-10-CM

## 2022-04-28 DIAGNOSIS — K86.1 CHRONIC PANCREATITIS, UNSPECIFIED PANCREATITIS TYPE (HCC): ICD-10-CM

## 2022-04-28 LAB
ALBUMIN SERPL-MCNC: 2.9 G/DL (ref 3.5–5)
ALBUMIN/GLOB SERPL: 0.9 {RATIO} (ref 1.2–3.5)
ALP SERPL-CCNC: 50 U/L (ref 50–130)
ALT SERPL-CCNC: 19 U/L (ref 12–65)
ANION GAP SERPL CALC-SCNC: 11 MMOL/L (ref 7–16)
AST SERPL-CCNC: 16 U/L (ref 15–37)
BASOPHILS # BLD: 0.1 K/UL (ref 0–0.2)
BASOPHILS NFR BLD: 0 % (ref 0–2)
BILIRUB SERPL-MCNC: 0.3 MG/DL (ref 0.2–1.1)
BUN SERPL-MCNC: 8 MG/DL (ref 6–23)
CALCIUM SERPL-MCNC: 8.5 MG/DL (ref 8.3–10.4)
CHLORIDE SERPL-SCNC: 103 MMOL/L (ref 98–107)
CO2 SERPL-SCNC: 28 MMOL/L (ref 21–32)
CREAT SERPL-MCNC: 1.16 MG/DL (ref 0.6–1)
DIFFERENTIAL METHOD BLD: ABNORMAL
EOSINOPHIL # BLD: 0 K/UL (ref 0–0.8)
EOSINOPHIL NFR BLD: 0 % (ref 0.5–7.8)
ERYTHROCYTE [DISTWIDTH] IN BLOOD BY AUTOMATED COUNT: 15 % (ref 11.9–14.6)
GLOBULIN SER CALC-MCNC: 3.4 G/DL (ref 2.3–3.5)
GLUCOSE SERPL-MCNC: 104 MG/DL (ref 65–100)
HCT VFR BLD AUTO: 39.5 % (ref 35.8–46.3)
HGB BLD-MCNC: 12.1 G/DL (ref 11.7–15.4)
IMM GRANULOCYTES # BLD AUTO: 0.1 K/UL (ref 0–0.5)
IMM GRANULOCYTES NFR BLD AUTO: 0 % (ref 0–5)
LIPASE SERPL-CCNC: 62 U/L (ref 73–393)
LYMPHOCYTES # BLD: 2 K/UL (ref 0.5–4.6)
LYMPHOCYTES NFR BLD: 16 % (ref 13–44)
MAGNESIUM SERPL-MCNC: 1.7 MG/DL (ref 1.8–2.4)
MCH RBC QN AUTO: 25.9 PG (ref 26.1–32.9)
MCHC RBC AUTO-ENTMCNC: 30.6 G/DL (ref 31.4–35)
MCV RBC AUTO: 84.6 FL (ref 79.6–97.8)
MONOCYTES # BLD: 0.6 K/UL (ref 0.1–1.3)
MONOCYTES NFR BLD: 4 % (ref 4–12)
NEUTS SEG # BLD: 10.1 K/UL (ref 1.7–8.2)
NEUTS SEG NFR BLD: 79 % (ref 43–78)
NRBC # BLD: 0 K/UL (ref 0–0.2)
PLATELET # BLD AUTO: 352 K/UL (ref 150–450)
PMV BLD AUTO: 9.5 FL (ref 9.4–12.3)
POTASSIUM SERPL-SCNC: 2.9 MMOL/L (ref 3.5–5.1)
PROT SERPL-MCNC: 6.3 G/DL (ref 6.3–8.2)
RBC # BLD AUTO: 4.67 M/UL (ref 4.05–5.2)
SODIUM SERPL-SCNC: 142 MMOL/L (ref 136–145)
WBC # BLD AUTO: 12.8 K/UL (ref 4.3–11.1)

## 2022-04-28 PROCEDURE — 74011250637 HC RX REV CODE- 250/637: Performed by: EMERGENCY MEDICINE

## 2022-04-28 PROCEDURE — 74011250636 HC RX REV CODE- 250/636: Performed by: EMERGENCY MEDICINE

## 2022-04-28 PROCEDURE — 96375 TX/PRO/DX INJ NEW DRUG ADDON: CPT

## 2022-04-28 PROCEDURE — 83735 ASSAY OF MAGNESIUM: CPT

## 2022-04-28 PROCEDURE — 83690 ASSAY OF LIPASE: CPT

## 2022-04-28 PROCEDURE — 80053 COMPREHEN METABOLIC PANEL: CPT

## 2022-04-28 PROCEDURE — 96361 HYDRATE IV INFUSION ADD-ON: CPT

## 2022-04-28 PROCEDURE — 96374 THER/PROPH/DIAG INJ IV PUSH: CPT

## 2022-04-28 PROCEDURE — 81003 URINALYSIS AUTO W/O SCOPE: CPT

## 2022-04-28 PROCEDURE — 73630 X-RAY EXAM OF FOOT: CPT

## 2022-04-28 PROCEDURE — 99284 EMERGENCY DEPT VISIT MOD MDM: CPT

## 2022-04-28 PROCEDURE — 85025 COMPLETE CBC W/AUTO DIFF WBC: CPT

## 2022-04-28 RX ORDER — LANOLIN ALCOHOL/MO/W.PET/CERES
400 CREAM (GRAM) TOPICAL
Status: COMPLETED | OUTPATIENT
Start: 2022-04-28 | End: 2022-04-28

## 2022-04-28 RX ORDER — ONDANSETRON 2 MG/ML
4 INJECTION INTRAMUSCULAR; INTRAVENOUS
Status: COMPLETED | OUTPATIENT
Start: 2022-04-28 | End: 2022-04-28

## 2022-04-28 RX ORDER — SODIUM CHLORIDE 0.9 % (FLUSH) 0.9 %
5-10 SYRINGE (ML) INJECTION AS NEEDED
Status: DISCONTINUED | OUTPATIENT
Start: 2022-04-28 | End: 2022-04-28 | Stop reason: HOSPADM

## 2022-04-28 RX ORDER — HYDROMORPHONE HYDROCHLORIDE 1 MG/ML
1 INJECTION, SOLUTION INTRAMUSCULAR; INTRAVENOUS; SUBCUTANEOUS
Status: COMPLETED | OUTPATIENT
Start: 2022-04-28 | End: 2022-04-28

## 2022-04-28 RX ORDER — POTASSIUM CHLORIDE 20 MEQ/1
40 TABLET, EXTENDED RELEASE ORAL
Status: COMPLETED | OUTPATIENT
Start: 2022-04-28 | End: 2022-04-28

## 2022-04-28 RX ORDER — MORPHINE SULFATE 4 MG/ML
4 INJECTION INTRAVENOUS
Status: COMPLETED | OUTPATIENT
Start: 2022-04-28 | End: 2022-04-28

## 2022-04-28 RX ORDER — SODIUM CHLORIDE 0.9 % (FLUSH) 0.9 %
5-10 SYRINGE (ML) INJECTION EVERY 8 HOURS
Status: DISCONTINUED | OUTPATIENT
Start: 2022-04-28 | End: 2022-04-28 | Stop reason: HOSPADM

## 2022-04-28 RX ADMIN — ONDANSETRON 4 MG: 2 INJECTION INTRAMUSCULAR; INTRAVENOUS at 13:52

## 2022-04-28 RX ADMIN — MAGNESIUM GLUCONATE 500 MG ORAL TABLET 400 MG: 500 TABLET ORAL at 15:14

## 2022-04-28 RX ADMIN — SODIUM CHLORIDE, SODIUM LACTATE, POTASSIUM CHLORIDE, AND CALCIUM CHLORIDE 1000 ML: 600; 310; 30; 20 INJECTION, SOLUTION INTRAVENOUS at 13:52

## 2022-04-28 RX ADMIN — HYDROMORPHONE HYDROCHLORIDE 1 MG: 1 INJECTION, SOLUTION INTRAMUSCULAR; INTRAVENOUS; SUBCUTANEOUS at 15:17

## 2022-04-28 RX ADMIN — MORPHINE SULFATE 4 MG: 4 INJECTION INTRAVENOUS at 13:52

## 2022-04-28 RX ADMIN — POTASSIUM CHLORIDE 40 MEQ: 20 TABLET, EXTENDED RELEASE ORAL at 15:14

## 2022-04-28 NOTE — ED NOTES
I have reviewed discharge instructions with the patient. The patient verbalized understanding. Patient left ED via Discharge Method: wheelchair to Home with mother. Opportunity for questions and clarification provided. Patient given 0 scripts. To continue your aftercare when you leave the hospital, you may receive an automated call from our care team to check in on how you are doing. This is a free service and part of our promise to provide the best care and service to meet your aftercare needs.  If you have questions, or wish to unsubscribe from this service please call [phone]. Thank you for Choosing our [address] [place] [address] Emergency Department.

## 2022-04-28 NOTE — ED PROVIDER NOTES
Mask was worn during the entire patient examination. Diane Abraham is a 42 y.o. female who presents to the ED with a chief complaint of right foot pain. States she has had some stress fractures last month on x-ray at her orthopedics office. She was wearing a boot but he told her she could take it out of it. She has not been using it but is starting to hurt more. Also coming in with vomiting and diarrhea. The symptoms started yesterday and are associated with some epigastric pain and feel similar to when she had pancreatitis. She has a history of recurrent pancreatitis that is chronic. The history is provided by the patient. Foot Pain   Pertinent negatives include no numbness, no back pain and no neck pain. Vomiting   Associated symptoms include abdominal pain, diarrhea and arthralgias. Pertinent negatives include no chills, no fever and no myalgias. Diarrhea   Associated symptoms include diarrhea, vomiting and arthralgias. Pertinent negatives include no fever, no myalgias, no chest pain and no back pain.         Past Medical History:   Diagnosis Date    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    Arthritis     Aseptic necrosis of head of humerus ([facility] [address] 12/8/2011    Asthma     uses MDI as needed - rarely-pt can't remember last time needed    Avascular necrosis of femur head     right hip, left hip replacements    Chronic pain     Endocrine disease diagnosed 2003    ADDISONS DISEASE    Endometriosis     hysterectomy    GERD (gastroesophageal reflux disease)     Infectious disease 2007    MRSA-2007, community acquired - in hospital ([facility]) about 6 months - 3 months of which was in coma;  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     Nausea & vomiting     Other ill-defined conditions(029.89)     allergies    Pancreatitis chronic     7 episodes    Unspecified adverse effect of anesthesia     pt reports \"difficult to sedate       Past Surgical History:   Procedure Laterality Date    HX APPENDECTOMY      HX CHOLECYSTECTOMY      HX GYN      hysterectomy    HX ORTHOPAEDIC  2010    right EDIN    HX ORTHOPAEDIC  2010    Left  EDIN    HX OTHER SURGICAL  2007    I&D of 9 abcesses -MRSA    HX SHOULDER REPLACEMENT  12/2011    Right    HX SHOULDER REPLACEMENT  2015    left    HX TONSILLECTOMY           Family History:   Problem Relation Age of Onset    Cancer Mother         THROID CANCER    Diabetes Maternal Grandmother     Stroke Maternal Grandfather     Heart Disease Paternal Grandmother        Social History     Socioeconomic History    Marital status: SINGLE     Spouse name: Not on file    Number of children: Not on file    Years of education: Not on file    Highest education level: Not on file   Occupational History    Not on file   Tobacco Use    Smoking status: Never Smoker    Smokeless tobacco: Never Used   Substance and Sexual Activity    Alcohol use: No    Drug use: No    Sexual activity: Not Currently   Other Topics Concern    Not on file   Social History Narrative    Not on file     Social Determinants of Health     Financial Resource Strain:     Difficulty of Paying Living Expenses: Not on file   Food Insecurity:     Worried About Running Out of Food in the Last Year: Not on file    Hussein of Food in the Last Year: Not on file   Transportation Needs:     Lack of Transportation (Medical): Not on file    Lack of Transportation (Non-Medical):  Not on file   Physical Activity:     Days of Exercise per Week: Not on file    Minutes of Exercise per Session: Not on file   Stress:     Feeling of Stress : Not on file   Social Connections:     Frequency of Communication with Friends and Family: Not on file    Frequency of Social Gatherings with Friends and Family: Not on file    Attends Hoahaoism Services: Not on file    Active Member of Clubs or Organizations: Not on file    Attends Club or Organization Meetings: Not on file    Marital Status: Not on file   Intimate Partner Violence:     Fear of Current or Ex-Partner: Not on file    Emotionally Abused: Not on file    Physically Abused: Not on file    Sexually Abused: Not on file   Housing Stability:     Unable to Pay for Housing in the Last Year: Not on file    Number of [place] in the Last Year: Not on file    Unstable Housing in the Last Year: Not on file         ALLERGIES: Latex, [place] oil, Brazil nut, Peanut, Shellfish containing products, Beef containing products, Chicken derived, Chlorpromazine, Clindamycin, Dopamine receptor agonist, Gluten, Hydroxyzine pamoate, Ibuprofen, Iodine, Naproxen sodium, Nubain [nalbuphine], Other medication, Pcn [penicillins], Povidone-iodine, Prochlorperazine edisylate, Promethazine, Reglan [metoclopramide], Soy, Sulfa (sulfonamide antibiotics), Toradol [ketorolac tromethamine], Tramadol, and Zithromax [azithromycin]    Review of Systems   Constitutional: Negative for activity change, appetite change, chills, fatigue and fever. Respiratory: Negative for apnea, chest tightness, shortness of breath, wheezing and stridor. Cardiovascular: Negative for chest pain and palpitations. Gastrointestinal: Positive for abdominal pain, diarrhea and vomiting. Negative for abdominal distention and anal bleeding. Musculoskeletal: Positive for arthralgias and gait problem. Negative for back pain, myalgias, neck pain and neck stiffness. Skin: Negative for color change, pallor and wound. Neurological: Negative for dizziness, facial asymmetry, weakness and numbness. Psychiatric/Behavioral: Negative for agitation. All other systems reviewed and are negative. Vitals:    04/28/22 1330   BP: (!) 153/90   Pulse: (!) 117   Resp: 18   Temp: 98.4 °F (36.9 °C)   SpO2: 98%   Weight: 77.1 kg (170 lb)   Height: 5' 4\" (1.626 m)            Physical Exam  Vitals and nursing note reviewed. Constitutional:       General: She is not in acute distress. Appearance: Normal appearance. She is well-developed.  She is not ill-appearing, toxic-appearing or diaphoretic. HENT:      Head: Normocephalic and atraumatic. Eyes:      General: No scleral icterus. Conjunctiva/sclera: Conjunctivae normal.   Cardiovascular:      Comments: Strong pulses to right distal extremity. Pulmonary:      Effort: Pulmonary effort is normal. No respiratory distress. Breath sounds: Normal breath sounds. No stridor. No wheezing, rhonchi or rales. Abdominal:      General: Abdomen is flat. There is no distension. Palpations: Abdomen is soft. There is no mass. Tenderness: There is abdominal tenderness (epigastric tenderness). There is no guarding or rebound. Hernia: No hernia is present. Musculoskeletal:      Cervical back: Normal range of motion and neck supple. No rigidity or tenderness. Comments: No deformity to the foot. Mild diffuse metatarsal tenderness. Skin:     General: Skin is warm and dry. Capillary Refill: Capillary refill takes less than 2 seconds. Coloration: Skin is not jaundiced or pale. Findings: No bruising or erythema. Neurological:      General: No focal deficit present. Mental Status: She is alert and oriented to person, place, and time. Mental status is at baseline. Psychiatric:         Mood and Affect: Mood normal.         Behavior: Behavior normal.          MDM  Number of Diagnoses or Management Options  Diagnosis management comments: Patient will go back into her boot for the chronic foot fractures. I reviewed records from outside hospital she does have history of chronic pancreatitis and ED visits even from 4 days ago. I am giving her second dose of pain medicine replacing electrolytes and she has Zofran ODT at home. We will discharge her after IV fluid administration. Rubén Lazaro MD; 4/28/2022 @3:25 PM Voice dictation software was used during the making of this note. This software is not perfect and grammatical and other typographical errors may be present. This note has not been proofread for errors.  ====================================        Amount and/or Complexity of Data Reviewed  Clinical lab tests: ordered and reviewed (Results for orders placed or performed during the hospital encounter of 04/28/22  -CBC WITH AUTOMATED DIFF:        Result                      Value             Ref Range           WBC                         12.8 (H)          4.3 - 11.1 K*       RBC                         4.67              4.05 - 5.2 M*       HGB                         12.1              11.7 - 15.4 *       HCT                         39.5              35.8 - 46.3 %       MCV                         84.6              79.6 - 97.8 *       MCH                         25.9 (L)          26.1 - 32.9 *       MCHC                        30.6 (L)          31.4 - 35.0 *       RDW                         15.0 (H)          11.9 - 14.6 %       PLATELET                    352               150 - 450 K/*       MPV                         9.5               9.4 - 12.3 FL       ABSOLUTE NRBC               0.00              0.0 - 0.2 K/*       DF                          AUTOMATED                             NEUTROPHILS                 79 (H)            43 - 78 %           LYMPHOCYTES                 16                13 - 44 %           MONOCYTES                   4                 4.0 - 12.0 %        EOSINOPHILS                 0 (L)             0.5 - 7.8 %         BASOPHILS                   0                 0.0 - 2.0 %         IMMATURE GRANULOCYTES       0                 0.0 - 5.0 %         ABS. NEUTROPHILS            10.1 (H)          1.7 - 8.2 K/*       ABS. LYMPHOCYTES            2.0               0.5 - 4.6 K/*       ABS. MONOCYTES              0.6               0.1 - 1.3 K/*       ABS. EOSINOPHILS            0.0               0.0 - 0.8 K/*       ABS. BASOPHILS              0.1               0.0 - 0.2 K/*       ABS. IMM.  GRANS.            0.1               0.0 - 0.5 K/*  -LIPASE: Result                      Value             Ref Range           Lipase                      62 (L)            73 - 750 U/L   -METABOLIC PANEL, COMPREHENSIVE:        Result                      Value             Ref Range           Sodium                      142               136 - 145 mm*       Potassium                   2.9 (L)           3.5 - 5.1 mm*       Chloride                    103               98 - 107 mmo*       CO2                         28                21 - 32 mmol*       Anion gap                   11                7 - 16 mmol/L       Glucose                     104 (H)           65 - 100 mg/*       BUN                         8                 6 - 23 MG/DL        Creatinine                  1.16 (H)          0.6 - 1.0 MG*       GFR est AA                  >60               >60 ml/min/1*       GFR est non-AA              54 (L)            >60 ml/min/1*       Calcium                     8.5               8.3 - 10.4 M*       Bilirubin, total            0.3               0.2 - 1.1 MG*       ALT (SGPT)                  19                12 - 65 U/L         AST (SGOT)                  16                15 - 37 U/L         Alk. phosphatase            50                50 - 130 U/L        Protein, total              6.3               6.3 - 8.2 g/*       Albumin                     2.9 (L)           3.5 - 5.0 g/*       Globulin                    3.4               2.3 - 3.5 g/*       A-G Ratio                   0.9 (L)           1.2 - 3.5      -MAGNESIUM:        Result                      Value             Ref Range           Magnesium                   1.7 (L)           1.8 - 2.4 mg* )  Tests in the radiology section of CPT®: ordered and reviewed (XR FOOT RT MIN 3 V    Result Date: 4/28/2022  RIGHT FOOT SERIES HISTORY: Dorsal foot pain x2 days FINDINGS: There is a deformity of the third metatarsal may be caused by an old fracture.  A sclerotic linear lesion at the base of fifth metatarsal may be an insufficiency fracture or old injury.  Soft tissues are normal.     Suspected old fracture of the third metatarsal and possible insufficiency fracture or old injury of the fifth metatarsal.   )           Procedures

## 2022-04-28 NOTE — ED NOTES
Patient is here with right foot pain for a day and a half, worse with weight bearing. N/V/D last night. +Tenderness to palpation over the dorsum of the right foot and epigastric area of abdomen. Patient evaluated initially in triage. Rapid Medical Evaluation was conducted and necessary orders have been placed. I have performed a medical screening exam.  Care will now be transferred to the provider in the back of the emergency department.   ANUEL Walter 1:29 PM

## 2022-04-28 NOTE — ED TRIAGE NOTES
Pt c/o R foot pain for the past 2 days, no known injury. Also c/o RUQ abd pain with N/V/D since this morning.

## 2022-05-15 ENCOUNTER — HOSPITAL ENCOUNTER (EMERGENCY)
Age: 42
Discharge: STILL ADMITTED OR ADMINISTRATIVE | End: 2022-05-15

## 2022-05-21 ENCOUNTER — HOSPITAL ENCOUNTER (EMERGENCY)
Age: 42
Discharge: HOME | End: 2022-05-21
Attending: EMERGENCY MEDICINE
Payer: MEDICAID

## 2022-05-21 ENCOUNTER — HOSPITAL ENCOUNTER (EMERGENCY)
Dept: GENERAL RADIOLOGY | Age: 42
Discharge: HOME | End: 2022-05-24
Payer: MEDICAID

## 2022-05-21 VITALS
SYSTOLIC BLOOD PRESSURE: 116 MMHG | HEART RATE: 97 BPM | BODY MASS INDEX: 29.02 KG/M2 | HEIGHT: 64 IN | WEIGHT: 170 LBS | TEMPERATURE: 98.7 F | OXYGEN SATURATION: 99 % | RESPIRATION RATE: 20 BRPM | DIASTOLIC BLOOD PRESSURE: 86 MMHG

## 2022-05-21 DIAGNOSIS — E87.6 HYPOKALEMIA: ICD-10-CM

## 2022-05-21 DIAGNOSIS — R11.2 INTRACTABLE VOMITING WITH NAUSEA, UNSPECIFIED VOMITING TYPE: Primary | ICD-10-CM

## 2022-05-21 LAB
ALBUMIN SERPL-MCNC: 2.6 G/DL (ref 3.5–5)
ALBUMIN/GLOB SERPL: 0.6 {RATIO} (ref 1.2–3.5)
ALP SERPL-CCNC: 66 U/L (ref 50–130)
ALT SERPL-CCNC: 20 U/L (ref 12–65)
ANION GAP SERPL CALC-SCNC: 10 MMOL/L (ref 7–16)
AST SERPL-CCNC: 20 U/L (ref 15–37)
BASOPHILS # BLD: 0 K/UL (ref 0–0.2)
BASOPHILS NFR BLD: 0 % (ref 0–2)
BILIRUB SERPL-MCNC: 0.5 MG/DL (ref 0.2–1.1)
BUN SERPL-MCNC: 9 MG/DL (ref 6–23)
CALCIUM SERPL-MCNC: 8.3 MG/DL (ref 8.3–10.4)
CHLORIDE SERPL-SCNC: 98 MMOL/L (ref 98–107)
CO2 SERPL-SCNC: 34 MMOL/L (ref 21–32)
CREAT SERPL-MCNC: 1.09 MG/DL (ref 0.6–1)
DIFFERENTIAL METHOD BLD: ABNORMAL
EOSINOPHIL # BLD: 0 K/UL (ref 0–0.8)
EOSINOPHIL NFR BLD: 0 % (ref 0.5–7.8)
ERYTHROCYTE [DISTWIDTH] IN BLOOD BY AUTOMATED COUNT: 15.2 % (ref 11.9–14.6)
GLOBULIN SER CALC-MCNC: 4.3 G/DL (ref 2.3–3.5)
GLUCOSE SERPL-MCNC: 90 MG/DL (ref 65–100)
HCT VFR BLD AUTO: 34 % (ref 35.8–46.3)
HGB BLD-MCNC: 10 G/DL (ref 11.7–15.4)
IMM GRANULOCYTES # BLD AUTO: 0.1 K/UL (ref 0–0.5)
IMM GRANULOCYTES NFR BLD AUTO: 1 % (ref 0–5)
LIPASE SERPL-CCNC: 46 U/L (ref 73–393)
LYMPHOCYTES # BLD: 1.5 K/UL (ref 0.5–4.6)
LYMPHOCYTES NFR BLD: 12 % (ref 13–44)
MCH RBC QN AUTO: 26 PG (ref 26.1–32.9)
MCHC RBC AUTO-ENTMCNC: 29.4 G/DL (ref 31.4–35)
MCV RBC AUTO: 88.5 FL (ref 79.6–97.8)
MONOCYTES # BLD: 0.7 K/UL (ref 0.1–1.3)
MONOCYTES NFR BLD: 5 % (ref 4–12)
NEUTS SEG # BLD: 10.2 K/UL (ref 1.7–8.2)
NEUTS SEG NFR BLD: 82 % (ref 43–78)
NRBC # BLD: 0 K/UL (ref 0–0.2)
PLATELET # BLD AUTO: 410 K/UL (ref 150–450)
PMV BLD AUTO: 9 FL (ref 9.4–12.3)
POTASSIUM SERPL-SCNC: 3 MMOL/L (ref 3.5–5.1)
PROT SERPL-MCNC: 6.9 G/DL (ref 6.3–8.2)
RBC # BLD AUTO: 3.84 M/UL (ref 4.05–5.2)
SODIUM SERPL-SCNC: 142 MMOL/L (ref 136–145)
WBC # BLD AUTO: 12.4 K/UL (ref 4.3–11.1)

## 2022-05-21 PROCEDURE — 80053 COMPREHEN METABOLIC PANEL: CPT

## 2022-05-21 PROCEDURE — 6360000002 HC RX W HCPCS: Performed by: EMERGENCY MEDICINE

## 2022-05-21 PROCEDURE — 83690 ASSAY OF LIPASE: CPT

## 2022-05-21 PROCEDURE — 96375 TX/PRO/DX INJ NEW DRUG ADDON: CPT

## 2022-05-21 PROCEDURE — 2500000003 HC RX 250 WO HCPCS: Performed by: EMERGENCY MEDICINE

## 2022-05-21 PROCEDURE — 96361 HYDRATE IV INFUSION ADD-ON: CPT

## 2022-05-21 PROCEDURE — 96374 THER/PROPH/DIAG INJ IV PUSH: CPT

## 2022-05-21 PROCEDURE — 99284 EMERGENCY DEPT VISIT MOD MDM: CPT

## 2022-05-21 PROCEDURE — 85025 COMPLETE CBC W/AUTO DIFF WBC: CPT

## 2022-05-21 PROCEDURE — 2580000003 HC RX 258: Performed by: EMERGENCY MEDICINE

## 2022-05-21 PROCEDURE — 73030 X-RAY EXAM OF SHOULDER: CPT

## 2022-05-21 RX ORDER — HYDROMORPHONE HYDROCHLORIDE 1 MG/ML
1 INJECTION, SOLUTION INTRAMUSCULAR; INTRAVENOUS; SUBCUTANEOUS
Status: COMPLETED | OUTPATIENT
Start: 2022-05-21 | End: 2022-05-21

## 2022-05-21 RX ORDER — 0.9 % SODIUM CHLORIDE 0.9 %
1000 INTRAVENOUS SOLUTION INTRAVENOUS
Status: COMPLETED | OUTPATIENT
Start: 2022-05-21 | End: 2022-05-21

## 2022-05-21 RX ORDER — ONDANSETRON 2 MG/ML
4 INJECTION INTRAMUSCULAR; INTRAVENOUS
Status: COMPLETED | OUTPATIENT
Start: 2022-05-21 | End: 2022-05-21

## 2022-05-21 RX ORDER — SODIUM CHLORIDE 0.9 % (FLUSH) 0.9 %
3 SYRINGE (ML) INJECTION EVERY 8 HOURS
Status: DISCONTINUED | OUTPATIENT
Start: 2022-05-21 | End: 2022-05-21 | Stop reason: HOSPADM

## 2022-05-21 RX ORDER — 0.9 % SODIUM CHLORIDE 0.9 %
500 INTRAVENOUS SOLUTION INTRAVENOUS
Status: COMPLETED | OUTPATIENT
Start: 2022-05-21 | End: 2022-05-21

## 2022-05-21 RX ADMIN — HYDROCORTISONE SODIUM SUCCINATE 100 MG: 100 INJECTION, POWDER, FOR SOLUTION INTRAMUSCULAR; INTRAVENOUS at 13:16

## 2022-05-21 RX ADMIN — SODIUM CHLORIDE 1000 ML: 9 INJECTION, SOLUTION INTRAVENOUS at 13:15

## 2022-05-21 RX ADMIN — SODIUM CHLORIDE 500 ML: 900 INJECTION, SOLUTION INTRAVENOUS at 15:44

## 2022-05-21 RX ADMIN — ONDANSETRON 4 MG: 2 INJECTION INTRAMUSCULAR; INTRAVENOUS at 15:44

## 2022-05-21 RX ADMIN — ONDANSETRON 4 MG: 2 INJECTION INTRAMUSCULAR; INTRAVENOUS at 13:15

## 2022-05-21 RX ADMIN — HYDROMORPHONE HYDROCHLORIDE 1 MG: 1 INJECTION, SOLUTION INTRAMUSCULAR; INTRAVENOUS; SUBCUTANEOUS at 15:44

## 2022-05-21 ASSESSMENT — PAIN SCALES - GENERAL: PAINLEVEL_OUTOF10: 8

## 2022-05-21 ASSESSMENT — PAIN - FUNCTIONAL ASSESSMENT: PAIN_FUNCTIONAL_ASSESSMENT: 0-10

## 2022-05-21 ASSESSMENT — ENCOUNTER SYMPTOMS
VOMITING: 1
NAUSEA: 1
DIARRHEA: 0

## 2022-05-21 NOTE — ED PROVIDER NOTES
[employer] Emergency Department Provider Note                     PCP:                Ivory Vo MD               Age: 42 y.o. Sex: female           ICD-10-CM    1. Intractable vomiting with nausea, unspecified vomiting type  R11.2    2. Hypokalemia  E87.6        DISPOSITION Decision To Discharge 05/21/2022 03:35:25 PM       New Prescriptions    No medications on file         Orders Placed This Encounter   Procedures    XR SHOULDER LEFT (MIN 2 VIEWS)    CBC with Diff    CMP    Lipase    Diet NPO    POCT Urine Dipstick    Saline lock IV        Ivory Vo MD  [address] [address]  [phone]    Schedule an appointment as soon as possible for a visit          Lea Chaves is a 42 y.o. 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 She states she went home from the hospital 3 days ago and started not feeling well. She has been vomiting and unable to keep her pain medication down. She also takes Solu-Cortef which she has been unable to keep down. She denies any diarrhea or fever. Review of Systems   Constitutional: Negative for chills and fever. Gastrointestinal: Positive for nausea and vomiting. Negative for diarrhea. All other systems reviewed and are negative. All other systems reviewed and are negative.       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     Nausea & vomiting     Other ill-defined conditions(439.89)     allergies    Unspecified adverse effect of anesthesia     pt reports \"difficult to sedate        Past Surgical History:   Procedure Laterality Date    APPENDECTOMY      CHOLECYSTECTOMY      GYN      hysterectomy    ORTHOPEDIC SURGERY  [place]    right [place]    ORTHOPEDIC SURGERY  [place]    Left  [place]    OTHER SURGICAL HISTORY  2007    I&D of 9 abcesses -MRSA    SHOULDER ARTHROPLASTY  2015    left    SHOULDER ARTHROPLASTY  12/2011    Right    TONSILLECTOMY          Family History   Problem Relation Age of Onset    Stroke Maternal Grandfather     Diabetes Maternal Grandmother     Heart Disease Paternal Grandmother     Cancer Mother         THROID CANCER           Social Connections:     Frequency of Communication with Friends and Family: Not on file    Frequency of Social Gatherings with Friends and Family: Not on file    Attends Episcopalian Services: Not on file    Active Member of Clubs or Organizations: Not on file    Attends Club or Organization Meetings: Not on file    Marital Status: Not on file        Allergies   Allergen Reactions    Latex Anaphylaxis    [place] Oil Anaphylaxis    [place] Nut (Berthollefia [place]) Skin Test Anaphylaxis    Metoclopramide Anaphylaxis     dystonia    Chlorpromazine Other (See Comments)     Dystonic    Gluten Meal Other (See Comments)     Per allergy testing    Hydroxyzine Pamoate Other (See Comments)     DYSTONIC    Prochlorperazine Edisylate Other (See Comments)     Dystonic    Promethazine Other (See Comments)     Dystonic    Azithromycin Rash    Clindamycin Rash    Ibuprofen Photosensitivity and Rash    Iodine Rash    Ketorolac Tromethamine Rash    Nalbuphine Rash    Naproxen Sodium Rash    Penicillins Rash    Sulfa Antibiotics Rash    Tramadol Rash        Vitals signs and nursing note reviewed. No data found. Physical Exam  Vitals and nursing note reviewed. Constitutional:       General: She is not in acute distress. Appearance: Normal appearance. HENT:      Head: Normocephalic and atraumatic. Eyes:      General:         Right eye: No discharge. Left eye: No discharge. Conjunctiva/sclera: Conjunctivae normal.   Cardiovascular:      Rate and Rhythm: Normal rate and regular rhythm. Pulmonary:      Effort: Pulmonary effort is normal. No respiratory distress. Abdominal:      General: There is no distension. Palpations: Abdomen is soft. Tenderness: There is no abdominal tenderness. Musculoskeletal:      Right lower leg: No edema. Left lower leg: No edema. Skin:     General: Skin is warm. Neurological:      Mental Status: She is alert. Psychiatric:         Mood and Affect: Mood normal.         Behavior: Behavior normal.          Procedures    Labs Reviewed   CBC WITH AUTO DIFFERENTIAL - Abnormal; Notable for the following components:       Result Value    WBC 12.4 (*)     RBC 3.84 (*)     Hemoglobin 10.0 (*)     Hematocrit 34.0 (*)     MCH 26.0 (*)     MCHC 29.4 (*)     RDW 15.2 (*)     MPV 9.0 (*)     Seg Neutrophils 82 (*)     Lymphocytes 12 (*)     Eosinophils % 0 (*)     Segs Absolute 10.2 (*)     All other components within normal limits   COMPREHENSIVE METABOLIC PANEL - Abnormal; Notable for the following components:    Potassium 3.0 (*)     CO2 34 (*)     CREATININE 1.09 (*)     GFR Non- 59 (*)     Albumin 2.6 (*)     Globulin 4.3 (*)     Albumin/Globulin Ratio 0.6 (*)     All other components within normal limits   LIPASE - Abnormal; Notable for the following components:    Lipase 46 (*)     All other components within normal limits        XR SHOULDER LEFT (MIN 2 VIEWS)   Final Result   1. No acute osseous abnormality.  Minimal gas is seen adjacent to the proximal   humeral diaphysis. Currently, the hospital information system is down and I   cannot confirm that the patient has had a recent surgery. It should be confirmed   that the patient has had a recent surgery. Otherwise, this could indicate a gas   producing infection. CIWA Assessment  BP: 116/86  Pulse: 97                        MDM  Number of Diagnoses or Management Options  Hypokalemia: new, no workup  Intractable vomiting with nausea, unspecified vomiting type: new, no workup  Diagnosis management comments: 3:32 PM discussed results with patient and mother, unremarkable labs other than her mild hypokalemia. She still has significant nausea and pain. Per prescription monitoring program, patient is currently under the care of pain management and received Dilaudid and morphine after her surgery. We will treat her symptomatically and discharge her home. Amount and/or Complexity of Data Reviewed  Clinical lab tests: ordered and reviewed  Tests in the radiology section of CPT®: ordered and reviewed  Decide to obtain previous medical records or to obtain history from someone other than the patient: yes  Review and summarize past medical records: yes    Risk of Complications, Morbidity, and/or Mortality  Presenting problems: moderate  Diagnostic procedures: moderate  Management options: moderate    Patient Progress  Patient progress: improved       Voice dictation software was used during the making of this note. This software is not perfect and grammatical and other typographical errors may be present. This note has not been completely proofread for errors.       Monica Scott MD  05/21/22 2024

## 2022-06-05 ENCOUNTER — HOSPITAL ENCOUNTER (EMERGENCY)
Age: 42
Discharge: HOME | End: 2022-06-06
Attending: STUDENT IN AN ORGANIZED HEALTH CARE EDUCATION/TRAINING PROGRAM
Payer: MEDICAID

## 2022-06-05 DIAGNOSIS — R10.84 GENERALIZED ABDOMINAL PAIN: ICD-10-CM

## 2022-06-05 DIAGNOSIS — R11.2 NON-INTRACTABLE VOMITING WITH NAUSEA, UNSPECIFIED VOMITING TYPE: Primary | ICD-10-CM

## 2022-06-05 LAB
ALBUMIN SERPL-MCNC: 2.8 G/DL (ref 3.5–5)
ALBUMIN/GLOB SERPL: 0.7 {RATIO} (ref 1.2–3.5)
ALP SERPL-CCNC: 73 U/L (ref 50–130)
ALT SERPL-CCNC: 13 U/L (ref 12–65)
ANION GAP SERPL CALC-SCNC: 8 MMOL/L (ref 7–16)
AST SERPL-CCNC: 11 U/L (ref 15–37)
BASOPHILS # BLD: 0 K/UL (ref 0–0.2)
BASOPHILS NFR BLD: 0 % (ref 0–2)
BILIRUB SERPL-MCNC: 0.3 MG/DL (ref 0.2–1.1)
BUN SERPL-MCNC: 16 MG/DL (ref 6–23)
CALCIUM SERPL-MCNC: 9.2 MG/DL (ref 8.3–10.4)
CHLORIDE SERPL-SCNC: 102 MMOL/L (ref 98–107)
CO2 SERPL-SCNC: 30 MMOL/L (ref 21–32)
CREAT SERPL-MCNC: 1.44 MG/DL (ref 0.6–1)
DIFFERENTIAL METHOD BLD: ABNORMAL
EOSINOPHIL # BLD: 0 K/UL (ref 0–0.8)
EOSINOPHIL NFR BLD: 0 % (ref 0.5–7.8)
ERYTHROCYTE [DISTWIDTH] IN BLOOD BY AUTOMATED COUNT: 14.1 % (ref 11.9–14.6)
GLOBULIN SER CALC-MCNC: 4.1 G/DL (ref 2.3–3.5)
GLUCOSE SERPL-MCNC: 101 MG/DL (ref 65–100)
HCG UR QL: NEGATIVE
HCT VFR BLD AUTO: 34.3 % (ref 35.8–46.3)
HGB BLD-MCNC: 10.2 G/DL (ref 11.7–15.4)
IMM GRANULOCYTES # BLD AUTO: 0 K/UL (ref 0–0.5)
IMM GRANULOCYTES NFR BLD AUTO: 0 % (ref 0–5)
LIPASE SERPL-CCNC: 50 U/L (ref 73–393)
LYMPHOCYTES # BLD: 2 K/UL (ref 0.5–4.6)
LYMPHOCYTES NFR BLD: 17 % (ref 13–44)
MCH RBC QN AUTO: 25.9 PG (ref 26.1–32.9)
MCHC RBC AUTO-ENTMCNC: 29.7 G/DL (ref 31.4–35)
MCV RBC AUTO: 87.1 FL (ref 79.6–97.8)
MONOCYTES # BLD: 0.6 K/UL (ref 0.1–1.3)
MONOCYTES NFR BLD: 5 % (ref 4–12)
NEUTS SEG # BLD: 9.4 K/UL (ref 1.7–8.2)
NEUTS SEG NFR BLD: 78 % (ref 43–78)
NRBC # BLD: 0 K/UL (ref 0–0.2)
PLATELET # BLD AUTO: 406 K/UL (ref 150–450)
PMV BLD AUTO: 9.3 FL (ref 9.4–12.3)
POTASSIUM SERPL-SCNC: 3.6 MMOL/L (ref 3.5–5.1)
PROT SERPL-MCNC: 6.9 G/DL (ref 6.3–8.2)
RBC # BLD AUTO: 3.94 M/UL (ref 4.05–5.2)
SODIUM SERPL-SCNC: 140 MMOL/L (ref 136–145)
WBC # BLD AUTO: 12.2 K/UL (ref 4.3–11.1)

## 2022-06-05 PROCEDURE — 96376 TX/PRO/DX INJ SAME DRUG ADON: CPT

## 2022-06-05 PROCEDURE — 96374 THER/PROPH/DIAG INJ IV PUSH: CPT

## 2022-06-05 PROCEDURE — 99284 EMERGENCY DEPT VISIT MOD MDM: CPT

## 2022-06-05 PROCEDURE — 6360000002 HC RX W HCPCS: Performed by: STUDENT IN AN ORGANIZED HEALTH CARE EDUCATION/TRAINING PROGRAM

## 2022-06-05 PROCEDURE — 83690 ASSAY OF LIPASE: CPT

## 2022-06-05 PROCEDURE — 80053 COMPREHEN METABOLIC PANEL: CPT

## 2022-06-05 PROCEDURE — 85025 COMPLETE CBC W/AUTO DIFF WBC: CPT

## 2022-06-05 PROCEDURE — 96375 TX/PRO/DX INJ NEW DRUG ADDON: CPT

## 2022-06-05 PROCEDURE — 81025 URINE PREGNANCY TEST: CPT

## 2022-06-05 PROCEDURE — 2580000003 HC RX 258: Performed by: STUDENT IN AN ORGANIZED HEALTH CARE EDUCATION/TRAINING PROGRAM

## 2022-06-05 RX ORDER — 0.9 % SODIUM CHLORIDE 0.9 %
1000 INTRAVENOUS SOLUTION INTRAVENOUS
Status: COMPLETED | OUTPATIENT
Start: 2022-06-05 | End: 2022-06-06

## 2022-06-05 RX ORDER — MORPHINE SULFATE 2 MG/ML
2 INJECTION, SOLUTION INTRAMUSCULAR; INTRAVENOUS ONCE
Status: COMPLETED | OUTPATIENT
Start: 2022-06-06 | End: 2022-06-06

## 2022-06-05 RX ORDER — SODIUM CHLORIDE 0.9 % (FLUSH) 0.9 %
3 SYRINGE (ML) INJECTION EVERY 8 HOURS
Status: DISCONTINUED | OUTPATIENT
Start: 2022-06-05 | End: 2022-06-06 | Stop reason: HOSPADM

## 2022-06-05 RX ORDER — MORPHINE SULFATE 2 MG/ML
2 INJECTION, SOLUTION INTRAMUSCULAR; INTRAVENOUS ONCE
Status: COMPLETED | OUTPATIENT
Start: 2022-06-05 | End: 2022-06-05

## 2022-06-05 RX ORDER — ONDANSETRON 2 MG/ML
4 INJECTION INTRAMUSCULAR; INTRAVENOUS
Status: COMPLETED | OUTPATIENT
Start: 2022-06-05 | End: 2022-06-05

## 2022-06-05 RX ADMIN — HYDROCORTISONE SODIUM SUCCINATE 50 MG: 100 INJECTION, POWDER, FOR SOLUTION INTRAMUSCULAR; INTRAVENOUS at 22:55

## 2022-06-05 RX ADMIN — SODIUM CHLORIDE 1000 ML: 9 INJECTION, SOLUTION INTRAVENOUS at 22:01

## 2022-06-05 RX ADMIN — MORPHINE SULFATE 2 MG: 2 INJECTION, SOLUTION INTRAMUSCULAR; INTRAVENOUS at 22:55

## 2022-06-05 RX ADMIN — ONDANSETRON 4 MG: 2 INJECTION INTRAMUSCULAR; INTRAVENOUS at 22:55

## 2022-06-05 ASSESSMENT — PAIN DESCRIPTION - LOCATION: LOCATION: GENERALIZED

## 2022-06-05 ASSESSMENT — ENCOUNTER SYMPTOMS
ABDOMINAL PAIN: 1
PHOTOPHOBIA: 0
SHORTNESS OF BREATH: 0
SORE THROAT: 0
VOMITING: 1
COUGH: 0
DIARRHEA: 1
NAUSEA: 1

## 2022-06-05 ASSESSMENT — PAIN DESCRIPTION - DESCRIPTORS: DESCRIPTORS: SHARP;SORE

## 2022-06-05 ASSESSMENT — PAIN SCALES - GENERAL: PAINLEVEL_OUTOF10: 8

## 2022-06-05 ASSESSMENT — PAIN - FUNCTIONAL ASSESSMENT: PAIN_FUNCTIONAL_ASSESSMENT: 0-10

## 2022-06-05 NOTE — ED TRIAGE NOTES
Pt presents to the ED today with multiple c/o's. States that she had a leftt shoulder repair in May and has not felt welll since then. States that she has had abd pain. with nausea and vomiting  2 days. Unable to keep her pain meds. States that she thinks her pancreatitis may be flared up.   Masked on arrival

## 2022-06-06 VITALS
SYSTOLIC BLOOD PRESSURE: 106 MMHG | OXYGEN SATURATION: 100 % | BODY MASS INDEX: 29.02 KG/M2 | HEART RATE: 75 BPM | TEMPERATURE: 98.9 F | DIASTOLIC BLOOD PRESSURE: 70 MMHG | RESPIRATION RATE: 21 BRPM | WEIGHT: 170 LBS | HEIGHT: 64 IN

## 2022-06-06 PROCEDURE — 6360000002 HC RX W HCPCS: Performed by: STUDENT IN AN ORGANIZED HEALTH CARE EDUCATION/TRAINING PROGRAM

## 2022-06-06 PROCEDURE — 2580000003 HC RX 258: Performed by: STUDENT IN AN ORGANIZED HEALTH CARE EDUCATION/TRAINING PROGRAM

## 2022-06-06 RX ORDER — 0.9 % SODIUM CHLORIDE 0.9 %
1000 INTRAVENOUS SOLUTION INTRAVENOUS
Status: COMPLETED | OUTPATIENT
Start: 2022-06-06 | End: 2022-06-06

## 2022-06-06 RX ADMIN — SODIUM CHLORIDE 1000 ML: 900 INJECTION, SOLUTION INTRAVENOUS at 01:03

## 2022-06-06 RX ADMIN — MORPHINE SULFATE 2 MG: 2 INJECTION, SOLUTION INTRAMUSCULAR; INTRAVENOUS at 00:13

## 2022-06-06 ASSESSMENT — PAIN SCALES - GENERAL: PAINLEVEL_OUTOF10: 8

## 2022-06-06 NOTE — ED NOTES
I have reviewed discharge instructions with the patient. The patient verbalized understanding. Patient left ED via Discharge Method: ambulatory to Home with mother. Opportunity for questions and clarification provided. Patient given 0 scripts. To continue your aftercare when you leave the hospital, you may receive an automated call from our care team to check in on how you are doing. This is a free service and part of our promise to provide the best care and service to meet your aftercare needs.  If you have questions, or wish to unsubscribe from this service please call [phone]. Thank you for Choosing our [facility] Emergency Department.           Zbigniew Hubbard RN  06/06/22 7294

## 2022-06-06 NOTE — ED PROVIDER NOTES
[facility] Emergency Department Provider Note                   PCP:                Carmen Borrego MD               Age: 42 y.o. Sex: female       ICD-10-CM    1. Non-intractable vomiting with nausea, unspecified vomiting type  R11.2    2. Generalized abdominal pain  R10.84        DISPOSITION Decision To Discharge 06/06/2022 02:24:44 AM       New Prescriptions    No medications on file       Orders Placed This Encounter   Procedures    CBC with Diff    CMP    Lipase    Pregnancy, Urine    Diet NPO    POCT Urine Dipstick    POCT Urine Dipstick    POC Pregnancy Urine Qual    EKG 12 Lead (Select if Upper Abd Pain, or SOB, Diaphoresis or Tachy)    Saline lock IV        MDM  Number of Diagnoses or Management Options  Generalized abdominal pain  Non-intractable vomiting with nausea, unspecified vomiting type  Diagnosis management comments: 54-year-old female history of chronic pain as well as Eugene's disease presents to the emergency department with nausea and vomiting and continuum of her chronic abdominal as well as shoulder pain. Exam reveals no emergent findings. Does have dry mucosal membranes, will give limited bolus IV fluid, will give stress dose hydrocortisone secondary to patient's concern for not keeping down her home steroids. Also will give 2 mg IV morphine as well as Zofran for nausea. Patient has significant list of allergies to medications. Patient states she is not allergic to the Dilaudid or Zofran. Lab work shows white count 12.2 which is likely secondary to patient's steroid usage, is lower than her multiple prior white counts. Stable H&H, normal electrolytes, creatinine 1.4, slightly elevated from  2 weeks ago it was 1.09, will give 1 L bolus IV fluid, normal LFTs, normal lipase. Patient did have a transient drop in blood pressure, improved after second liter of IV fluid. Patient reports she feels much improved and states she is ready to be discharged home.   Advised to return to ER for worsening or worrisome symptoms. Patient and family voiced understanding and agreement with this plan. Amount and/or Complexity of Data Reviewed  Clinical lab tests: reviewed and ordered  Decide to obtain previous medical records or to obtain history from someone other than the patient: yes  Review and summarize past medical records: yes    Risk of Complications, Morbidity, and/or Mortality  Presenting problems: moderate  Diagnostic procedures: moderate  Management options: jose eduardo Aggarwal is a 42 y.o. female who presents to the Emergency Department with chief complaint of    Chief Complaint   Patient presents with    Abdominal Pain    Nausea      79-year-old female presents to the emerged part with chronic nausea, vomiting as well as chronic generalized pain. She reports pain in her left shoulder after recent shoulder surgery was performed approximately 1 month ago. States he has not had to keep down her home medications secondary to her pain being so severe that causes her to have nausea and vomiting. States her home Zofran has not been working. Does have history of [place]'s disease and has not been keeping down her steroids. She is concerned that her blood pressure of 736 systolic is too low for her and believes is secondary to her lack of steroid intake. She reports pain in her left shoulder since the surgery but denies fever or chills. Reports pain with range of motion of her left little finger although no trauma. Normal sensation of the left hand. States she also has epigastric abdominal discomfort and feels that her pancreatitis is flaring up. Also reports pain in her left great toe which has been present intermittently for a few weeks without any trauma. Denies any swelling or bruising. All other systems reviewed and are negative. Review of Systems   Constitutional: Negative for chills and fever. HENT: Negative for sore throat.     Eyes: Negative for photophobia. Respiratory: Negative for cough and shortness of breath. Cardiovascular: Negative for chest pain. Gastrointestinal: Positive for abdominal pain, diarrhea, nausea and vomiting. Genitourinary: Negative for dysuria. Musculoskeletal: Positive for arthralgias. Negative for neck pain and neck stiffness. Skin: Negative for rash. Neurological: Negative for syncope and headaches. Psychiatric/Behavioral: Negative for confusion. All other systems reviewed and are negative.       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     Nausea & vomiting     Other ill-defined conditions(799.89)     allergies    Unspecified adverse effect of anesthesia     pt reports \"difficult to sedate        Past Surgical History:   Procedure Laterality Date    APPENDECTOMY      CHOLECYSTECTOMY      GYN      hysterectomy    [place]    right [place]    [place]OPED[place] SURG[place]    Left  [place]    OTHER SURGICAL HISTORY  2007    I&D of 9 abcesses -MRSA    SHOULDER ARTHROPLASTY  2015    left    SHOULDER ARTHROPLASTY  12/2011    Right    TONSILLECTOMY          Family History   Problem Relation Age of Onset    Stroke Maternal Grandfather     Diabetes Maternal Grandmother     Heart Disease Paternal Grandmother     Cancer Mother         THROID CANCER           Social Connections:     Frequency of Communication with Friends and Family: Not on file    Frequency of Social Gatherings with Friends and abdominal discomfort with distraction   Musculoskeletal:         General: No swelling or tenderness. Normal range of motion. Cervical back: Normal range of motion. No rigidity. Comments: Left upper extremity: Sling from orthopedics in place. Full range of motion of the wrist and hand and fingers without difficulty. She reports pain to the fifth digit with range of motion, no erythema, ecchymosis or edema present, neurovascular intact    Left great toe: Swelling over the medial aspect of the first MTP consistent with a bunion, full range of motion of the toe without any apparent physical exam abnormalities. Good cap refill no edema, erythema or ecchymosis. Reported pain with range of motion of the left great toe. Skin:     General: Skin is warm. Findings: No rash. Neurological:      General: No focal deficit present. Mental Status: She is alert and oriented to person, place, and time.    Psychiatric:         Mood and Affect: Mood normal.          Procedures    Labs Reviewed   CBC WITH AUTO DIFFERENTIAL - Abnormal; Notable for the following components:       Result Value    WBC 12.2 (*)     RBC 3.94 (*)     Hemoglobin 10.2 (*)     Hematocrit 34.3 (*)     MCH 25.9 (*)     MCHC 29.7 (*)     MPV 9.3 (*)     Eosinophils % 0 (*)     Segs Absolute 9.4 (*)     All other components within normal limits   COMPREHENSIVE METABOLIC PANEL - Abnormal; Notable for the following components:    Glucose 101 (*)     CREATININE 1.44 (*)     GFR  51 (*)     GFR Non- 42 (*)     AST 11 (*)     Albumin 2.8 (*)     Globulin 4.1 (*)     Albumin/Globulin Ratio 0.7 (*)     All other components within normal limits   LIPASE - Abnormal; Notable for the following components:    Lipase 50 (*)     All other components within normal limits   PREGNANCY, URINE   POC PREGNANCY UR-QUAL        No orders to display                            Voice dictation software was used during the making of this note. This software is not perfect and grammatical and other typographical errors may be present. This note has not been completely proofread for errors.         Neftaly Curry DO  06/06/22 0225

## 2022-06-13 ENCOUNTER — HOSPITAL ENCOUNTER (EMERGENCY)
Dept: GENERAL RADIOLOGY | Age: 42
Discharge: HOME | End: 2022-06-16
Payer: COMMERCIAL

## 2022-06-13 ENCOUNTER — HOSPITAL ENCOUNTER (EMERGENCY)
Age: 42
Discharge: HOME | End: 2022-06-14
Attending: EMERGENCY MEDICINE
Payer: COMMERCIAL

## 2022-06-13 VITALS
HEART RATE: 84 BPM | OXYGEN SATURATION: 97 % | SYSTOLIC BLOOD PRESSURE: 133 MMHG | WEIGHT: 170 LBS | TEMPERATURE: 98.8 F | HEIGHT: 64 IN | RESPIRATION RATE: 16 BRPM | DIASTOLIC BLOOD PRESSURE: 92 MMHG | BODY MASS INDEX: 29.02 KG/M2

## 2022-06-13 DIAGNOSIS — R11.2 NAUSEA AND VOMITING, INTRACTABILITY OF VOMITING NOT SPECIFIED, UNSPECIFIED VOMITING TYPE: Primary | ICD-10-CM

## 2022-06-13 LAB
ALBUMIN SERPL-MCNC: 3 G/DL (ref 3.5–5)
ALBUMIN/GLOB SERPL: 0.9 {RATIO} (ref 1.2–3.5)
ALP SERPL-CCNC: 56 U/L (ref 50–130)
ALT SERPL-CCNC: 15 U/L (ref 12–65)
ANION GAP SERPL CALC-SCNC: 8 MMOL/L (ref 7–16)
AST SERPL-CCNC: 8 U/L (ref 15–37)
BILIRUB SERPL-MCNC: 0.2 MG/DL (ref 0.2–1.1)
BUN SERPL-MCNC: 25 MG/DL (ref 6–23)
CALCIUM SERPL-MCNC: 9.3 MG/DL (ref 8.3–10.4)
CHLORIDE SERPL-SCNC: 100 MMOL/L (ref 98–107)
CO2 SERPL-SCNC: 32 MMOL/L (ref 21–32)
CREAT SERPL-MCNC: 1.38 MG/DL (ref 0.6–1)
ERYTHROCYTE [DISTWIDTH] IN BLOOD BY AUTOMATED COUNT: 14.2 % (ref 11.9–14.6)
GLOBULIN SER CALC-MCNC: 3.4 G/DL (ref 2.3–3.5)
GLUCOSE SERPL-MCNC: 114 MG/DL (ref 65–100)
HCT VFR BLD AUTO: 33.5 % (ref 35.8–46.3)
HGB BLD-MCNC: 10.1 G/DL (ref 11.7–15.4)
MAGNESIUM SERPL-MCNC: 2.1 MG/DL (ref 1.8–2.4)
MCH RBC QN AUTO: 25.6 PG (ref 26.1–32.9)
MCHC RBC AUTO-ENTMCNC: 30.1 G/DL (ref 31.4–35)
MCV RBC AUTO: 85 FL (ref 79.6–97.8)
NRBC # BLD: 0 K/UL (ref 0–0.2)
PLATELET # BLD AUTO: 430 K/UL (ref 150–450)
PMV BLD AUTO: 9.3 FL (ref 9.4–12.3)
POTASSIUM SERPL-SCNC: 3.7 MMOL/L (ref 3.5–5.1)
PROT SERPL-MCNC: 6.4 G/DL (ref 6.3–8.2)
RBC # BLD AUTO: 3.94 M/UL (ref 4.05–5.2)
SODIUM SERPL-SCNC: 140 MMOL/L (ref 136–145)
TROPONIN I SERPL HS-MCNC: 5.5 PG/ML (ref 0–14)
WBC # BLD AUTO: 9.1 K/UL (ref 4.3–11.1)

## 2022-06-13 PROCEDURE — 83735 ASSAY OF MAGNESIUM: CPT

## 2022-06-13 PROCEDURE — 93005 ELECTROCARDIOGRAM TRACING: CPT | Performed by: EMERGENCY MEDICINE

## 2022-06-13 PROCEDURE — 80053 COMPREHEN METABOLIC PANEL: CPT

## 2022-06-13 PROCEDURE — 84484 ASSAY OF TROPONIN QUANT: CPT

## 2022-06-13 PROCEDURE — 6370000000 HC RX 637 (ALT 250 FOR IP): Performed by: EMERGENCY MEDICINE

## 2022-06-13 PROCEDURE — 99285 EMERGENCY DEPT VISIT HI MDM: CPT

## 2022-06-13 PROCEDURE — 85027 COMPLETE CBC AUTOMATED: CPT

## 2022-06-13 PROCEDURE — 71046 X-RAY EXAM CHEST 2 VIEWS: CPT

## 2022-06-13 RX ORDER — HYDROCORTISONE 5 MG/1
2.5 TABLET ORAL DAILY
Status: DISCONTINUED | OUTPATIENT
Start: 2022-06-14 | End: 2022-06-14 | Stop reason: HOSPADM

## 2022-06-13 RX ORDER — HYDROCODONE BITARTRATE AND ACETAMINOPHEN 5; 325 MG/1; MG/1
1 TABLET ORAL
Status: COMPLETED | OUTPATIENT
Start: 2022-06-13 | End: 2022-06-14

## 2022-06-13 RX ORDER — SODIUM CHLORIDE 0.9 % (FLUSH) 0.9 %
3 SYRINGE (ML) INJECTION EVERY 8 HOURS
Status: DISCONTINUED | OUTPATIENT
Start: 2022-06-13 | End: 2022-06-14 | Stop reason: HOSPADM

## 2022-06-13 RX ORDER — ONDANSETRON 4 MG/1
4 TABLET, ORALLY DISINTEGRATING ORAL
Status: COMPLETED | OUTPATIENT
Start: 2022-06-13 | End: 2022-06-14

## 2022-06-13 ASSESSMENT — PAIN - FUNCTIONAL ASSESSMENT: PAIN_FUNCTIONAL_ASSESSMENT: 0-10

## 2022-06-13 ASSESSMENT — PAIN DESCRIPTION - FREQUENCY: FREQUENCY: CONTINUOUS

## 2022-06-13 ASSESSMENT — PAIN DESCRIPTION - LOCATION: LOCATION: CHEST

## 2022-06-13 ASSESSMENT — ENCOUNTER SYMPTOMS
COLOR CHANGE: 0
WHEEZING: 0
COUGH: 0
EYE REDNESS: 0
NAUSEA: 0
DIARRHEA: 0
VOMITING: 0
SHORTNESS OF BREATH: 0
ABDOMINAL PAIN: 0
SORE THROAT: 0

## 2022-06-13 ASSESSMENT — PAIN SCALES - GENERAL: PAINLEVEL_OUTOF10: 10

## 2022-06-14 LAB
EKG ATRIAL RATE: 75 BPM
EKG DIAGNOSIS: NORMAL
EKG P AXIS: 33 DEGREES
EKG P-R INTERVAL: 132 MS
EKG Q-T INTERVAL: 384 MS
EKG QRS DURATION: 66 MS
EKG QTC CALCULATION (BAZETT): 428 MS
EKG R AXIS: 15 DEGREES
EKG T AXIS: 27 DEGREES
EKG VENTRICULAR RATE: 75 BPM

## 2022-06-14 PROCEDURE — 6360000002 HC RX W HCPCS: Performed by: EMERGENCY MEDICINE

## 2022-06-14 PROCEDURE — 6370000000 HC RX 637 (ALT 250 FOR IP): Performed by: EMERGENCY MEDICINE

## 2022-06-14 PROCEDURE — 96374 THER/PROPH/DIAG INJ IV PUSH: CPT

## 2022-06-14 RX ORDER — ONDANSETRON 2 MG/ML
4 INJECTION INTRAMUSCULAR; INTRAVENOUS ONCE
Status: COMPLETED | OUTPATIENT
Start: 2022-06-14 | End: 2022-06-14

## 2022-06-14 RX ADMIN — ONDANSETRON 4 MG: 2 INJECTION INTRAMUSCULAR; INTRAVENOUS at 00:30

## 2022-06-14 RX ADMIN — HYDROCODONE BITARTRATE AND ACETAMINOPHEN 1 TABLET: 5; 325 TABLET ORAL at 00:17

## 2022-06-14 RX ADMIN — ONDANSETRON 4 MG: 4 TABLET, ORALLY DISINTEGRATING ORAL at 00:17

## 2022-06-14 NOTE — ED NOTES
Mother came to the desk and stated that the pt wanted leave.   Refused to sign her AMA form     Stef Chicas RN  06/14/22 0134

## 2022-06-14 NOTE — ED NOTES
Pt refused Norco and Zofran.   States that she is not being treated appropriately for her symptoms     Maximo Rivero RN  06/14/22 1917

## 2022-06-14 NOTE — ED NOTES
Pt began vomiting. Now, requesting pain and nausea meds.  Dr Lyly Moncada made aware and orders written     Jelena Blankenship RN  06/14/22 2941

## 2022-06-14 NOTE — ED PROVIDER NOTES
[facility] Emergency Department Provider Note                   PCP:                Dirk Hendrickson MD               Age: 42 y.o. Sex: female     No diagnosis found. DISPOSITION         New Prescriptions    No medications on file       Orders Placed This Encounter   Procedures    XR CHEST (2 VW)    Troponin    CBC    Comprehensive Metabolic Panel    Cardiac Monitor    Pulse Oximetry    EKG 12 Lead    Saline lock IV        Jeremy Moses MD 10:50 PM      MDM  Number of Diagnoses or Management Options  Nausea and vomiting, intractability of vomiting not specified, unspecified vomiting type  Diagnosis management comments: I will give her some oral Zofran and Norco and try to give her her usual nighttime dose of Cortef. We will check her basic blood work and cardiac enzymes. Nic Michele is a 42 y.o. female who presents to the Emergency Department with chief complaint of    Chief Complaint   Patient presents with    Chest Pain      70-year-old lady with a history of [place]'s disease presents with concerns about feeling a little shaky and some chest pain. She says that about a month ago she had some shoulder surgery on her left shoulder and said she was doing fine until a couple of days ago when she started to have some pain in the left side of her chest.  She denies any shortness of breath or difficulty breathing. She says she also has some swelling in her lower legs. She notes that she has had some nausea and in the past couple of days she has not been able to keep her usual daily Cortef down. She says when that happens she does sometimes get a little shaky and will get the swelling. No fevers or chills and no cough or shortness of breath. No other associated symptoms. Elements of this note were created using speech recognition software. As such, errors of speech recognition may be present.             Review of Systems   Constitutional: Negative for activity change, chills and fever. Feeling a little shaky   HENT: Negative for congestion and sore throat. Eyes: Negative for redness and visual disturbance. Respiratory: Negative for cough, shortness of breath and wheezing. Cardiovascular: Positive for chest pain. Negative for palpitations. Gastrointestinal: Negative for abdominal pain, diarrhea, nausea and vomiting. Endocrine: Negative for polydipsia and polyuria. Genitourinary: Negative for flank pain and hematuria. Musculoskeletal: Positive for joint swelling. Negative for myalgias. Skin: Negative for color change and rash. Allergic/Immunologic: Negative for immunocompromised state. Neurological: Negative for dizziness, light-headedness and headaches. Hematological: Negative for adenopathy. Psychiatric/Behavioral: Negative for confusion.        Past Medical History:   Diagnosis Date    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    Arthritis     Aseptic necrosis of head of humerus ([facility] [address] 12/8/2011    Asthma     uses MDI as needed - rarely-pt can't remember last time needed    Chronic pain     Endocrine disease diagnosed 2003    ADDISONS DISEASE    Endometriosis     hysterectomy    GERD (gastroesophageal reflux disease)     Infectious disease 2007    MRSA-2007, community acquired - in hospital [facility]) about 6 months - 3 months of which was in coma;  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     Nausea & vomiting     Other ill-defined conditions(939.89)     allergies    Unspecified adverse effect of anesthesia     pt reports \"difficult to sedate        Past Surgical History:   Procedure Laterality Date    APPENDECTOMY      CHOLECYSTECTOMY      GYN      hysterectomy    ORTHOPEDIC SURGERY  [place]    right [place]    ORTHOPEDIC SURGERY  [place]    Left  [place]    OTHER SURGICAL HISTORY  2007    I&D of 9 abcesses -MRSA    SHOULDER ARTHROPLASTY  2015    left    SHOULDER ARTHROPLASTY  12/2011    Right    TONSILLECTOMY          Family History   Problem Relation Age of Onset  Stroke Maternal Grandfather     Diabetes Maternal Grandmother     Heart Disease Paternal Grandmother     Cancer Mother         THROID CANCER           Social Connections:     Frequency of Communication with Friends and Family: Not on file    Frequency of Social Gatherings with Friends and Family: Not on file    Attends Caodaism Services: Not on file    Active Member of Clubs or Organizations: Not on file    Attends Club or Organization Meetings: Not on file    Marital Status: Not on file        Allergies   Allergen Reactions    Latex Anaphylaxis    [place] Oil Anaphylaxis    [place] Nut (Gema [place]ar) Skin Test Anaphylaxis    Metoclopramide Anaphylaxis     dystonia    Chlorpromazine Other (See Comments)     Dystonic    Gluten Meal Other (See Comments)     Per allergy testing    Hydroxyzine Pamoate Other (See Comments)     DYSTONIC    Prochlorperazine Edisylate Other (See Comments)     Dystonic    Promethazine Other (See Comments)     Dystonic    Azithromycin Rash    Clindamycin Rash    Ibuprofen Photosensitivity and Rash    Iodine Rash    Ketorolac Tromethamine Rash    Nalbuphine Rash    Naproxen Sodium Rash    Penicillins Rash    Sulfa Antibiotics Rash    Tramadol Rash        Vitals signs and nursing note reviewed. Patient Vitals for the past 4 hrs:   Temp Pulse Resp BP SpO2   06/13/22 2216 -- -- -- (!) 133/92 --   06/13/22 2214 98.8 °F (37.1 °C) 84 16 -- 97 %          Physical Exam  Vitals and nursing note reviewed. Constitutional:       Appearance: Normal appearance. HENT:      Head: Normocephalic and atraumatic. Mouth/Throat:      Mouth: Mucous membranes are moist.   Eyes:      General:         Right eye: No discharge. Left eye: No discharge. Cardiovascular:      Rate and Rhythm: Normal rate and regular rhythm. Pulses: Normal pulses. Pulmonary:      Effort: Pulmonary effort is normal.      Breath sounds: Normal breath sounds. No wheezing. Abdominal:      General: Bowel sounds are normal.      Tenderness: There is no abdominal tenderness. There is no guarding or rebound. Musculoskeletal:      Right lower leg: No edema. Left lower leg: No edema. Comments: Trace to 1+ edema in both lower extremities   Lymphadenopathy:      Cervical: No cervical adenopathy. Skin:     Coloration: Skin is not jaundiced. Neurological:      General: No focal deficit present. Mental Status: She is alert and oriented to person, place, and time. Mental status is at baseline. Procedures        Labs Reviewed   TROPONIN   TROPONIN   CBC   COMPREHENSIVE METABOLIC PANEL        XR CHEST (2 VW)    (Results Pending)                      ED Course as of 06/14/22 0127   Mon Jun 13, 2022   2352 Patient's blood work is normal.  She became upset when I did not offer her IV pain medicine. She said that she takes OxyContin and morphine at home and IV pain medicine is the only thing that helps her pain. I advised her that I do not think IV pain medicine is indicated at this time and that we would try the oral medicine. [AC]   Tue Jun 14, 2022   0023 Patient had not vomited for the first 2 hours and 15 minutes of her visit here. Just after the nurse gave her at the p.o. medicine she then vomited back up into a bag. I will offer some IV nausea medicine. [AC]   0126 Patient did not wish to stay to see if she responded to the IV nausea medicine. She apparently signed out AMA. [AC]      ED Course User Index  [AC] Verner Stabile, MD        Voice dictation software was used during the making of this note. This software is not perfect and grammatical and other typographical errors may be present. This note has not been completely proofread for errors.         Verner Stabile, MD  06/14/22 Shashi Hargrove MD  06/14/22 0787

## 2022-06-14 NOTE — ED TRIAGE NOTES
Pt arrives in wheelchair A&Ox4 to triage. Pt states she has been having chest pain since yesterday with N/V. Pt states she feels her feet are also swollen. Pt had total shoulder replacement a month ago and told by PA for that surgeon to be evaluated in ED for chest pain.

## 2022-07-24 ENCOUNTER — HOSPITAL ENCOUNTER (EMERGENCY)
Dept: GENERAL RADIOLOGY | Age: 42
Discharge: HOME | End: 2022-07-27
Payer: MEDICAID

## 2022-07-24 ENCOUNTER — HOSPITAL ENCOUNTER (EMERGENCY)
Age: 42
Discharge: HOME | End: 2022-07-24
Payer: MEDICAID

## 2022-07-24 VITALS
TEMPERATURE: 98.2 F | BODY MASS INDEX: 28.17 KG/M2 | HEART RATE: 105 BPM | OXYGEN SATURATION: 96 % | HEIGHT: 64 IN | SYSTOLIC BLOOD PRESSURE: 118 MMHG | RESPIRATION RATE: 16 BRPM | WEIGHT: 165 LBS | DIASTOLIC BLOOD PRESSURE: 85 MMHG

## 2022-07-24 DIAGNOSIS — Z76.5 DRUG-SEEKING BEHAVIOR: ICD-10-CM

## 2022-07-24 DIAGNOSIS — S40.012A CONTUSION OF LEFT SHOULDER, INITIAL ENCOUNTER: Primary | ICD-10-CM

## 2022-07-24 PROCEDURE — 73030 X-RAY EXAM OF SHOULDER: CPT

## 2022-07-24 PROCEDURE — 99283 EMERGENCY DEPT VISIT LOW MDM: CPT

## 2022-07-24 RX ORDER — ACETAMINOPHEN 325 MG/1
650 TABLET ORAL EVERY 4 HOURS PRN
Status: DISCONTINUED | OUTPATIENT
Start: 2022-07-24 | End: 2022-07-24 | Stop reason: HOSPADM

## 2022-07-24 ASSESSMENT — PAIN DESCRIPTION - PAIN TYPE: TYPE: ACUTE PAIN

## 2022-07-24 ASSESSMENT — PAIN - FUNCTIONAL ASSESSMENT
PAIN_FUNCTIONAL_ASSESSMENT: PREVENTS OR INTERFERES SOME ACTIVE ACTIVITIES AND ADLS
PAIN_FUNCTIONAL_ASSESSMENT: 0-10

## 2022-07-24 ASSESSMENT — PAIN SCALES - GENERAL: PAINLEVEL_OUTOF10: 10

## 2022-07-24 ASSESSMENT — ENCOUNTER SYMPTOMS
GASTROINTESTINAL NEGATIVE: 1
SHORTNESS OF BREATH: 0
RESPIRATORY NEGATIVE: 1
EYES NEGATIVE: 1

## 2022-07-24 ASSESSMENT — PAIN DESCRIPTION - LOCATION: LOCATION: ARM;SHOULDER

## 2022-07-24 ASSESSMENT — PAIN DESCRIPTION - ONSET: ONSET: SUDDEN

## 2022-07-24 ASSESSMENT — PAIN DESCRIPTION - FREQUENCY: FREQUENCY: CONTINUOUS

## 2022-07-24 ASSESSMENT — PAIN DESCRIPTION - ORIENTATION: ORIENTATION: LEFT

## 2022-07-24 NOTE — ED PROVIDER NOTES
female who presents to the Emergency Department with chief complaint of    Chief Complaint   Patient presents with    Shoulder Pain      50-year-old female complaint left shoulder pain states a car door hit her in the shoulder. She had surgery on her shoulder in May. She is concerned this may have fractured around the prosthetic device. The history is provided by the patient. Shoulder Pain  This is a recurrent problem. The current episode started 6 to 12 hours ago. The problem occurs constantly. The problem has not changed since onset. Pertinent negatives include no chest pain and no shortness of breath. Review of Systems   Constitutional: Negative. Negative for activity change, appetite change, chills, fatigue and fever. HENT: Negative. Eyes: Negative. Respiratory: Negative. Negative for shortness of breath. Cardiovascular: Negative. Negative for chest pain. Gastrointestinal: Negative. Endocrine: Negative. Musculoskeletal: Negative. Neurological: Negative. Hematological: Negative. Psychiatric/Behavioral: Negative. All other systems reviewed and are negative. All other systems reviewed and are negative.       Past Medical History:   Diagnosis Date    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    Arthritis     Aseptic necrosis of head of humerus ([facility] [address] 12/8/2011    Asthma     uses MDI as needed - rarely-pt can't remember last time needed    Chronic pain     Endocrine disease diagnosed 2003    ADDISONS DISEASE    Endometriosis     hysterectomy    GERD (gastroesophageal reflux disease)     Infectious disease 2007    MRSA-2007, community acquired - in hospital ([facility]) about 6 months - 3 months of which was in coma;  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     Nausea & vomiting     Other ill-defined conditions(429.89)     allergies    Unspecified adverse effect of anesthesia     pt reports \"difficult to sedate        Past Surgical History:   Procedure Laterality Date    APPENDECTOMY CHOLECYSTECTOMY      GYN      hysterectomy    ORTHOPEDIC SURGERY  [place]    right [place]    ORTHOPEDIC SURGERY  [place]    Left  [place]    OTHER SURGICAL HISTORY  2007    I&D of 9 abcesses -MRSA    SHOULDER ARTHROPLASTY  2015    left    SHOULDER ARTHROPLASTY  12/2011    Right    TONSILLECTOMY          Family History   Problem Relation Age of Onset    Stroke Maternal Grandfather     Diabetes Maternal Grandmother     Heart Disease Paternal Grandmother     Cancer Mother         THROID CANCER        Social Connections: Not on file        Allergies   Allergen Reactions    Latex Anaphylaxis    [place] Oil Anaphylaxis    [place] Nut ([place]) Skin Test Anaphylaxis    Ketamine Hives     Hallucinations/rash    Metoclopramide Anaphylaxis     dystonia    Chlorpromazine Other (See Comments)     Dystonic    Gluten Meal Other (See Comments)     Per allergy testing    Hydroxyzine Pamoate Other (See Comments)     DYSTONIC    Prochlorperazine Edisylate Other (See Comments)     Dystonic    Promethazine Other (See Comments)     Dystonic    Azithromycin Rash    Clindamycin Rash    Ibuprofen Photosensitivity and Rash    Iodine Rash    Ketorolac Tromethamine Rash    Nalbuphine Rash    Naproxen Sodium Rash    Penicillins Rash    Sulfa Antibiotics Rash    Tramadol Rash        Vitals signs and nursing note reviewed. Patient Vitals for the past 4 hrs:   Temp Pulse Resp BP SpO2   07/24/22 0102 98.2 °F (36.8 °C) (!) 105 16 118/85 96 %          Physical Exam  Vitals and nursing note reviewed. Constitutional:       Appearance: Normal appearance. She is not ill-appearing. HENT:      Head: Normocephalic and atraumatic. Right Ear: External ear normal.      Left Ear: External ear normal.      Nose: Nose normal.      Mouth/Throat:      Mouth: Mucous membranes are moist.   Eyes:      Extraocular Movements: Extraocular movements intact. Conjunctiva/sclera: Conjunctivae normal.      Pupils: Pupils are equal, round, and reactive to light. Cardiovascular:      Rate and Rhythm: Normal rate and regular rhythm. Pulses: Normal pulses. Heart sounds: Normal heart sounds. Pulmonary:      Effort: Pulmonary effort is normal. No respiratory distress. Breath sounds: Normal breath sounds. Abdominal:      General: Bowel sounds are normal. There is no distension. Palpations: Abdomen is soft. Musculoskeletal:         General: Tenderness present. No swelling or signs of injury. Left shoulder: Tenderness present. No swelling, deformity or laceration. Decreased range of motion. Cervical back: Normal range of motion. No rigidity. Skin:     General: Skin is warm and dry. Capillary Refill: Capillary refill takes less than 2 seconds. Neurological:      General: No focal deficit present. Mental Status: She is alert and oriented to person, place, and time. Mental status is at baseline. Psychiatric:         Mood and Affect: Mood normal.         Behavior: Behavior normal.         Thought Content: Thought content normal.         Judgment: Judgment normal.        Procedures    Labs Reviewed - No data to display     XR SHOULDER LEFT (MIN 2 VIEWS)    (Results Pending)              [place] Coma Scale  Eye Opening: Spontaneous  Best Verbal Response: Oriented  Best Motor Response: Obeys commands  [place] Coma Scale Score: 15                     CIWA Assessment  BP: 118/85  Heart Rate: (!) 105                       Voice dictation software was used during the making of this note. This software is not perfect and grammatical and other typographical errors may be present. This note has not been completely proofread for errors.         Gabriel Bess MD  07/24/22 1575       Gabriel Bess MD  07/24/22 7124

## 2022-07-24 NOTE — ED NOTES
I have reviewed discharge instructions with the patient. The patient verbalized understanding. Patient left ED via Discharge Method: ambulatory to Home with mother. Opportunity for questions and clarification provided. Patient given 0 scripts. To continue your aftercare when you leave the hospital, you may receive an automated call from our care team to check in on how you are doing. This is a free service and part of our promise to provide the best care and service to meet your aftercare needs.  If you have questions, or wish to unsubscribe from this service please call [phone]. Thank you for Choosing our [facility] Emergency Department.         oRla Nuno RN  07/24/22 0716

## 2022-07-24 NOTE — ED TRIAGE NOTES
Pt states she recently had surgery on the left arm and shoulder and accidentally hit her left arm on the car door today really hard and now is experiencing severe pain. 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

## 2023-01-31 RX ORDER — PREDNISONE 20 MG/1
TABLET ORAL PRN
COMMUNITY

## 2023-01-31 RX ORDER — MAGNESIUM OXIDE 400 MG/1
TABLET ORAL DAILY
COMMUNITY

## 2023-01-31 RX ORDER — METHYLNALTREXONE BROMIDE 150 MG/1
TABLET ORAL 2 TIMES DAILY
COMMUNITY
Start: 2022-09-07

## 2023-01-31 RX ORDER — ITRACONAZOLE 10 MG/ML
SOLUTION ORAL DAILY
COMMUNITY

## 2023-01-31 RX ORDER — KETOCONAZOLE 20 MG/ML
SHAMPOO TOPICAL DAILY
COMMUNITY

## 2023-01-31 RX ORDER — AZELASTINE HYDROCHLORIDE, FLUTICASONE PROPIONATE 137; 50 UG/1; UG/1
SPRAY, METERED NASAL DAILY
COMMUNITY

## 2023-01-31 RX ORDER — TIOTROPIUM BROMIDE INHALATION SPRAY 1.56 UG/1
SPRAY, METERED RESPIRATORY (INHALATION) DAILY
COMMUNITY

## 2023-01-31 RX ORDER — BUMETANIDE 2 MG/1
TABLET ORAL 2 TIMES DAILY
COMMUNITY

## 2023-01-31 RX ORDER — POTASSIUM CHLORIDE 20MEQ/15ML
LIQUID (ML) ORAL 2 TIMES DAILY
COMMUNITY

## 2023-01-31 RX ORDER — POLYETHYLENE GLYCOL 3350 17 G/17G
POWDER, FOR SOLUTION ORAL DAILY
COMMUNITY
Start: 2021-07-19

## 2023-01-31 RX ORDER — ATOGEPANT 30 MG/1
TABLET ORAL DAILY
COMMUNITY

## 2023-01-31 RX ORDER — DOCUSATE SODIUM 100 MG/1
CAPSULE, LIQUID FILLED ORAL DAILY
COMMUNITY
Start: 2021-07-19

## 2023-01-31 RX ORDER — MOMETASONE FUROATE 1 MG/ML
SOLUTION TOPICAL 2 TIMES DAILY
Status: ON HOLD | COMMUNITY
End: 2023-02-08 | Stop reason: HOSPADM

## 2023-01-31 RX ORDER — LINACLOTIDE 290 UG/1
CAPSULE, GELATIN COATED ORAL DAILY
COMMUNITY
Start: 2022-08-10

## 2023-01-31 RX ORDER — ATORVASTATIN CALCIUM 40 MG/1
TABLET, FILM COATED ORAL DAILY
COMMUNITY

## 2023-01-31 RX ORDER — AMMONIUM LACTATE 12 G/100G
LOTION TOPICAL 2 TIMES DAILY
COMMUNITY

## 2023-01-31 RX ORDER — NALOXONE HYDROCHLORIDE 4 MG/.1ML
SPRAY NASAL PRN
COMMUNITY
Start: 2022-05-18

## 2023-01-31 RX ORDER — DUPILUMAB 300 MG/2ML
300 INJECTION, SOLUTION SUBCUTANEOUS
COMMUNITY

## 2023-01-31 RX ORDER — ALLOPURINOL 300 MG/1
TABLET ORAL DAILY
COMMUNITY

## 2023-01-31 NOTE — PERIOP NOTE
Patient verified name and . Order for consent NOT found in EHR at time of PAT visit. Unable to verify case posting against order; surgery verified by patient. Type 1B surgery, phone assessment complete. Orders not received. Labs per surgeon: none ordered  Labs per anesthesia protocol: K+, Hgb s/h for DOS- patient states she has to depend on others for rides    Chart placed for anesthesia review- patient states she needs solu cortef IV prior to surgery and can only have LR- hx of ren's     Patient answered medical/surgical history questions at their best of ability. All prior to admission medications documented in [facility]. Patient instructed to take the following medications the day of surgery according to anesthesia guidelines with a small sip of water: MSIR if needed, oxycontin,  albuterol inhaler if needed and bring it to the hospital, dulera inhaler, singulair, spiriva, zofran, atorvastatin,  metoprolol, azelastine nasal spray, allopurinol, relistor, omeprazole. Hold all vitamins and supplements 7 days prior to surgery and NSAIDS 5 days prior to surgery. Patient instructed on the following:    > Arrive at [facility], time of arrival to be called the day before by 1700  > NPO after midnight, unless otherwise indicated, including gum, mints, and ice chips  > Responsible adult must drive patient to the hospital, stay during surgery, and patient will need supervision 24 hours after anesthesia  > Use antibacterial soap in shower the night before surgery and on the morning of surgery  > All piercings must be removed prior to arrival.    > Leave all valuables (money and jewelry) at home but bring insurance card and ID on DOS.   > You may be required to pay a deductible or co-pay on the day of your procedure. You can pre-pay by calling [phone] if your surgery is at the [facility] or [phone] if your surgery is at the [facility].   > Do not wear make-up, nail polish, lotions, cologne, perfumes, powders, or oil on skin. Artificial nails are not permitted. Patient demanded to speak with someone in charge, because she feels it's unreasonable not to know the time of arrival prior to Friday.   Message left for charge RN to call the patient

## 2023-02-05 ENCOUNTER — ANESTHESIA EVENT (OUTPATIENT)
Dept: SURGERY | Age: 43
End: 2023-02-05
Payer: COMMERCIAL

## 2023-02-06 ENCOUNTER — ANESTHESIA (OUTPATIENT)
Dept: SURGERY | Age: 43
End: 2023-02-06
Payer: COMMERCIAL

## 2023-02-06 ENCOUNTER — HOSPITAL ENCOUNTER (OUTPATIENT)
Age: 43
Setting detail: OBSERVATION
Discharge: HOME | End: 2023-02-08
Admitting: FAMILY MEDICINE
Payer: COMMERCIAL

## 2023-02-06 PROBLEM — G89.18 POST-OP PAIN: Status: ACTIVE | Noted: 2023-02-06

## 2023-02-06 PROBLEM — E27.1 ADDISON DISEASE (HCC): Status: ACTIVE | Noted: 2023-02-06

## 2023-02-06 LAB
ANION GAP SERPL CALC-SCNC: 7 MMOL/L (ref 2–11)
BUN SERPL-MCNC: 12 MG/DL (ref 6–23)
CALCIUM SERPL-MCNC: 8.9 MG/DL (ref 8.3–10.4)
CHLORIDE SERPL-SCNC: 101 MMOL/L (ref 101–110)
CO2 SERPL-SCNC: 28 MMOL/L (ref 21–32)
CREAT SERPL-MCNC: 1.1 MG/DL (ref 0.6–1)
GLUCOSE SERPL-MCNC: 98 MG/DL (ref 65–100)
HGB BLD-MCNC: 12.3 G/DL (ref 11.7–15.4)
POTASSIUM BLD-SCNC: 3.1 MMOL/L (ref 3.5–5.1)
POTASSIUM SERPL-SCNC: 3.3 MMOL/L (ref 3.5–5.1)
SODIUM SERPL-SCNC: 136 MMOL/L (ref 133–143)

## 2023-02-06 PROCEDURE — 3700000001 HC ADD 15 MINUTES (ANESTHESIA)

## 2023-02-06 PROCEDURE — 6360000002 HC RX W HCPCS: Performed by: NURSE ANESTHETIST, CERTIFIED REGISTERED

## 2023-02-06 PROCEDURE — 6370000000 HC RX 637 (ALT 250 FOR IP): Performed by: FAMILY MEDICINE

## 2023-02-06 PROCEDURE — 6360000002 HC RX W HCPCS: Performed by: ANESTHESIOLOGY

## 2023-02-06 PROCEDURE — 3700000000 HC ANESTHESIA ATTENDED CARE

## 2023-02-06 PROCEDURE — 3600000002 HC SURGERY LEVEL 2 BASE

## 2023-02-06 PROCEDURE — 2580000003 HC RX 258: Performed by: ANESTHESIOLOGY

## 2023-02-06 PROCEDURE — 6370000000 HC RX 637 (ALT 250 FOR IP): Performed by: ANESTHESIOLOGY

## 2023-02-06 PROCEDURE — 84132 ASSAY OF SERUM POTASSIUM: CPT

## 2023-02-06 PROCEDURE — 7100000000 HC PACU RECOVERY - FIRST 15 MIN

## 2023-02-06 PROCEDURE — 85018 HEMOGLOBIN: CPT

## 2023-02-06 PROCEDURE — G0378 HOSPITAL OBSERVATION PER HR: HCPCS

## 2023-02-06 PROCEDURE — 3600000012 HC SURGERY LEVEL 2 ADDTL 15MIN

## 2023-02-06 PROCEDURE — 6370000000 HC RX 637 (ALT 250 FOR IP): Performed by: INTERNAL MEDICINE

## 2023-02-06 PROCEDURE — 2709999900 HC NON-CHARGEABLE SUPPLY

## 2023-02-06 PROCEDURE — 80048 BASIC METABOLIC PNL TOTAL CA: CPT

## 2023-02-06 PROCEDURE — 2500000003 HC RX 250 WO HCPCS: Performed by: NURSE ANESTHETIST, CERTIFIED REGISTERED

## 2023-02-06 PROCEDURE — 2580000003 HC RX 258: Performed by: FAMILY MEDICINE

## 2023-02-06 PROCEDURE — 6370000000 HC RX 637 (ALT 250 FOR IP)

## 2023-02-06 PROCEDURE — 7100000001 HC PACU RECOVERY - ADDTL 15 MIN

## 2023-02-06 PROCEDURE — 6360000002 HC RX W HCPCS: Performed by: INTERNAL MEDICINE

## 2023-02-06 RX ORDER — OXYCODONE HYDROCHLORIDE 5 MG/1
5 TABLET ORAL EVERY 4 HOURS PRN
Status: DISCONTINUED | OUTPATIENT
Start: 2023-02-06 | End: 2023-02-06

## 2023-02-06 RX ORDER — HYDROCORTISONE 5 MG/1
5 TABLET ORAL EVERY EVENING
Status: DISCONTINUED | OUTPATIENT
Start: 2023-02-06 | End: 2023-02-06

## 2023-02-06 RX ORDER — HYDROMORPHONE HYDROCHLORIDE 1 MG/ML
0.5 INJECTION, SOLUTION INTRAMUSCULAR; INTRAVENOUS; SUBCUTANEOUS EVERY 5 MIN PRN
Status: DISCONTINUED | OUTPATIENT
Start: 2023-02-06 | End: 2023-02-06

## 2023-02-06 RX ORDER — LABETALOL HYDROCHLORIDE 5 MG/ML
10 INJECTION, SOLUTION INTRAVENOUS
Status: DISCONTINUED | OUTPATIENT
Start: 2023-02-06 | End: 2023-02-06 | Stop reason: HOSPADM

## 2023-02-06 RX ORDER — PHENYLEPHRINE HYDROCHLORIDE 10 MG/ML
INJECTION INTRAVENOUS PRN
Status: DISCONTINUED | OUTPATIENT
Start: 2023-02-06 | End: 2023-02-06 | Stop reason: SDUPTHER

## 2023-02-06 RX ORDER — BUMETANIDE 1 MG/1
2 TABLET ORAL 2 TIMES DAILY
Status: DISCONTINUED | OUTPATIENT
Start: 2023-02-06 | End: 2023-02-08 | Stop reason: HOSPADM

## 2023-02-06 RX ORDER — HYDROMORPHONE HYDROCHLORIDE 1 MG/ML
0.25 INJECTION, SOLUTION INTRAMUSCULAR; INTRAVENOUS; SUBCUTANEOUS EVERY 5 MIN PRN
Status: DISCONTINUED | OUTPATIENT
Start: 2023-02-06 | End: 2023-02-06

## 2023-02-06 RX ORDER — SODIUM CHLORIDE 0.9 % (FLUSH) 0.9 %
5-40 SYRINGE (ML) INJECTION EVERY 12 HOURS SCHEDULED
Status: DISCONTINUED | OUTPATIENT
Start: 2023-02-06 | End: 2023-02-06 | Stop reason: HOSPADM

## 2023-02-06 RX ORDER — ONDANSETRON 2 MG/ML
4 INJECTION INTRAMUSCULAR; INTRAVENOUS
Status: COMPLETED | OUTPATIENT
Start: 2023-02-06 | End: 2023-02-06

## 2023-02-06 RX ORDER — ONDANSETRON 4 MG/1
8 TABLET, ORALLY DISINTEGRATING ORAL EVERY 8 HOURS PRN
Status: DISCONTINUED | OUTPATIENT
Start: 2023-02-06 | End: 2023-02-08 | Stop reason: HOSPADM

## 2023-02-06 RX ORDER — PROPOFOL 10 MG/ML
INJECTION, EMULSION INTRAVENOUS PRN
Status: DISCONTINUED | OUTPATIENT
Start: 2023-02-06 | End: 2023-02-06 | Stop reason: SDUPTHER

## 2023-02-06 RX ORDER — MAGNESIUM OXIDE 400 MG/1
200 TABLET ORAL DAILY
Status: DISCONTINUED | OUTPATIENT
Start: 2023-02-06 | End: 2023-02-06 | Stop reason: CLARIF

## 2023-02-06 RX ORDER — NEOMYCIN SULFATE, POLYMYXIN B SULFATE AND DEXAMETHASONE 3.5; 10000; 1 MG/ML; [USP'U]/ML; MG/ML
1 SUSPENSION/ DROPS OPHTHALMIC EVERY 4 HOURS
Status: COMPLETED | OUTPATIENT
Start: 2023-02-06 | End: 2023-02-06

## 2023-02-06 RX ORDER — PANTOPRAZOLE SODIUM 40 MG/1
40 TABLET, DELAYED RELEASE ORAL
Status: DISCONTINUED | OUTPATIENT
Start: 2023-02-07 | End: 2023-02-08 | Stop reason: HOSPADM

## 2023-02-06 RX ORDER — HYDROCORTISONE 10 MG/1
10 TABLET ORAL DAILY
Status: DISCONTINUED | OUTPATIENT
Start: 2023-02-06 | End: 2023-02-06

## 2023-02-06 RX ORDER — PHENYLEPHRINE HCL 2.5 %
1 DROPS OPHTHALMIC (EYE) ONCE
Status: DISCONTINUED | OUTPATIENT
Start: 2023-02-06 | End: 2023-02-08 | Stop reason: HOSPADM

## 2023-02-06 RX ORDER — ACETAMINOPHEN 325 MG/1
650 TABLET ORAL EVERY 6 HOURS PRN
Status: DISCONTINUED | OUTPATIENT
Start: 2023-02-06 | End: 2023-02-08 | Stop reason: HOSPADM

## 2023-02-06 RX ORDER — HYDROMORPHONE HCL 110MG/55ML
0.5 PATIENT CONTROLLED ANALGESIA SYRINGE INTRAVENOUS EVERY 5 MIN PRN
Status: DISCONTINUED | OUTPATIENT
Start: 2023-02-06 | End: 2023-02-06

## 2023-02-06 RX ORDER — ACETAMINOPHEN 650 MG/1
650 SUPPOSITORY RECTAL EVERY 6 HOURS PRN
Status: DISCONTINUED | OUTPATIENT
Start: 2023-02-06 | End: 2023-02-08 | Stop reason: HOSPADM

## 2023-02-06 RX ORDER — NEOMYCIN SULFATE, POLYMYXIN B SULFATE AND DEXAMETHASONE 3.5; 10000; 1 MG/ML; [USP'U]/ML; MG/ML
1 SUSPENSION/ DROPS OPHTHALMIC EVERY 6 HOURS SCHEDULED
Status: DISCONTINUED | OUTPATIENT
Start: 2023-02-07 | End: 2023-02-08 | Stop reason: HOSPADM

## 2023-02-06 RX ORDER — AZELASTINE HYDROCHLORIDE, FLUTICASONE PROPIONATE 137; 50 UG/1; UG/1
1 SPRAY, METERED NASAL DAILY
Status: DISCONTINUED | OUTPATIENT
Start: 2023-02-06 | End: 2023-02-08 | Stop reason: HOSPADM

## 2023-02-06 RX ORDER — OXYCODONE HYDROCHLORIDE 15 MG/1
30 TABLET, FILM COATED, EXTENDED RELEASE ORAL EVERY 12 HOURS
Status: DISCONTINUED | OUTPATIENT
Start: 2023-02-06 | End: 2023-02-08 | Stop reason: HOSPADM

## 2023-02-06 RX ORDER — FENTANYL CITRATE 50 UG/ML
100 INJECTION, SOLUTION INTRAMUSCULAR; INTRAVENOUS
Status: DISCONTINUED | OUTPATIENT
Start: 2023-02-06 | End: 2023-02-06 | Stop reason: HOSPADM

## 2023-02-06 RX ORDER — SODIUM CHLORIDE, SODIUM LACTATE, POTASSIUM CHLORIDE, CALCIUM CHLORIDE 600; 310; 30; 20 MG/100ML; MG/100ML; MG/100ML; MG/100ML
INJECTION, SOLUTION INTRAVENOUS CONTINUOUS
Status: DISCONTINUED | OUTPATIENT
Start: 2023-02-06 | End: 2023-02-06 | Stop reason: HOSPADM

## 2023-02-06 RX ORDER — METOPROLOL SUCCINATE 25 MG/1
25 TABLET, EXTENDED RELEASE ORAL DAILY
Status: DISCONTINUED | OUTPATIENT
Start: 2023-02-07 | End: 2023-02-08 | Stop reason: HOSPADM

## 2023-02-06 RX ORDER — SODIUM CHLORIDE 9 MG/ML
INJECTION, SOLUTION INTRAVENOUS CONTINUOUS
Status: DISCONTINUED | OUTPATIENT
Start: 2023-02-06 | End: 2023-02-08 | Stop reason: HOSPADM

## 2023-02-06 RX ORDER — ONDANSETRON 4 MG/1
4 TABLET, ORALLY DISINTEGRATING ORAL EVERY 8 HOURS PRN
Status: DISCONTINUED | OUTPATIENT
Start: 2023-02-06 | End: 2023-02-06

## 2023-02-06 RX ORDER — PHENYLEPHRINE HCL 2.5 %
DROPS OPHTHALMIC (EYE) PRN
Status: DISCONTINUED | OUTPATIENT
Start: 2023-02-06 | End: 2023-02-06 | Stop reason: HOSPADM

## 2023-02-06 RX ORDER — ONDANSETRON 2 MG/ML
INJECTION INTRAMUSCULAR; INTRAVENOUS PRN
Status: DISCONTINUED | OUTPATIENT
Start: 2023-02-06 | End: 2023-02-06 | Stop reason: SDUPTHER

## 2023-02-06 RX ORDER — HYDROMORPHONE HCL 110MG/55ML
0.5 PATIENT CONTROLLED ANALGESIA SYRINGE INTRAVENOUS ONCE
Status: DISCONTINUED | OUTPATIENT
Start: 2023-02-06 | End: 2023-02-06

## 2023-02-06 RX ORDER — LIDOCAINE HYDROCHLORIDE 10 MG/ML
1 INJECTION, SOLUTION INFILTRATION; PERINEURAL
Status: DISCONTINUED | OUTPATIENT
Start: 2023-02-06 | End: 2023-02-06 | Stop reason: HOSPADM

## 2023-02-06 RX ORDER — PROPARACAINE HYDROCHLORIDE 5 MG/ML
1 SOLUTION/ DROPS OPHTHALMIC ONCE
Status: DISCONTINUED | OUTPATIENT
Start: 2023-02-06 | End: 2023-02-08 | Stop reason: HOSPADM

## 2023-02-06 RX ORDER — MONTELUKAST SODIUM 10 MG/1
10 TABLET ORAL DAILY
Status: DISCONTINUED | OUTPATIENT
Start: 2023-02-06 | End: 2023-02-08 | Stop reason: HOSPADM

## 2023-02-06 RX ORDER — ATORVASTATIN CALCIUM 40 MG/1
40 TABLET, FILM COATED ORAL NIGHTLY
Status: DISCONTINUED | OUTPATIENT
Start: 2023-02-06 | End: 2023-02-08 | Stop reason: HOSPADM

## 2023-02-06 RX ORDER — ONDANSETRON 2 MG/ML
4 INJECTION INTRAMUSCULAR; INTRAVENOUS EVERY 6 HOURS PRN
Status: DISCONTINUED | OUTPATIENT
Start: 2023-02-06 | End: 2023-02-06

## 2023-02-06 RX ORDER — TETRACAINE HYDROCHLORIDE 5 MG/ML
SOLUTION OPHTHALMIC PRN
Status: DISCONTINUED | OUTPATIENT
Start: 2023-02-06 | End: 2023-02-06 | Stop reason: HOSPADM

## 2023-02-06 RX ORDER — SODIUM CHLORIDE 9 MG/ML
INJECTION, SOLUTION INTRAVENOUS PRN
Status: DISCONTINUED | OUTPATIENT
Start: 2023-02-06 | End: 2023-02-06

## 2023-02-06 RX ORDER — METOPROLOL SUCCINATE 25 MG/1
25 TABLET, EXTENDED RELEASE ORAL
Status: COMPLETED | OUTPATIENT
Start: 2023-02-06 | End: 2023-02-06

## 2023-02-06 RX ORDER — ALLOPURINOL 300 MG/1
300 TABLET ORAL DAILY
Status: DISCONTINUED | OUTPATIENT
Start: 2023-02-06 | End: 2023-02-08 | Stop reason: HOSPADM

## 2023-02-06 RX ORDER — HYDROMORPHONE HCL 110MG/55ML
0.5 PATIENT CONTROLLED ANALGESIA SYRINGE INTRAVENOUS
Status: COMPLETED | OUTPATIENT
Start: 2023-02-06 | End: 2023-02-06

## 2023-02-06 RX ORDER — HYDROCORTISONE 5 MG/1
15 TABLET ORAL EVERY EVENING
Status: DISCONTINUED | OUTPATIENT
Start: 2023-02-07 | End: 2023-02-08 | Stop reason: HOSPADM

## 2023-02-06 RX ORDER — ONDANSETRON 2 MG/ML
8 INJECTION INTRAMUSCULAR; INTRAVENOUS EVERY 6 HOURS PRN
Status: DISCONTINUED | OUTPATIENT
Start: 2023-02-06 | End: 2023-02-08 | Stop reason: HOSPADM

## 2023-02-06 RX ORDER — SODIUM CHLORIDE 9 MG/ML
INJECTION, SOLUTION INTRAVENOUS PRN
Status: DISCONTINUED | OUTPATIENT
Start: 2023-02-06 | End: 2023-02-08 | Stop reason: HOSPADM

## 2023-02-06 RX ORDER — MORPHINE SULFATE 15 MG/1
15 TABLET ORAL EVERY 8 HOURS PRN
Status: DISCONTINUED | OUTPATIENT
Start: 2023-02-06 | End: 2023-02-08

## 2023-02-06 RX ORDER — LIDOCAINE HYDROCHLORIDE 20 MG/ML
INJECTION, SOLUTION EPIDURAL; INFILTRATION; INTRACAUDAL; PERINEURAL PRN
Status: DISCONTINUED | OUTPATIENT
Start: 2023-02-06 | End: 2023-02-06 | Stop reason: SDUPTHER

## 2023-02-06 RX ORDER — SODIUM CHLORIDE 0.9 % (FLUSH) 0.9 %
5-40 SYRINGE (ML) INJECTION PRN
Status: DISCONTINUED | OUTPATIENT
Start: 2023-02-06 | End: 2023-02-08 | Stop reason: HOSPADM

## 2023-02-06 RX ORDER — NEOMYCIN SULFATE, POLYMYXIN B SULFATE, AND DEXAMETHASONE 3.5; 10000; 1 MG/G; [USP'U]/G; MG/G
OINTMENT OPHTHALMIC
Status: DISCONTINUED | OUTPATIENT
Start: 2023-02-06 | End: 2023-02-08 | Stop reason: HOSPADM

## 2023-02-06 RX ORDER — HYDRALAZINE HYDROCHLORIDE 20 MG/ML
10 INJECTION INTRAMUSCULAR; INTRAVENOUS
Status: DISCONTINUED | OUTPATIENT
Start: 2023-02-06 | End: 2023-02-06 | Stop reason: HOSPADM

## 2023-02-06 RX ORDER — ONDANSETRON 2 MG/ML
INJECTION INTRAMUSCULAR; INTRAVENOUS PRN
Status: DISCONTINUED | OUTPATIENT
Start: 2023-02-06 | End: 2023-02-06

## 2023-02-06 RX ORDER — NEOMYCIN SULFATE, POLYMYXIN B SULFATE, AND DEXAMETHASONE 3.5; 10000; 1 MG/G; [USP'U]/G; MG/G
OINTMENT OPHTHALMIC ONCE
Status: DISCONTINUED | OUTPATIENT
Start: 2023-02-06 | End: 2023-02-08 | Stop reason: HOSPADM

## 2023-02-06 RX ORDER — SODIUM CHLORIDE 0.9 % (FLUSH) 0.9 %
5-40 SYRINGE (ML) INJECTION EVERY 12 HOURS SCHEDULED
Status: DISCONTINUED | OUTPATIENT
Start: 2023-02-06 | End: 2023-02-08 | Stop reason: HOSPADM

## 2023-02-06 RX ORDER — POLYETHYLENE GLYCOL 3350 17 G/17G
17 POWDER, FOR SOLUTION ORAL DAILY PRN
Status: DISCONTINUED | OUTPATIENT
Start: 2023-02-06 | End: 2023-02-08 | Stop reason: HOSPADM

## 2023-02-06 RX ORDER — SODIUM CHLORIDE 0.9 % (FLUSH) 0.9 %
5-40 SYRINGE (ML) INJECTION PRN
Status: DISCONTINUED | OUTPATIENT
Start: 2023-02-06 | End: 2023-02-06 | Stop reason: HOSPADM

## 2023-02-06 RX ORDER — LANOLIN ALCOHOL/MO/W.PET/CERES
200 CREAM (GRAM) TOPICAL DAILY
Status: DISCONTINUED | OUTPATIENT
Start: 2023-02-07 | End: 2023-02-08 | Stop reason: HOSPADM

## 2023-02-06 RX ORDER — OXYCODONE HYDROCHLORIDE 5 MG/1
5 TABLET ORAL
Status: DISCONTINUED | OUTPATIENT
Start: 2023-02-06 | End: 2023-02-06 | Stop reason: HOSPADM

## 2023-02-06 RX ADMIN — SODIUM CHLORIDE: 9 INJECTION, SOLUTION INTRAVENOUS at 22:26

## 2023-02-06 RX ADMIN — HYDROMORPHONE HYDROCHLORIDE 0.5 MG: 2 INJECTION, SOLUTION INTRAMUSCULAR; INTRAVENOUS; SUBCUTANEOUS at 13:44

## 2023-02-06 RX ADMIN — HYDROCORTISONE SODIUM SUCCINATE 100 MG: 100 INJECTION, POWDER, FOR SOLUTION INTRAMUSCULAR; INTRAVENOUS at 13:21

## 2023-02-06 RX ADMIN — HYDROMORPHONE HYDROCHLORIDE 0.5 MG: 1 INJECTION, SOLUTION INTRAMUSCULAR; INTRAVENOUS; SUBCUTANEOUS at 22:12

## 2023-02-06 RX ADMIN — ONDANSETRON 4 MG: 2 INJECTION INTRAMUSCULAR; INTRAVENOUS at 16:19

## 2023-02-06 RX ADMIN — ONDANSETRON 4 MG: 4 TABLET, ORALLY DISINTEGRATING ORAL at 17:50

## 2023-02-06 RX ADMIN — NEOMYCIN SULFATE, POLYMYXIN B SULFATE AND DEXAMETHASONE 1 DROP: 3.5; 10000; 1 SUSPENSION/ DROPS OPHTHALMIC at 22:55

## 2023-02-06 RX ADMIN — OXYCODONE HYDROCHLORIDE 30 MG: 15 TABLET, FILM COATED, EXTENDED RELEASE ORAL at 22:55

## 2023-02-06 RX ADMIN — ONDANSETRON 4 MG: 2 INJECTION INTRAMUSCULAR; INTRAVENOUS at 15:05

## 2023-02-06 RX ADMIN — BUMETANIDE 2 MG: 1 TABLET ORAL at 22:14

## 2023-02-06 RX ADMIN — ALLOPURINOL 300 MG: 300 TABLET ORAL at 22:14

## 2023-02-06 RX ADMIN — SODIUM CHLORIDE, POTASSIUM CHLORIDE, SODIUM LACTATE AND CALCIUM CHLORIDE: 600; 310; 30; 20 INJECTION, SOLUTION INTRAVENOUS at 12:45

## 2023-02-06 RX ADMIN — PROPOFOL 200 MG: 10 INJECTION, EMULSION INTRAVENOUS at 14:59

## 2023-02-06 RX ADMIN — NEOMYCIN SULFATE, POLYMYXIN B SULFATE, AND DEXAMETHASONE: 3.5; 10000; 1 OINTMENT OPHTHALMIC at 22:14

## 2023-02-06 RX ADMIN — MONTELUKAST 10 MG: 10 TABLET, FILM COATED ORAL at 18:58

## 2023-02-06 RX ADMIN — ATORVASTATIN CALCIUM 40 MG: 40 TABLET, FILM COATED ORAL at 19:52

## 2023-02-06 RX ADMIN — LIDOCAINE HYDROCHLORIDE 60 MG: 20 INJECTION, SOLUTION EPIDURAL; INFILTRATION; INTRACAUDAL; PERINEURAL at 14:59

## 2023-02-06 RX ADMIN — OXYCODONE 5 MG: 5 TABLET ORAL at 17:51

## 2023-02-06 RX ADMIN — PHENYLEPHRINE HYDROCHLORIDE 100 MCG: 10 INJECTION INTRAVENOUS at 14:59

## 2023-02-06 RX ADMIN — HYDROMORPHONE HYDROCHLORIDE 0.5 MG: 2 INJECTION, SOLUTION INTRAMUSCULAR; INTRAVENOUS; SUBCUTANEOUS at 16:19

## 2023-02-06 RX ADMIN — NEOMYCIN SULFATE, POLYMYXIN B SULFATE AND DEXAMETHASONE 1 DROP: 3.5; 10000; 1 SUSPENSION/ DROPS OPHTHALMIC at 19:52

## 2023-02-06 RX ADMIN — SODIUM CHLORIDE, PRESERVATIVE FREE 10 ML: 5 INJECTION INTRAVENOUS at 22:28

## 2023-02-06 RX ADMIN — METOPROLOL SUCCINATE 25 MG: 25 TABLET, EXTENDED RELEASE ORAL at 13:31

## 2023-02-06 ASSESSMENT — PAIN DESCRIPTION - LOCATION
LOCATION: EYE
LOCATION: EYE

## 2023-02-06 ASSESSMENT — PAIN DESCRIPTION - PAIN TYPE: TYPE: SURGICAL PAIN

## 2023-02-06 ASSESSMENT — PAIN SCALES - GENERAL
PAINLEVEL_OUTOF10: 2
PAINLEVEL_OUTOF10: 10
PAINLEVEL_OUTOF10: 2
PAINLEVEL_OUTOF10: 7

## 2023-02-06 ASSESSMENT — PAIN DESCRIPTION - ORIENTATION
ORIENTATION: RIGHT;LEFT
ORIENTATION: LEFT;RIGHT

## 2023-02-06 ASSESSMENT — PAIN - FUNCTIONAL ASSESSMENT
PAIN_FUNCTIONAL_ASSESSMENT: 0-10
PAIN_FUNCTIONAL_ASSESSMENT: ACTIVITIES ARE NOT PREVENTED

## 2023-02-06 ASSESSMENT — PAIN DESCRIPTION - DESCRIPTORS: DESCRIPTORS: ACHING

## 2023-02-06 ASSESSMENT — PAIN DESCRIPTION - FREQUENCY: FREQUENCY: INTERMITTENT

## 2023-02-06 ASSESSMENT — PAIN DESCRIPTION - ONSET: ONSET: GRADUAL

## 2023-02-06 NOTE — ANESTHESIA PROCEDURE NOTES
Airway  Date/Time: 2/6/2023 3:03 PM  Urgency: elective    Airway not difficult    General Information and Staff    Patient location during procedure: OR  Resident/CRNA: TAMMY Solis - CRNA    Indications and Patient Condition  Indications for airway management: anesthesia  Spontaneous Ventilation: absent  Sedation level: deep  Preoxygenated: yes  Patient position: sniffing  MILS not maintained throughout  Mask difficulty assessment: not attempted    Final Airway Details  Final airway type: supraglottic airway      Successful airway: oropharyngeal  Size 4     Number of attempts at approach: 1  Number of other approaches attempted: 0    Additional Comments  Atrumatc insertion. Gums, teeth, lips baseline.  Intubation by SRNA  no

## 2023-02-06 NOTE — PERIOP NOTE
TRANSFER - OUT REPORT:    Verbal report given to [facility] on Reginald Hernández  being transferred to room 7122 for routine post-op       Report consisted of patient's Situation, Background, Assessment and   Recommendations(SBAR). Information from the following report(s) Nurse Handoff Report, Index, Surgery Report, Intake/Output, and MAR was reviewed with the receiving nurse. Butler Assessment: No data recorded  Lines:   Peripheral IV 02/06/23 Right Hand (Active)   Site Assessment Clean, dry & intact 02/06/23 1551   Line Status Infusing 02/06/23 1551   Phlebitis Assessment No symptoms 02/06/23 1551   Infiltration Assessment 0 02/06/23 1551   Alcohol Cap Used No 02/06/23 1551   Dressing Status Clean, dry & intact 02/06/23 1551   Dressing Type Transparent 02/06/23 1551        Opportunity for questions and clarification was provided. Patient transported with:  Tech, and patient belongings.

## 2023-02-06 NOTE — PROGRESS NOTES
Patient is a 43yo female who is s/p bilateral medial rectus muscle resections today. She will be admitted overnight by the hospitalist service (Dr. An Singh) for observation and given her history of Micro's Disease and otherwise complex medical history. Her case was discussed with the admitting doctor mentioned above. She is to get maxitrol drops (1 drop each eye) x 2 times today and maxitrol ointment at bedtime into each eye. She will begin maxitrol drops tomorrow QID in each eye and maxitrol ointment at bedtime in both eyes. No water directly into her eyes x 1 week. No bending restrictions otherwise. Her post-op visit is in 1 month at our [facility] office. Please call [facility] with any questions or concerns. [phone] is our direct clinic number. After hours please call [facility] to speak with the on-call ophthalmologist ([phone]). Appreciate Medicine Service's assistance with this patient. Chun Hong MD  Ophthalmology  [facility]

## 2023-02-06 NOTE — ANESTHESIA POSTPROCEDURE EVALUATION
Department of Anesthesiology  Postprocedure Note    Patient: Dariela Gann  MRN: [id number]  YOB: 1980  Date of evaluation: 2/6/2023      Procedure Summary     Date: 02/06/23 Room / Location: [facility] OP OR 07 / [facility]    Anesthesia Start: 1447 Anesthesia Stop: 1552    Procedure: Bilateral Medial Rectus Resection (Bilateral: Eye) Diagnosis:       Intermittent alternating exotropia      (Intermittent alternating exotropia [H50.34])    Surgeons: Tirso Sequeira MD Responsible Provider: Ashley Short MD    Anesthesia Type: general ASA Status: 3          Anesthesia Type: No value filed.     Román Phase I: Román Score: 9    Román Phase II:        Anesthesia Post Evaluation    Patient location during evaluation: PACU  Patient participation: complete - patient participated  Level of consciousness: awake and alert  Airway patency: patent  Nausea & Vomiting: no nausea and no vomiting  Complications: no  Cardiovascular status: hemodynamically stable  Respiratory status: acceptable, nonlabored ventilation and spontaneous ventilation  Hydration status: euvolemic  Comments: /75   Pulse 84   Temp 97.1 °F (36.2 °C) (Temporal)   Resp 17   Ht 5' 4\" (1.626 m)   Wt 150 lb (68 kg)   SpO2 94%   BMI 25.75 kg/m²     Multimodal analgesia pain management approach

## 2023-02-06 NOTE — ANESTHESIA PRE PROCEDURE
Department of Anesthesiology  Preprocedure Note       Name:  Genesis Chavez   Age:  36 y.o.  :  1980                                          MRN:  [id number]         Date:  2023      Surgeon: Mac Aguilar):  Catracho Aguilar MD    Procedure: Procedure(s):  Bilateral Medial Rectus Resection    Medications prior to admission:   Prior to Admission medications    Medication Sig Start Date End Date Taking?  Authorizing Provider   docusate sodium (COLACE) 100 MG capsule daily 21  Yes Historical Provider, MD   linaclotide Sav Sharmila) 290 MCG CAPS capsule daily 8/10/22  Yes Historical Provider, MD   Methylnaltrexone Bromide (RELISTOR) 150 MG TABS in the morning and at bedtime 2 IN THE MORNING 1 IN PM 22  Yes Historical Provider, MD   naloxone 4 MG/0.1ML LIQD nasal spray as needed 22  Yes Historical Provider, MD   polyethylene glycol (MIRALAX) 17 GM/SCOOP powder daily 21  Yes Historical Provider, MD   Lactobacillus (ACIDOPHILUS/BIFIDUS PO) Take 1 packet by mouth 2 times daily    Historical Provider, MD   DIHYDROERGOTAMINE MESYLATE IJ as needed    Historical Provider, MD   mometasone-formoterol (Mary Kay Sane) 200-5 MCG/ACT inhaler in the morning and at bedtime    Historical Provider, MD   Pancrelipase, Lip-Prot-Amyl, (ZENPEP PO) Take by mouth 3 times daily (with meals)    Historical Provider, MD   nystatin-triamcinolone (MYCOLOG II) 915396-4.1 UNIT/GM-% cream Apply 1 application topically 2 times daily    Historical Provider, MD   tiotropium (SPIRIVA RESPIMAT) 1.25 MCG/ACT AERS inhaler daily    Historical Provider, MD   Cholecalciferol (VITAMIN D3) 125 MCG (5000 UT) CAPS daily    Historical Provider, MD   allopurinol (ZYLOPRIM) 300 MG tablet daily    Historical Provider, MD   ammonium lactate (LAC-HYDRIN) 12 % lotion in the morning and at bedtime    Historical Provider, MD   Atogepant (Gaile Cristofer) 30 MG TABS daily    Historical Provider, MD   atorvastatin (LIPITOR) 40 MG tablet daily    Historical Provider, MD   Azelastine-Fluticasone 137-50 MCG/ACT SUSP daily    Historical Provider, MD   bumetanide (BUMEX) 2 MG tablet in the morning and at bedtime    Historical Provider, MD   dupilumab (DUPIXENT) 300 MG/2ML SOPN injection 300 mg every 14 days    Historical Provider, MD   Ibrexafungerp Citrate 150 MG TABS 2 tablets every 12 hours    Historical Provider, MD   itraconazole (SPORANOX) 10 MG/ML solution daily    Historical Provider, MD   ketoconazole (NIZORAL) 2 % shampoo daily    Historical Provider, MD   magnesium oxide (MAG-OX) 400 MG tablet daily    Historical Provider, MD   mometasone (ELOCON) 0.1 % lotion in the morning and at bedtime    Historical Provider, MD   nystatin-triamcinolone (MYCOLOG II) 330999-1.1 UNIT/GM-% cream in the morning and at bedtime    Historical Provider, MD   potassium chloride 20 MEQ/15ML (10%) oral solution in the morning and at bedtime    Historical Provider, MD   predniSONE (DELTASONE) 20 MG tablet as needed    Historical Provider, MD   triamcinolone (KENALOG) 0.1 % ointment as needed    Historical Provider, MD   ESTRADIOL PO Take by mouth daily    Ar Automatic Reconciliation   albuterol sulfate  (90 Base) MCG/ACT inhaler Inhale 2 puffs into the lungs as needed    Ar Automatic Reconciliation   alendronate (FOSAMAX) 70 MG tablet Take 70 mg by mouth every 7 days Takes on Saturday    Ar Automatic Reconciliation   cyanocobalamin 100 MCG tablet Take 2,500 mcg by mouth daily    Ar Automatic Reconciliation   EPINEPHrine (ADRENACLICK) 6.31 OR/3.06IX SOAJ injection Inject into the muscle as needed    Ar Automatic Reconciliation   hydrocortisone (CORTEF) 10 MG tablet Take 10 mg by mouth daily    Ar Automatic Reconciliation   hydrocortisone (CORTEF) 5 MG tablet Take 5 mg by mouth every evening    Ar Automatic Reconciliation   metoprolol succinate (TOPROL XL) 25 MG extended release tablet Take 25 mg by mouth daily    Ar Automatic Reconciliation   montelukast (SINGULAIR) 10 MG tablet Take 10 mg by mouth daily    Ar Automatic Reconciliation   morphine (MSIR) 15 MG tablet Take 15 mg by mouth every 8 hours as needed. Ar Automatic Reconciliation   omeprazole (PRILOSEC) 20 MG delayed release capsule Take 20 mg by mouth 2 times daily    Ar Automatic Reconciliation   ondansetron (ZOFRAN) 8 MG tablet Take 8 mg by mouth in the morning, at noon, and at bedtime ODT    Ar Automatic Reconciliation   oxyCODONE (OXYCONTIN) 30 MG T12A extended release tablet Take 30 mg by mouth every 12 hours. Ar Automatic Reconciliation   Potassium Citrate ER 15 MEQ (1620 MG) TBCR Take 15 mEq by mouth 2 times daily    Ar Automatic Reconciliation       Current medications:    Current Facility-Administered Medications   Medication Dose Route Frequency Provider Last Rate Last Admin    lidocaine 1 % injection 1 mL  1 mL IntraDERmal Once PRN Rolley Labor Sb, DO        fentaNYL (SUBLIMAZE) injection 100 mcg  100 mcg IntraVENous Once PRN Rolley Labor Tiaraey, DO        lactated ringers IV soln infusion   IntraVENous Continuous Rolley Labor Tiaraey,  mL/hr at 02/06/23 1308 NoRateChange at 02/06/23 1308    sodium chloride flush 0.9 % injection 5-40 mL  5-40 mL IntraVENous 2 times per day Rolley Labor Friskey, DO        sodium chloride flush 0.9 % injection 5-40 mL  5-40 mL IntraVENous PRN Rolley Labor Friskey, DO        proparacaine (ALCAINE) 0.5 % ophthalmic solution 1 drop  1 drop Both Eyes Once Addis Barbour MD        phenylephrine (MYDFRIN) 2.5 % ophthalmic solution 1 drop  1 drop Both Eyes Once Addis Barbour MD        neomycin-polymyxin-dexameth ophthalmic ointment   Both Eyes Once Addis Barbour MD        metoprolol succinate (TOPROL XL) extended release tablet 25 mg  25 mg Oral NOW Rolley Labor Sonia Julien DO           Allergies:     Allergies   Allergen Reactions    Latex Anaphylaxis    [place] Oil Anaphylaxis    Apple Anaphylaxis and Rash    Avocado Anaphylaxis    Banana Anaphylaxis    [place] Nut Yasmeen Carolina) Skin Test Anaphylaxis    Gluten Other (See Comments) and Rash     Per allergy testing  Other reaction(s): Rash-A      Ketamine Hives     Hallucinations/rash    Macadamia Nut Oil Anaphylaxis    Metoclopramide Anaphylaxis     dystonia    Mucopolysaccharides Anaphylaxis    Nsaids Anaphylaxis and Rash    Peanut Oil Anaphylaxis     All nuts  Other reaction(s): Anaphylactic shock-A  All nuts  All nuts  All nuts  Other reaction(s): Anaphylactic shock-A  All nuts  All nuts      Pork Allergy Anaphylaxis     Breaks out in hives      Potato Hives     Other reaction(s): Hives/Swelling-A  Other reaction(s): Hives/Swelling-A      Pregabalin Anaphylaxis and Rash    Sesame Seed Extract Allergy Skin Test Anaphylaxis    Sorbitol Rash    Soy Other (See Comments) and Shortness Of Breath     Per allergy testing  Per allergy testing      Strawberry Anaphylaxis     Other reaction(s): Anaphylactic shock-A      Sulfamethoxazole-Trimethoprim Anaphylaxis    Valdecoxib Anaphylaxis and Rash     Other reaction(s): Rash-Allergy  Other reaction(s): Rash-A  Other reaction(s): Rash-Allergy  Other reaction(s): Rash-A      Amitriptyline Other (See Comments)     tartive diskenisia  Tardive dyskinesia per pt  Tardive dyskinesia per pt  Tardive dyskinesia per pt  Other reaction(s): Other (See Comments), Other- (not listed) - Allergy  tartive diskenisia  Tardive dyskinesia per pt  Tardive dyskinesia per pt  Tardive dyskinesia per pt      Aspirin Rash    Bacitracin Rash     Rash, redness, itchiness around site---stiches placed on right knee from fall 3/2022  Rash, redness, itchiness around site---stiches placed on right knee from fall 3/2022      Diazepam Other (See Comments)     tartive dyskinesia  Tardive dyskinesia per pt  Other reaction(s):  Other (See Comments), Other- (not listed) - Allergy  tartive dyskinesia  Tardive dyskinesia per pt      Dicyclomine Other (See Comments)     Tardive dyskinesia per pt  Tardive dyskinesia per pt  Tardive dyskinesia per pt  Other reaction(s): Other (See Comments), Other- (not listed) - Allergy  Tardive dyskinesia per pt  Tardive dyskinesia per pt  Tardive dyskinesia per pt      Gabapentin Hallucinations and Other (See Comments)     Tardive dyskinesia  Tardive dyskinesia per pt  Other reaction(s): Hallucinations, Other (See Comments), Other- (not listed) - Allergy  Tardive dyskinesia  Tardive dyskinesia per pt      Hydroxyzine Other (See Comments)     Tardive dyskinesia per pt    Iodides Rash    Lorazepam Other (See Comments)     tartive diskinesia  Tardive dyskinesia per pt  Other reaction(s): Other (See Comments), Other- (not listed) - Allergy  tartive diskinesia  Tardive dyskinesia per pt      Meperidine Other (See Comments)     tartive dyskinesia  Tardive Dyskinesia  Other reaction(s): Other (See Comments), Other- (not listed) - Allergy  tartive dyskinesia  Tardive Dyskinesia      Midazolam Rash    Olanzapine Other (See Comments)     tartive dyskinesia  Tardive dyskinesia per pt  Other reaction(s): Other (See Comments), Other- (not listed) - Allergy  tartive dyskinesia  Tardive dyskinesia per pt      Ace Inhibitors     Acetazolamide     Allyl Isothiocyanate Other (See Comments)    Barium-Containing Compounds Other (See Comments)     gastroview     Bean Pod Extract Other (See Comments)     Soybeans whole, garbonzo, green beans,lima beans, navy beans, string beans  garbonzo, green beans,lima beans, navy beans, string beans      Cefdinir     Chlorpromazine Other (See Comments)     Dystonic    Cinnamon     Cumin Oil Hives and Other (See Comments)    Dopamine Other (See Comments)     Tartive diskenesia  Other reaction(s):  Other (comments)  Dystonic      Droperidol     Egg Shells Other (See Comments)     Other reaction(s): Rash-A  Whole eggs are the allergy, but can can have as an ingredient    Enoxaparin Sodium     Escitalopram     Gluten Meal Other (See Comments)     Per allergy testing    Hydroxyzine Pamoate Other (See Comments)     DYSTONIC    Influenza Vaccines     Meperidine Hcl     Mustard Seed Hives    Naproxen     Nitrofurantoin     Nutmeg Oil (Myristica Oil) Other (See Comments)     Other reaction(s): Other (See Comments)      Pantoprazole Sodium     Pea Other (See Comments)     Other reaction(s): Rash-A  Other reaction(s): Other (See Comments)  Other reaction(s): Rash-A      Povidone Iodine     Prochlorperazine Edisylate Other (See Comments)     Dystonic    Promethazine Other (See Comments)     Dystonic    Quetiapine     Sesame Seed (Diagnostic)     Shellfish-Derived Products     Ziprasidone Hcl     Apple Cider Vinegar Rash    Azithromycin Rash    Barley Grass Rash    Beef-Derived Products Other (See Comments) and Rash     Per allergy testing  Per allergy testing      Cabbage Rash    Celery (Apium Graveolens Erika. Geri) Skin Test Rash    Charentais Melon (English Melon) Rash    Chicken Allergy Other (See Comments) and Rash     Per allergy testing  Per allergy testing      Clindamycin Rash    Corn-Containing Products Rash    Cucumber Extract Rash    Dill Oil Other (See Comments) and Rash     Other reaction(s):  Other (See Comments)      Foeniculum Vulgare Rash    Gramineae Pollens Rash    Ibuprofen Photosensitivity and Rash    Iodine Rash    Ketorolac Tromethamine Rash    Lac Bovis Rash    Monosodium Glutamate Rash     Other reaction(s): Rash-A  Other reaction(s): Rash-Allergy  Other reaction(s): Rash-A      Nalbuphine Rash    Naproxen Sodium Rash    Other Rash     Apple vinegar, red peppers, banana peppers, green peppers, all peppers, cabbage, cucumbers, sunthoke    Penicillins Rash    Raphanus Sativus Rash    Sulfa Antibiotics Rash    Tramadol Rash    Tylenol [Acetaminophen] Rash       Problem List:    Patient Active Problem List   Diagnosis Code    Wound of left leg S81.802A    Chronic pain syndrome G89.4    Asthma J45.909    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    Chronic abdominal pain R10.9, G89.29    Anemia D64.9    Post-op pain G89.18       Past Medical History:        Diagnosis Date    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[address]     Anemia     supplements    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    Arthritis     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    Asthma     daily inhaler    Chronic pain     Endocrine disease diagnosed 2003    ADDISONS DISEASE    Endometriosis     hysterectomy    GERD (gastroesophageal reflux disease)     medication    Hepatitis 2005    \"steroid induced\"    History of blood transfusion     \"years ago\"    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[address]     Infectious disease 2007    MRSA-2007, community acquired - in [facility] about 6 months - 3 months of which was in coma;  HPV-2009     Nausea & vomiting     Other ill-defined conditions(799.89)     allergies    Pancreatitis, chronic ([address]     last exacerbation 3 weeks ago- hospitalized    PONV (postoperative nausea and vomiting)     medication relieves    Unspecified adverse effect of anesthesia     pt reports \"difficult to sedate\"       Past Surgical History:        Procedure Laterality Date    APPENDECTOMY      CHOLECYSTECTOMY      GYN      hysterectomy    [place]THOPEDIC SURGERY  2010    right [place]    ORTHOPEDIC SURGERY  2010    Left  [place]    OTHER SURGICAL HISTORY  2007    I&D of 9 abcesses -MRSA    SHOULDER ARTHROPLASTY  2015    left    SHOULDER ARTHROPLASTY  12/2011    Right    TONSILLECTOMY         Social History:    Social History     Tobacco Use    Smoking status: Never    Smokeless tobacco: Never   Substance Use Topics    Alcohol use:  No                                Counseling given: Not Answered      Vital Signs (Current):   Vitals:    01/31/23 1322 02/06/23 1159   BP:  (!) 153/79   Pulse:  98   Resp:  18   Temp:  98.3 °F (36.8 °C)   TempSrc:  Oral   SpO2:  97%   Weight: 150 lb (68 kg) 150 lb (68 kg) Height: 5' 4\" (1.626 m)                                               BP Readings from Last 3 Encounters:   02/06/23 (!) 153/79   07/24/22 118/85   06/13/22 (!) 133/92       NPO Status: Time of last liquid consumption: 2200                        Time of last solid consumption: 2200                        Date of last liquid consumption: 02/05/23                        Date of last solid food consumption: 02/05/23    BMI:   Wt Readings from Last 3 Encounters:   02/06/23 150 lb (68 kg)   07/24/22 165 lb (74.8 kg)   06/13/22 170 lb (77.1 kg)     Body mass index is 25.75 kg/m².     CBC:   Lab Results   Component Value Date/Time    WBC 9.1 06/13/2022 10:58 PM    RBC 3.94 06/13/2022 10:58 PM    HGB 12.3 02/06/2023 11:45 AM    HCT 33.5 06/13/2022 10:58 PM    MCV 85.0 06/13/2022 10:58 PM    RDW 14.2 06/13/2022 10:58 PM     06/13/2022 10:58 PM       CMP:   Lab Results   Component Value Date/Time     02/06/2023 11:45 AM    K 3.3 02/06/2023 11:45 AM     02/06/2023 11:45 AM    CO2 28 02/06/2023 11:45 AM    BUN 12 02/06/2023 11:45 AM    CREATININE 1.10 02/06/2023 11:45 AM    GFRAA 54 06/13/2022 10:58 PM    AGRATIO 0.9 04/28/2022 02:21 PM    LABGLOM >60 02/06/2023 11:45 AM    GLUCOSE 98 02/06/2023 11:45 AM    PROT 6.4 06/13/2022 10:58 PM    CALCIUM 8.9 02/06/2023 11:45 AM    BILITOT 0.2 06/13/2022 10:58 PM    ALKPHOS 56 06/13/2022 10:58 PM    ALKPHOS 50 04/28/2022 02:21 PM    AST 8 06/13/2022 10:58 PM    ALT 15 06/13/2022 10:58 PM       POC Tests:   Recent Labs     02/06/23  1219   POCK 3.1*       Coags: No results found for: PROTIME, INR, APTT    HCG (If Applicable):   Lab Results   Component Value Date    PREGTESTUR Negative 06/05/2022        ABGs: No results found for: PHART, PO2ART, FBM1VNS, TQF1NRK, BEART, A1ZQCLON     Type & Screen (If Applicable):  No results found for: LABABO, LABRH    Drug/Infectious Status (If Applicable):  Lab Results   Component Value Date/Time    HIV NONREACTIVE 12/05/2011 09:45 AM       COVID-19 Screening (If Applicable): No results found for: COVID19        Anesthesia Evaluation  Patient summary reviewed history of anesthetic complications:   Airway: Mallampati: II          Dental:    (+) edentulous      Pulmonary:normal exam  breath sounds clear to auscultation  (+) asthma:                            Cardiovascular:  Exercise tolerance: good (>4 METS),   (+) hypertension:, hyperlipidemia        Rhythm: regular  Rate: normal                    Neuro/Psych:   (+) headaches: migraine headaches,             GI/Hepatic/Renal:   (+) GERD: well controlled,          ROS comment: H/o steroid induced hepatitis--resolved. Endo/Other:    (+) electrolyte abnormalities, . ROS comment: Addisons disease Abdominal:             Vascular: negative vascular ROS. Other Findings:           Anesthesia Plan      general     ASA 3     (R/b/i of GA discussed at length with pt. Pt expresses understanding and wishes to proceed, questions answered    Stress dose steroid given in preop)  Induction: intravenous. MIPS: Postoperative opioids intended and Prophylactic antiemetics administered. Anesthetic plan and risks discussed with patient. Plan discussed with CRNA.                     Niko Durant DO   2/6/2023

## 2023-02-06 NOTE — BRIEF OP NOTE
Brief Postoperative Note      Patient: Kerrie Spaulding  YOB: 1980  MRN: [id number]    Date of Procedure: 2/6/2023    Pre-Op Diagnosis: Intermittent alternating exotropia [H50.34]    Post-Op Diagnosis: Same       Procedure(s):  Bilateral Medial Rectus Resection    Surgeon(s):  Darius Karimi MD    Assistant:  * No surgical staff found *    Anesthesia: General    Estimated Blood Loss (mL): Minimal    Complications: None    Specimens:   * No specimens in log *    Implants:  * No implants in log *      Drains: * No LDAs found *    Findings: Conjunctival scarring temporally, Both Eyes (from previous strabismus surgery)    Electronically signed by Darius Karimi MD on 2/6/2023 at 3:46 PM

## 2023-02-06 NOTE — PROGRESS NOTES
TRANSFER - IN REPORT:    Verbal report received from [address]  being received from PACU for ordered procedure      Report consisted of patient's Situation, Background, Assessment and   Recommendations(SBAR). Information from the following report(s) Nurse Handoff Report was reviewed with the receiving nurse. Opportunity for questions and clarification was provided. Assessment completed upon patient's arrival to unit and care assumed.

## 2023-02-06 NOTE — H&P
Hospitalist History and Physical   Admit Date:  2023 11:25 AM   Name:  Angel Mustafa   Age:  36 y.o. Sex:  female  :  1980   MRN:  [id number]   Room:  [facility]/    Presenting Complaint: No chief complaint on file. 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 female with medical history of Eugene's disease, chronic pancreatitis, chronic pain syndrome, being admitted as a direct admission for postop observation. Patient is postop bilateral medial rectus resection for exotropia by ophthalmologist.  Ophthalmology asked us to admit for overnight observation due to history of Eugene's disease and requiring 24-hour observation. Patient is seen at the bedside. Complaining of postop pain. Denies chest pain, palpitation, nausea, vomiting or abdominal pain. Patient lives at home on hydrocortisone 10 mg a.m. and 5 mg p.m. Khris Squires Patient reports she required triple dose postoperatively in the past.  No other concern at this time. 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  * Past History:     Past Medical History:   Diagnosis Date    Eugene disease ([address]     Anemia     supplements    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    Arthritis     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    Asthma     daily inhaler    Chronic pain     Endocrine disease diagnosed 2003    ADDISONS DISEASE    Endometriosis     hysterectomy    GERD (gastroesophageal reflux disease)     medication    Hepatitis 2005    \"steroid induced\"    History of blood transfusion     \"years ago\"    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[address]     Infectious disease 2007    MRSA-2007, community acquired - in [facility]) about 6 months - 3 months of which was in coma;  HPV-2009     Nausea & vomiting     Other ill-defined conditions(799.89)     allergies    Pancreatitis, chronic ([address]     last exacerbation 3 weeks ago- hospitalized    PONV (postoperative nausea and vomiting)     medication relieves    Unspecified adverse effect of anesthesia     pt reports \"difficult to sedate\"       Past Surgical History:   Procedure Laterality Date    APPENDECTOMY      CHOLECYSTECTOMY      GYN      hysterectomy    ORTHOPEDIC SURGERY  2010    right [place]    ORTHOPEDIC SURGERY  2010    Left  [place]    OTHER SURGICAL HISTORY  2007    I&D of 9 abcesses -MRSA    SHOULDER ARTHROPLASTY  2015    left    SHOULDER ARTHROPLASTY  12/2011    Right    TONSILLECTOMY          Social History     Tobacco Use    Smoking status: Never    Smokeless tobacco: Never   Substance Use Topics    Alcohol use: No      Social History     Substance and Sexual Activity   Drug Use No       Family History   Problem Relation Age of Onset    Stroke Maternal Grandfather     Diabetes Maternal Grandmother     Heart Disease Paternal Grandmother     Cancer Mother         THROID CANCER        Immunization History   Administered Date(s) Administered    COVID-19, PFIZER PURPLE top, DILUTE for use, (age 15 y+), 30mcg/0.3mL 08/04/2021, 08/27/2021    Td vaccine (adult) 05/07/2009     Allergies   Allergen Reactions    Latex Anaphylaxis    [place] Oil Anaphylaxis    Apple Anaphylaxis and Rash    Avocado Anaphylaxis    Banana Anaphylaxis    [place] Nut McClure Solum) Skin Test Anaphylaxis    Gluten Other (See Comments) and Rash     Per allergy testing  Other reaction(s): Rash-A      Ketamine Hives     Hallucinations/rash    Macadamia Nut Oil Anaphylaxis    Metoclopramide Anaphylaxis     dystonia    Mucopolysaccharides Anaphylaxis    Nsaids Anaphylaxis and Rash    Peanut Oil Anaphylaxis     All nuts  Other reaction(s): Anaphylactic shock-A  All nuts  All nuts  All nuts  Other reaction(s): Anaphylactic shock-A  All nuts  All nuts      Pork Allergy Anaphylaxis     Breaks out in hives      Potato Hives     Other reaction(s): Hives/Swelling-A  Other reaction(s): Hives/Swelling-A      Pregabalin Anaphylaxis and Rash    Sesame Seed Extract Allergy Skin Test Anaphylaxis    Sorbitol Rash    Soy Other (See Comments) and Shortness Of Breath     Per allergy testing  Per allergy testing      Strawberry Anaphylaxis     Other reaction(s): Anaphylactic shock-A      Sulfamethoxazole-Trimethoprim Anaphylaxis    Valdecoxib Anaphylaxis and Rash     Other reaction(s): Rash-Allergy  Other reaction(s): Rash-A  Other reaction(s): Rash-Allergy  Other reaction(s): Rash-A      Amitriptyline Other (See Comments)     tartive diskenisia  Tardive dyskinesia per pt  Tardive dyskinesia per pt  Tardive dyskinesia per pt  Other reaction(s): Other (See Comments), Other- (not listed) - Allergy  tartive diskenisia  Tardive dyskinesia per pt  Tardive dyskinesia per pt  Tardive dyskinesia per pt      Aspirin Rash    Bacitracin Rash     Rash, redness, itchiness around site---stiches placed on right knee from fall 3/2022  Rash, redness, itchiness around site---stiches placed on right knee from fall 3/2022      Diazepam Other (See Comments)     tartive dyskinesia  Tardive dyskinesia per pt  Other reaction(s):  Other (See Comments), Other- (not listed) - Allergy  tartive dyskinesia  Tardive dyskinesia per pt      Dicyclomine Other (See Comments)     Tardive dyskinesia per pt  Tardive dyskinesia per pt  Tardive dyskinesia per pt  Other reaction(s): Other (See Comments), Other- (not listed) - Allergy  Tardive dyskinesia per pt  Tardive dyskinesia per pt  Tardive dyskinesia per pt      Gabapentin Hallucinations and Other (See Comments)     Tardive dyskinesia  Tardive dyskinesia per pt  Other reaction(s): Hallucinations, Other (See Comments), Other- (not listed) - Allergy  Tardive dyskinesia  Tardive dyskinesia per pt      Hydroxyzine Other (See Comments)     Tardive dyskinesia per pt    Iodides Rash    Lorazepam Other (See Comments)     tartive diskinesia  Tardive dyskinesia per pt  Other reaction(s): Other (See Comments), Other- (not listed) - Allergy  tartive diskinesia  Tardive dyskinesia per pt      Meperidine Other (See Comments)     tartive dyskinesia  Tardive Dyskinesia  Other reaction(s): Other (See Comments), Other- (not listed) - Allergy  tartive dyskinesia  Tardive Dyskinesia      Midazolam Rash    Olanzapine Other (See Comments)     tartive dyskinesia  Tardive dyskinesia per pt  Other reaction(s): Other (See Comments), Other- (not listed) - Allergy  tartive dyskinesia  Tardive dyskinesia per pt      Ace Inhibitors     Acetazolamide     Allyl Isothiocyanate Other (See Comments)    Barium-Containing Compounds Other (See Comments)     gastroview     Lopez Pod Extract Other (See Comments)     Soybeans whole, garbonzo, green beans,lima beans, navy beans, string beans  garbonzo, green beans,lima beans, navy beans, string beans      Cefdinir     Chlorpromazine Other (See Comments)     Dystonic    Cinnamon     Cumin Oil Hives and Other (See Comments)    Dopamine Other (See Comments)     Tartive diskenesia  Other reaction(s):  Other (comments)  Dystonic      Droperidol     Egg Shells Other (See Comments)     Other reaction(s): Rash-A  Whole eggs are the allergy, but can can have as an ingredient    Enoxaparin Sodium     Escitalopram     Gluten Meal Other (See Comments)     Per allergy testing    Hydroxyzine Pamoate Other (See Comments)     DYSTONIC    Influenza Vaccines     Meperidine Hcl     Mustard Seed Hives    Naproxen     Nitrofurantoin     Nutmeg Oil ([place]rist[place] Oil) Other (See Comments)     Other reaction(s): Other (See Comments)      Pantoprazole Sodium     Pea Other (See Comments)     Other reaction(s): Rash-A  Other reaction(s): Other (See Comments)  Other reaction(s): Rash-A      Povidone Iodine     Prochlorperazine Edisylate Other (See Comments)     Dystonic    Promethazine Other (See Comments)     Dystonic    Quetiapine     Sesame Seed (Diagnostic)     Shellfish-Derived Products     Ziprasidone Hcl     Apple Cider Vinegar Rash    Azithromycin Rash    Barley Grass Rash    Beef-Derived Products Other (See Comments) and Rash     Per allergy testing  Per allergy testing      Cabbage Rash    Celery (Apium Graveolens Erika. Geri) Skin Test Rash    Charentais Melon (Armenian Melon) Rash    Chicken Allergy Other (See Comments) and Rash     Per allergy testing  Per allergy testing      Clindamycin Rash    Corn-Containing Products Rash    Cucumber Extract Rash    Dill Oil Other (See Comments) and Rash     Other reaction(s):  Other (See Comments)      Foeniculum Vulgare Rash    Gramineae Pollens Rash    Ibuprofen Photosensitivity and Rash    Iodine Rash    Ketorolac Tromethamine Rash    Lac Bovis Rash    Monosodium Glutamate Rash     Other reaction(s): Rash-A  Other reaction(s): Rash-Allergy  Other reaction(s): Rash-A      Nalbuphine Rash    Naproxen Sodium Rash    Other Rash     Apple vinegar, red peppers, banana peppers, green peppers, all peppers, cabbage, cucumbers, sunthoke    Penicillins Rash    Raphanus Sativus Rash    Sulfa Antibiotics Rash    Tramadol Rash    Tylenol [Acetaminophen] Rash     Prior to Admit Medications:  Current Outpatient Medications   Medication Instructions    albuterol sulfate  (90 Base) MCG/ACT inhaler 2 puffs, Inhalation, PRN    alendronate (FOSAMAX) 70 mg, Oral, EVERY 7 DAYS, Takes on Saturday    allopurinol (ZYLOPRIM) 300 MG tablet DAILY    ammonium lactate (LAC-HYDRIN) 12 % lotion 2 times daily    Atogepant (QULIPTA) 30 MG TABS DAILY    atorvastatin (LIPITOR) 40 MG tablet DAILY    Azelastine-Fluticasone 137-50 MCG/ACT SUSP DAILY    bumetanide (BUMEX) 2 MG tablet 2 times daily    Cholecalciferol (VITAMIN D3) 125 MCG (5000 UT) CAPS DAILY    cyanocobalamin 2,500 mcg, Oral, DAILY    DIHYDROERGOTAMINE MESYLATE IJ PRN    docusate sodium (COLACE) 100 MG capsule DAILY    Dupixent 300 mg, EVERY 14 DAYS    EPINEPHrine (ADRENACLICK) 5.51 TM/5.84WP SOAJ injection IntraMUSCular, PRN    ESTRADIOL PO Oral, DAILY    hydrocortisone (CORTEF) 10 mg, Oral, DAILY    hydrocortisone (CORTEF) 5 mg, Oral, EVERY EVENING    Ibrexafungerp Citrate 150 MG TABS 2 tablets, EVERY 12 HOURS    itraconazole (SPORANOX) 10 MG/ML solution DAILY    ketoconazole (NIZORAL) 2 % shampoo DAILY    Lactobacillus (ACIDOPHILUS/BIFIDUS PO) 1 packet, Oral, 2 TIMES DAILY    linaclotide (LINZESS) 290 MCG CAPS capsule DAILY    magnesium oxide (MAG-OX) 400 MG tablet DAILY    Methylnaltrexone Bromide (RELISTOR) 150 MG TABS 2 times daily, 2 IN THE MORNING 1 IN PM    metoprolol succinate (TOPROL XL) 25 mg, Oral, DAILY    mometasone (ELOCON) 0.1 % lotion 2 times daily    mometasone-formoterol (DULERA) 200-5 MCG/ACT inhaler 2 times daily    montelukast (SINGULAIR) 10 mg, Oral, DAILY    morphine (MSIR) 15 mg, Oral, EVERY 8 HOURS PRN    naloxone 4 MG/0.1ML LIQD nasal spray PRN    nystatin-triamcinolone (MYCOLOG II) 170435-5.1 UNIT/GM-% cream 1 application, Topical, 2 TIMES DAILY    nystatin-triamcinolone (MYCOLOG II) 852974-2.1 UNIT/GM-% cream 2 times daily    omeprazole (PRILOSEC) 20 mg, Oral, 2 TIMES DAILY    ondansetron (ZOFRAN) 8 mg, Oral, 3 times daily, ODT oxyCODONE (OXYCONTIN) 30 mg, Oral, EVERY 12 HOURS    Pancrelipase, Lip-Prot-Amyl, (ZENPEP PO) Oral, 3 TIMES DAILY WITH MEALS    polyethylene glycol (MIRALAX) 17 GM/SCOOP powder DAILY    potassium chloride 20 MEQ/15ML (10%) oral solution 2 times daily    Potassium Citrate ER 15 MEQ (1620 MG) TBCR 15 mEq, Oral, 2 TIMES DAILY    predniSONE (DELTASONE) 20 MG tablet PRN    tiotropium (SPIRIVA RESPIMAT) 1.25 MCG/ACT AERS inhaler DAILY    triamcinolone (KENALOG) 0.1 % ointment PRN         Objective:   Patient Vitals for the past 24 hrs:   Temp Pulse Resp BP SpO2   02/06/23 1724 97.5 °F (36.4 °C) 88 18 110/80 95 %   02/06/23 1648 -- 85 16 118/72 92 %   02/06/23 1643 -- 87 17 115/70 91 %   02/06/23 1638 -- 90 17 120/73 93 %   02/06/23 1632 -- 91 17 110/72 (!) 89 %   02/06/23 1627 -- 90 16 120/77 (!) 89 %   02/06/23 1622 -- 90 16 122/78 93 %   02/06/23 1617 -- 91 16 118/80 93 %   02/06/23 1612 -- 84 17 118/75 94 %   02/06/23 1607 -- 87 17 116/74 94 %   02/06/23 1602 -- 88 16 121/72 93 %   02/06/23 1557 -- 86 -- 102/65 94 %   02/06/23 1551 97.1 °F (36.2 °C) 88 14 112/69 95 %   02/06/23 1159 98.3 °F (36.8 °C) 98 18 (!) 153/79 97 %       Oxygen Therapy  SpO2: 95 %  Pulse via Oximetry: 87 beats per minute  Pulse Oximeter Device Mode: Continuous  Pulse Oximeter Device Location: Right, Finger  O2 Device: None (Room air)  O2 Flow Rate (L/min): 6 L/min    Estimated body mass index is 25.75 kg/m² as calculated from the following:    Height as of this encounter: 5' 4\" (1.626 m). Weight as of this encounter: 150 lb (68 kg). Intake/Output Summary (Last 24 hours) at 2/6/2023 1748  Last data filed at 2/6/2023 1545  Gross per 24 hour   Intake 500 ml   Output 0 ml   Net 500 ml         Physical Exam:    Blood pressure 110/80, pulse 88, temperature 97.5 °F (36.4 °C), temperature source Oral, resp. rate 18, height 5' 4\" (1.626 m), weight 150 lb (68 kg), SpO2 95 %. General:    Well nourished.     Head:  Normocephalic, atraumatic  Eyes:  Post op medial rectus resection  ENT:  Nares appear normal.  Moist oral mucosa  Neck:  No restricted ROM. Trachea midline   CV:   RRR. No m/r/g. No jugular venous distension. Lungs:   CTAB. No wheezing, rhonchi, or rales. Symmetric expansion. Abdomen:   Soft, nontender, nondistended. Extremities: No cyanosis or clubbing. No edema  Skin:     No rashes and normal coloration. Warm and dry. Neuro:  CN II-XII grossly intact. Sensation intact. Psych:  Normal mood and affect. I have personally reviewed labs and tests:  Recent Labs:  Recent Results (from the past 24 hour(s))   Hemoglobin    Collection Time: 02/06/23 11:45 AM   Result Value Ref Range    Hemoglobin 12.3 11.7 - 15.4 g/dL   Basic Metabolic Panel    Collection Time: 02/06/23 11:45 AM   Result Value Ref Range    Sodium 136 133 - 143 mmol/L    Potassium 3.3 (L) 3.5 - 5.1 mmol/L    Chloride 101 101 - 110 mmol/L    CO2 28 21 - 32 mmol/L    Anion Gap 7 2 - 11 mmol/L    Glucose 98 65 - 100 mg/dL    BUN 12 6 - 23 MG/DL    Creatinine 1.10 (H) 0.6 - 1.0 MG/DL    Est, Glom Filt Rate >60 >60 ml/min/1.73m2    Calcium 8.9 8.3 - 10.4 MG/DL   POC Sodium and Potassium    Collection Time: 02/06/23 12:19 PM   Result Value Ref Range    POC Potassium 3.1 (L) 3.5 - 5.1 MMOL/L       I have personally reviewed imaging studies:  No results found. Echocardiogram:  No results found for this or any previous visit.         Orders Placed This Encounter   Medications    DISCONTD: HYDROmorphone HCl PF (DILAUDID) injection 0.25 mg    DISCONTD: HYDROmorphone HCl PF (DILAUDID) injection 0.5 mg    DISCONTD: oxyCODONE (ROXICODONE) immediate release tablet 5 mg    ondansetron (ZOFRAN) injection 4 mg    DISCONTD: labetalol (NORMODYNE;TRANDATE) injection 10 mg    DISCONTD: hydrALAZINE (APRESOLINE) injection 10 mg    DISCONTD: lidocaine 1 % injection 1 mL    DISCONTD: fentaNYL (SUBLIMAZE) injection 100 mcg    DISCONTD: lactated ringers IV soln infusion DISCONTD: sodium chloride flush 0.9 % injection 5-40 mL    DISCONTD: sodium chloride flush 0.9 % injection 5-40 mL    DISCONTD: 0.9 % sodium chloride infusion    proparacaine (ALCAINE) 0.5 % ophthalmic solution 1 drop    phenylephrine (MYDFRIN) 2.5 % ophthalmic solution 1 drop    neomycin-polymyxin-dexameth ophthalmic ointment    metoprolol succinate (TOPROL XL) extended release tablet 25 mg    DISCONTD: HYDROmorphone (DILAUDID) injection 0.5 mg    HYDROmorphone (DILAUDID) injection 0.5 mg    HYDROmorphone (DILAUDID) injection 0.5 mg    HYDROmorphone (DILAUDID) injection 0.5 mg    DISCONTD: phenylephrine (MYDFRIN) 2.5 % ophthalmic solution    DISCONTD: tetracaine (TETRAVISC) 0.5 % ophthalmic solution    allopurinol (ZYLOPRIM) tablet 300 mg    atorvastatin (LIPITOR) tablet 40 mg    Azelastine-Fluticasone 137-50 MCG/ACT SUSP 1 spray (Patient Supplied)    bumetanide (BUMEX) tablet 2 mg    DISCONTD: hydrocortisone (CORTEF) tablet 10 mg    DISCONTD: hydrocortisone (CORTEF) tablet 5 mg    Ibrexafungerp Citrate TABS 2 tablet    magnesium oxide (MAG-OX) tablet 200 mg    metoprolol succinate (TOPROL XL) extended release tablet 25 mg    mometasone-formoterol (DULERA) 200-5 MCG/ACT inhaler 1 puff    montelukast (SINGULAIR) tablet 10 mg    pantoprazole (PROTONIX) tablet 40 mg    tiotropium (SPIRIVA RESPIMAT) 1.25 MCG/ACT inhaler 2 puff    sodium chloride flush 0.9 % injection 5-40 mL    sodium chloride flush 0.9 % injection 5-40 mL    0.9 % sodium chloride infusion    OR Linked Order Group     ondansetron (ZOFRAN-ODT) disintegrating tablet 4 mg     ondansetron (ZOFRAN) injection 4 mg    polyethylene glycol (GLYCOLAX) packet 17 g    OR Linked Order Group     acetaminophen (TYLENOL) tablet 650 mg     acetaminophen (TYLENOL) suppository 650 mg    oxyCODONE (ROXICODONE) immediate release tablet 5 mg    0.9 % sodium chloride infusion    hydrocortisone (CORTEF) tablet 30 mg    hydrocortisone (CORTEF) tablet 15 mg Signed:  Sukhjinder Coles MD    Part of this note may have been written by using a voice dictation software. The note has been proof read but may still contain some grammatical/other typographical errors.

## 2023-02-07 PROBLEM — R10.9 CHRONIC ABDOMINAL PAIN: Status: ACTIVE | Noted: 2020-05-22

## 2023-02-07 PROBLEM — G89.29 CHRONIC ABDOMINAL PAIN: Status: ACTIVE | Noted: 2020-05-22

## 2023-02-07 LAB
ANION GAP SERPL CALC-SCNC: 4 MMOL/L (ref 2–11)
BASOPHILS # BLD: 0 K/UL (ref 0–0.2)
BASOPHILS NFR BLD: 0 % (ref 0–2)
BUN SERPL-MCNC: 8 MG/DL (ref 6–23)
CALCIUM SERPL-MCNC: 7.8 MG/DL (ref 8.3–10.4)
CHLORIDE SERPL-SCNC: 107 MMOL/L (ref 101–110)
CO2 SERPL-SCNC: 29 MMOL/L (ref 21–32)
CREAT SERPL-MCNC: 0.8 MG/DL (ref 0.6–1)
DIFFERENTIAL METHOD BLD: ABNORMAL
EOSINOPHIL # BLD: 0 K/UL (ref 0–0.8)
EOSINOPHIL NFR BLD: 0 % (ref 0.5–7.8)
ERYTHROCYTE [DISTWIDTH] IN BLOOD BY AUTOMATED COUNT: 14.2 % (ref 11.9–14.6)
GLUCOSE SERPL-MCNC: 121 MG/DL (ref 65–100)
HCT VFR BLD AUTO: 33.3 % (ref 35.8–46.3)
HGB BLD-MCNC: 10.2 G/DL (ref 11.7–15.4)
IMM GRANULOCYTES # BLD AUTO: 0.1 K/UL (ref 0–0.5)
IMM GRANULOCYTES NFR BLD AUTO: 1 % (ref 0–5)
LYMPHOCYTES # BLD: 1.8 K/UL (ref 0.5–4.6)
LYMPHOCYTES NFR BLD: 20 % (ref 13–44)
MAGNESIUM SERPL-MCNC: 1.6 MG/DL (ref 1.8–2.4)
MCH RBC QN AUTO: 30.5 PG (ref 26.1–32.9)
MCHC RBC AUTO-ENTMCNC: 30.6 G/DL (ref 31.4–35)
MCV RBC AUTO: 99.7 FL (ref 82–102)
MONOCYTES # BLD: 0.6 K/UL (ref 0.1–1.3)
MONOCYTES NFR BLD: 6 % (ref 4–12)
NEUTS SEG # BLD: 6.9 K/UL (ref 1.7–8.2)
NEUTS SEG NFR BLD: 73 % (ref 43–78)
NRBC # BLD: 0 K/UL (ref 0–0.2)
PLATELET # BLD AUTO: 214 K/UL (ref 150–450)
PMV BLD AUTO: 10.2 FL (ref 9.4–12.3)
POTASSIUM SERPL-SCNC: 2.7 MMOL/L (ref 3.5–5.1)
RBC # BLD AUTO: 3.34 M/UL (ref 4.05–5.2)
SODIUM SERPL-SCNC: 140 MMOL/L (ref 133–143)
WBC # BLD AUTO: 9.4 K/UL (ref 4.3–11.1)

## 2023-02-07 PROCEDURE — 83735 ASSAY OF MAGNESIUM: CPT

## 2023-02-07 PROCEDURE — 6360000002 HC RX W HCPCS: Performed by: STUDENT IN AN ORGANIZED HEALTH CARE EDUCATION/TRAINING PROGRAM

## 2023-02-07 PROCEDURE — G0378 HOSPITAL OBSERVATION PER HR: HCPCS

## 2023-02-07 PROCEDURE — 6370000000 HC RX 637 (ALT 250 FOR IP): Performed by: FAMILY MEDICINE

## 2023-02-07 PROCEDURE — 96366 THER/PROPH/DIAG IV INF ADDON: CPT

## 2023-02-07 PROCEDURE — 94760 N-INVAS EAR/PLS OXIMETRY 1: CPT

## 2023-02-07 PROCEDURE — 6370000000 HC RX 637 (ALT 250 FOR IP)

## 2023-02-07 PROCEDURE — 96375 TX/PRO/DX INJ NEW DRUG ADDON: CPT

## 2023-02-07 PROCEDURE — 94640 AIRWAY INHALATION TREATMENT: CPT

## 2023-02-07 PROCEDURE — 6370000000 HC RX 637 (ALT 250 FOR IP): Performed by: INTERNAL MEDICINE

## 2023-02-07 PROCEDURE — 6370000000 HC RX 637 (ALT 250 FOR IP): Performed by: STUDENT IN AN ORGANIZED HEALTH CARE EDUCATION/TRAINING PROGRAM

## 2023-02-07 PROCEDURE — 6360000002 HC RX W HCPCS: Performed by: INTERNAL MEDICINE

## 2023-02-07 PROCEDURE — 85025 COMPLETE CBC W/AUTO DIFF WBC: CPT

## 2023-02-07 PROCEDURE — 80048 BASIC METABOLIC PNL TOTAL CA: CPT

## 2023-02-07 PROCEDURE — 36415 COLL VENOUS BLD VENIPUNCTURE: CPT

## 2023-02-07 PROCEDURE — 96365 THER/PROPH/DIAG IV INF INIT: CPT

## 2023-02-07 PROCEDURE — 2580000003 HC RX 258: Performed by: FAMILY MEDICINE

## 2023-02-07 RX ORDER — MAGNESIUM SULFATE IN WATER 40 MG/ML
2000 INJECTION, SOLUTION INTRAVENOUS ONCE
Status: COMPLETED | OUTPATIENT
Start: 2023-02-07 | End: 2023-02-07

## 2023-02-07 RX ORDER — POTASSIUM CITRATE 15 MEQ/1
15 TABLET, EXTENDED RELEASE ORAL 2 TIMES DAILY
Status: DISCONTINUED | OUTPATIENT
Start: 2023-02-08 | End: 2023-02-08

## 2023-02-07 RX ORDER — POTASSIUM CHLORIDE 7.45 MG/ML
10 INJECTION INTRAVENOUS
Status: DISPENSED | OUTPATIENT
Start: 2023-02-07 | End: 2023-02-07

## 2023-02-07 RX ORDER — DOCUSATE SODIUM 100 MG/1
100 CAPSULE, LIQUID FILLED ORAL DAILY
Status: DISCONTINUED | OUTPATIENT
Start: 2023-02-07 | End: 2023-02-08 | Stop reason: HOSPADM

## 2023-02-07 RX ORDER — POTASSIUM CHLORIDE 20 MEQ/1
40 TABLET, EXTENDED RELEASE ORAL 2 TIMES DAILY WITH MEALS
Status: COMPLETED | OUTPATIENT
Start: 2023-02-07 | End: 2023-02-07

## 2023-02-07 RX ORDER — POTASSIUM CHLORIDE 20MEQ/15ML
20 LIQUID (ML) ORAL 2 TIMES DAILY
Status: DISCONTINUED | OUTPATIENT
Start: 2023-02-07 | End: 2023-02-07

## 2023-02-07 RX ORDER — POLYETHYLENE GLYCOL 3350 17 G/17G
17 POWDER, FOR SOLUTION ORAL DAILY
Status: DISCONTINUED | OUTPATIENT
Start: 2023-02-07 | End: 2023-02-08 | Stop reason: HOSPADM

## 2023-02-07 RX ORDER — ALBUTEROL SULFATE 90 UG/1
2 AEROSOL, METERED RESPIRATORY (INHALATION) EVERY 4 HOURS PRN
Status: DISCONTINUED | OUTPATIENT
Start: 2023-02-07 | End: 2023-02-08 | Stop reason: HOSPADM

## 2023-02-07 RX ORDER — POTASSIUM CITRATE 15 MEQ/1
15 TABLET, EXTENDED RELEASE ORAL 2 TIMES DAILY
Status: DISCONTINUED | OUTPATIENT
Start: 2023-02-07 | End: 2023-02-07

## 2023-02-07 RX ORDER — LANOLIN ALCOHOL/MO/W.PET/CERES
2500 CREAM (GRAM) TOPICAL DAILY
Status: DISCONTINUED | OUTPATIENT
Start: 2023-02-07 | End: 2023-02-08 | Stop reason: HOSPADM

## 2023-02-07 RX ADMIN — POTASSIUM CHLORIDE 10 MEQ: 7.46 INJECTION, SOLUTION INTRAVENOUS at 13:30

## 2023-02-07 RX ADMIN — MONTELUKAST 10 MG: 10 TABLET, FILM COATED ORAL at 08:39

## 2023-02-07 RX ADMIN — HYDROCORTISONE 30 MG: 5 TABLET ORAL at 17:17

## 2023-02-07 RX ADMIN — MORPHINE SULFATE 15 MG: 15 TABLET ORAL at 17:23

## 2023-02-07 RX ADMIN — SODIUM CHLORIDE, PRESERVATIVE FREE 10 ML: 5 INJECTION INTRAVENOUS at 08:41

## 2023-02-07 RX ADMIN — ALLOPURINOL 300 MG: 300 TABLET ORAL at 08:38

## 2023-02-07 RX ADMIN — ATORVASTATIN CALCIUM 40 MG: 40 TABLET, FILM COATED ORAL at 20:45

## 2023-02-07 RX ADMIN — NEOMYCIN SULFATE, POLYMYXIN B SULFATE AND DEXAMETHASONE 1 DROP: 3.5; 10000; 1 SUSPENSION OPHTHALMIC at 17:18

## 2023-02-07 RX ADMIN — TIOTROPIUM BROMIDE INHALATION SPRAY 2 PUFF: 3.12 SPRAY, METERED RESPIRATORY (INHALATION) at 09:03

## 2023-02-07 RX ADMIN — NEOMYCIN SULFATE, POLYMYXIN B SULFATE AND DEXAMETHASONE 1 DROP: 3.5; 10000; 1 SUSPENSION OPHTHALMIC at 12:50

## 2023-02-07 RX ADMIN — OXYCODONE HYDROCHLORIDE 30 MG: 15 TABLET, FILM COATED, EXTENDED RELEASE ORAL at 20:45

## 2023-02-07 RX ADMIN — OXYCODONE HYDROCHLORIDE 30 MG: 15 TABLET, FILM COATED, EXTENDED RELEASE ORAL at 09:36

## 2023-02-07 RX ADMIN — HYDROCORTISONE 15 MG: 5 TABLET ORAL at 17:26

## 2023-02-07 RX ADMIN — HYDROMORPHONE HYDROCHLORIDE 0.5 MG: 1 INJECTION, SOLUTION INTRAMUSCULAR; INTRAVENOUS; SUBCUTANEOUS at 10:51

## 2023-02-07 RX ADMIN — SODIUM CHLORIDE: 9 INJECTION, SOLUTION INTRAVENOUS at 09:40

## 2023-02-07 RX ADMIN — ONDANSETRON 8 MG: 2 INJECTION INTRAMUSCULAR; INTRAVENOUS at 00:43

## 2023-02-07 RX ADMIN — PANCRELIPASE LIPASE, PANCRELIPASE PROTEASE, PANCRELIPASE AMYLASE 40000 UNITS: 20000; 63000; 84000 CAPSULE, DELAYED RELEASE ORAL at 20:50

## 2023-02-07 RX ADMIN — ONDANSETRON 8 MG: 4 TABLET, ORALLY DISINTEGRATING ORAL at 17:17

## 2023-02-07 RX ADMIN — BUMETANIDE 2 MG: 1 TABLET ORAL at 20:44

## 2023-02-07 RX ADMIN — MORPHINE SULFATE 15 MG: 15 TABLET ORAL at 00:43

## 2023-02-07 RX ADMIN — DOCUSATE SODIUM 100 MG: 100 CAPSULE, LIQUID FILLED ORAL at 17:17

## 2023-02-07 RX ADMIN — HYDROMORPHONE HYDROCHLORIDE 0.5 MG: 1 INJECTION, SOLUTION INTRAMUSCULAR; INTRAVENOUS; SUBCUTANEOUS at 15:12

## 2023-02-07 RX ADMIN — HYDROCORTISONE 15 MG: 5 TABLET ORAL at 00:43

## 2023-02-07 RX ADMIN — MOMETASONE FUROATE AND FORMOTEROL FUMARATE DIHYDRATE 1 PUFF: 200; 5 AEROSOL RESPIRATORY (INHALATION) at 08:55

## 2023-02-07 RX ADMIN — HYDROMORPHONE HYDROCHLORIDE 0.5 MG: 1 INJECTION, SOLUTION INTRAMUSCULAR; INTRAVENOUS; SUBCUTANEOUS at 04:31

## 2023-02-07 RX ADMIN — POTASSIUM CHLORIDE 40 MEQ: 1500 TABLET, EXTENDED RELEASE ORAL at 17:16

## 2023-02-07 RX ADMIN — MAGNESIUM GLUCONATE 500 MG ORAL TABLET 200 MG: 500 TABLET ORAL at 08:39

## 2023-02-07 RX ADMIN — MAGNESIUM SULFATE HEPTAHYDRATE 2000 MG: 40 INJECTION, SOLUTION INTRAVENOUS at 09:36

## 2023-02-07 RX ADMIN — PANCRELIPASE LIPASE, PANCRELIPASE PROTEASE, PANCRELIPASE AMYLASE 30000 UNITS: 5000; 17000; 24000 CAPSULE, DELAYED RELEASE ORAL at 13:30

## 2023-02-07 RX ADMIN — BUMETANIDE 2 MG: 1 TABLET ORAL at 08:38

## 2023-02-07 RX ADMIN — POTASSIUM CHLORIDE 10 MEQ: 7.46 INJECTION, SOLUTION INTRAVENOUS at 12:00

## 2023-02-07 RX ADMIN — POTASSIUM CHLORIDE 10 MEQ: 7.46 INJECTION, SOLUTION INTRAVENOUS at 16:00

## 2023-02-07 RX ADMIN — MORPHINE SULFATE 15 MG: 15 TABLET ORAL at 08:38

## 2023-02-07 RX ADMIN — HYDROCORTISONE 30 MG: 5 TABLET ORAL at 10:46

## 2023-02-07 RX ADMIN — PANTOPRAZOLE SODIUM 40 MG: 40 TABLET, DELAYED RELEASE ORAL at 05:27

## 2023-02-07 RX ADMIN — POTASSIUM CHLORIDE 40 MEQ: 1500 TABLET, EXTENDED RELEASE ORAL at 09:36

## 2023-02-07 RX ADMIN — HYDROMORPHONE HYDROCHLORIDE 0.5 MG: 1 INJECTION, SOLUTION INTRAMUSCULAR; INTRAVENOUS; SUBCUTANEOUS at 20:43

## 2023-02-07 RX ADMIN — NEOMYCIN SULFATE, POLYMYXIN B SULFATE, AND DEXAMETHASONE: 3.5; 10000; 1 OINTMENT OPHTHALMIC at 20:46

## 2023-02-07 RX ADMIN — NEOMYCIN SULFATE, POLYMYXIN B SULFATE AND DEXAMETHASONE 1 DROP: 3.5; 10000; 1 SUSPENSION OPHTHALMIC at 05:27

## 2023-02-07 ASSESSMENT — PAIN - FUNCTIONAL ASSESSMENT
PAIN_FUNCTIONAL_ASSESSMENT: ACTIVITIES ARE NOT PREVENTED
PAIN_FUNCTIONAL_ASSESSMENT: ACTIVITIES ARE NOT PREVENTED

## 2023-02-07 ASSESSMENT — PAIN SCALES - GENERAL
PAINLEVEL_OUTOF10: 0
PAINLEVEL_OUTOF10: 8
PAINLEVEL_OUTOF10: 10
PAINLEVEL_OUTOF10: 9
PAINLEVEL_OUTOF10: 8
PAINLEVEL_OUTOF10: 2
PAINLEVEL_OUTOF10: 7
PAINLEVEL_OUTOF10: 2
PAINLEVEL_OUTOF10: 7

## 2023-02-07 ASSESSMENT — PAIN DESCRIPTION - LOCATION
LOCATION: EYE;GENERALIZED
LOCATION: GENERALIZED
LOCATION: GENERALIZED
LOCATION: EYE;GENERALIZED

## 2023-02-07 ASSESSMENT — PAIN DESCRIPTION - ONSET
ONSET: GRADUAL
ONSET: GRADUAL

## 2023-02-07 ASSESSMENT — PAIN DESCRIPTION - ORIENTATION
ORIENTATION: RIGHT;LEFT
ORIENTATION: RIGHT;LEFT

## 2023-02-07 ASSESSMENT — PAIN DESCRIPTION - FREQUENCY
FREQUENCY: INTERMITTENT
FREQUENCY: INTERMITTENT

## 2023-02-07 ASSESSMENT — PAIN DESCRIPTION - PAIN TYPE
TYPE: SURGICAL PAIN
TYPE: SURGICAL PAIN

## 2023-02-07 ASSESSMENT — PAIN DESCRIPTION - DESCRIPTORS
DESCRIPTORS: SHARP;STABBING
DESCRIPTORS: ACHING
DESCRIPTORS: ACHING

## 2023-02-07 NOTE — PROGRESS NOTES
Pt c/o IV potassium burning, pt still have one more bag to be infused. Nurse will hold that bag and administer her 40 oral meq. MD made aware.

## 2023-02-07 NOTE — CARE COORDINATION
Chart reviewed by  for potential discharge needs or concerns. None identified or reported at present. Please notify/consult  if discharge needs arise. 02/07/23 0465   Service Assessment   Patient Orientation Alert and Oriented   Cognition Alert   History Provided By Medical Record   Primary Caregiver Self   Support Systems Parent;Friends/Neighbors   PCP Verified by CM Yes   Prior Functional Level Independent in ADLs/IADLs   Current Functional Level Independent in ADLs/IADLs   Can patient return to prior living arrangement Yes   Ability to make needs known: Good   Family able to assist with home care needs: Yes   Would you like for me to discuss the discharge plan with any other family members/significant others, and if so, who? No   Financial Resources Medicaid   Social/Functional History   Lives With Alone   Type of Home House   ADL Assistance Independent   Homemaking Assistance Independent   Ambulation Assistance Independent   Transfer Assistance Independent   Occupation On disability   Discharge [address] Prior To Admission None   Potential Assistance Needed N/A   DME Ordered? No   Potential Assistance Purchasing Medications No   Type of Home Care Services None   Patient expects to be discharged to: [facility] Discharge   Transition of Care Consult (CM Consult) [address] Discharge None   [employer] Information Provided?  No   Mode of Transport at Discharge Other (see comment)  (family/friends)   Confirm Follow Up Transport Self   Condition of Participation: Discharge Planning   The Plan for Transition of Care is related to the following treatment goals: DC home with family/friend support to return to baseline functioning

## 2023-02-07 NOTE — PROGRESS NOTES
Pt not happy with dinner, states that her food is not what she ordered. Nurse educated pt on not using as much IV pain medication being that she will possibly go home in the am. Pt does not seem like she is in that much pain via naked eye. Pt currently laying in bed at this time.

## 2023-02-07 NOTE — PROGRESS NOTES
Pt states that the potassium is burning , IV team was up on the unit and attempted to start a 2nd IV for the potassium infusion. Pt refused the IV team at that time, stating that she had to use the rest room. The IV team explain to the pt that it would not take long and they would be in and out, Primary nurse explained to the pt that she needed the IV. Pt still did not want the IV at that time. Pt now asking for the IV team which is not available right now. This nurse ask the charge nurse to try and stick the pt, both attempts were unsuccessful being that pt is a difficult stick. Primary nurse explained to the pt that the IV team will be back once they have a chance, primary nurse also explained to the pt that the IV potassium would burn, pt verbalized understanding. Pt received oral potassium this  morning and complained that she needed IV potassium. IV potassium infusing now at a lower rate for comfort, will continue to monitor pt.

## 2023-02-07 NOTE — PLAN OF CARE
Problem: Pain  Goal: Verbalizes/displays adequate comfort level or baseline comfort level  2/6/2023 2033 by Carly Rouse RN  Outcome: Progressing  Flowsheets (Taken 2/6/2023 1935)  Verbalizes/displays adequate comfort level or baseline comfort level:   Encourage patient to monitor pain and request assistance   Assess pain using appropriate pain scale  2/6/2023 1735 by Flex Mccabe RN  Outcome: Progressing     Problem: Safety - Adult  Goal: Free from fall injury  2/6/2023 2033 by Carly Rouse RN  Outcome: Progressing  2/6/2023 1735 by Flex Mccabe RN  Outcome: Progressing     Problem: ABCDS Injury Assessment  Goal: Absence of physical injury  2/6/2023 2033 by Carly Rouse RN  Outcome: Progressing  Flowsheets (Taken 2/6/2023 1935)  Absence of Physical Injury: Implement safety measures based on patient assessment  2/6/2023 1735 by Flex Mccabe RN  Outcome: Progressing     Problem: Discharge Planning  Goal: Discharge to home or other facility with appropriate resources  2/6/2023 2033 by Carly Rouse RN  Outcome: Progressing  Flowsheets (Taken 2/6/2023 1935)  Discharge to home or other facility with appropriate resources: Identify barriers to discharge with patient and caregiver  2/6/2023 1735 by Flex Mccabe RN  Outcome: Progressing     Problem: Respiratory - Adult  Goal: Achieves optimal ventilation and oxygenation  Outcome: Progressing  Flowsheets (Taken 2/6/2023 1935)  Achieves optimal ventilation and oxygenation: Assess for changes in respiratory status     Problem: Cardiovascular - Adult  Goal: Maintains optimal cardiac output and hemodynamic stability  Outcome: Progressing  Flowsheets (Taken 2/6/2023 1935)  Maintains optimal cardiac output and hemodynamic stability: Monitor blood pressure and heart rate     Problem: Skin/Tissue Integrity - Adult  Goal: Skin integrity remains intact  Outcome: Progressing  Flowsheets (Taken 2/6/2023 1935)  Skin Integrity Remains Intact: Monitor for areas of redness and/or skin breakdown     Problem: Musculoskeletal - Adult  Goal: Return mobility to safest level of function  Outcome: Progressing  Flowsheets (Taken 2/6/2023 1935)  Return Mobility to Safest Level of Function: Assess patient stability and activity tolerance for standing, transferring and ambulating with or without assistive devices

## 2023-02-07 NOTE — OP NOTE
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 Next, the patient was brought back to the operating room and placed into the supine position. General LMA anesthesia was administered. The patient was then prepped and draped in sterile ophthalmic fashion. Attention was first turned to the right eye. An eyelid speculum was placed into the right eye and the eye was rotated superotemporally. Next, an inferomedial fornix incision was made in the conjunctiva and the right medial rectus muscle was hooked with a small Juan's muscle hooked followed by 2 subsequent large Green muscle hooks. Next, the intramuscluar attachments were severed. Next a large hooked placed posterior to the anterior muscle hook and a 6-0 vicryl suture was placed 6.5 mm posterior to the original muscle insertion with double-locking bites at either end. Next, a muscle clamp was placed across the muscle just anterior to the posterior muscle hook. Then, the muscle was cut with Joe scissors at the clamp and low temp cautery was used for hemostasis. The muscle stump at the insertion was then trimmed free. Next, each needle was passed through the original muscle insertion and securely tied into place, creating a 6.5 mm resection.  The conjunctiva was closed with interrupted 6-0 plain gut suture. The eyelid speculum was removed from the right eye and placed into the left eye. The same procedure was conducted on the left eye, creating a 6.5 mm resection of the left medial rectus muscle. The patient tolerated the procedure without any complications. The patient was awaken, extubated, and taken to the recovery room. She was then admitted overnight for observation with the hospitalist service. The patient will follow-up in 1 month for a post-op check.

## 2023-02-07 NOTE — PROGRESS NOTES
Hospitalist Progress Note   Admit Date:  2023 11:25 AM   Name:  Kerrie Spaulding   Age:  36 y.o. Sex:  female  :  1980   MRN:  [id number]   Room:  2/    Presenting Complaint: No chief complaint on file. Reason(s) for Admission: Intermittent alternating exotropia [H50.34]  Post-op pain [G89.18]  [facility] disease ([facility] [E27.1]     Hospital Course:   Kerrie Spaulding is a 36 y.o. female with medical history of Ardenvoir's disease, chronic pancreatitis, chronic pain syndrome, being admitted as a direct admission for postop observation. Patient is postop bilateral medial rectus resection for exotropia by ophthalmologist.  Ophthalmology asked us to admit for overnight observation due to history of Eugene's disease and requiring 24-hour observation. Patient is seen at the bedside. Complaining of postop pain. Denies chest pain, palpitation, nausea, vomiting or abdominal pain. Patient lives at home on hydrocortisone 10 mg a.m. and 5 mg p.m. Maile Murrieta Patient reports she required triple dose postoperatively in the past.  No other concern at this time      Subjective & 24hr Events (23): Reports mild nausea, no vomiting. Not eating much. Has dry eyes after surgery. Says she is taking her eye drops/ointment.        Assessment & Plan:     # Exotropia status post bilateral medial rectus resection on :  Continue postop care  Maxitrol drops 4 times daily and Maxitrol ointment at bedtime in both eyes     # Ardenvoir's disease:  Continue increased dose of hydrocortisone 30mg qam, 15mg qhs    # hypokalemia  Replete, repeat this evening after infusion    # hypomag  Replete, repeat this evening after infusion     # Asthma:  Continue home inhalers  Stable     # Hypertension:  Continue metoprolol     # GERD:  Continue PPI    # chronic pain  Continue home medications (oxycontin ER 30mg q12)    # chronic pancreatitis  - resume zenpep         Anticipated discharge needs:      Pending, discharge likely in AM    Diet:  ADULT DIET; Regular  DVT PPx: scd  Code status: Full Code    Hospital Problems:  Principal Problem:    Person disease ([address]  Active Problems:    Post-op pain  Resolved Problems:    * No resolved hospital problems. *      Objective:   Patient Vitals for the past 24 hrs:   Temp Pulse Resp BP SpO2   02/07/23 1051 98.8 °F (37.1 °C) 91 18 127/82 100 %   02/07/23 0710 99 °F (37.2 °C) 82 18 102/67 100 %   02/07/23 0316 98.2 °F (36.8 °C) 90 18 110/74 96 %   02/07/23 0013 97.9 °F (36.6 °C) 93 18 107/67 97 %   02/06/23 1945 97.9 °F (36.6 °C) 90 18 115/71 94 %   02/06/23 1724 97.5 °F (36.4 °C) 88 18 110/80 95 %   02/06/23 1648 -- 85 16 118/72 92 %   02/06/23 1643 -- 87 17 115/70 91 %   02/06/23 1638 -- 90 17 120/73 93 %   02/06/23 1632 -- 91 17 110/72 (!) 89 %   02/06/23 1627 -- 90 16 120/77 (!) 89 %   02/06/23 1622 -- 90 16 122/78 93 %   02/06/23 1617 -- 91 16 118/80 93 %   02/06/23 1612 -- 84 17 118/75 94 %   02/06/23 1607 -- 87 17 116/74 94 %   02/06/23 1602 -- 88 16 121/72 93 %   02/06/23 1557 -- 86 -- 102/65 94 %   02/06/23 1551 97.1 °F (36.2 °C) 88 14 112/69 95 %       Oxygen Therapy  SpO2: 100 %  Pulse via Oximetry: 87 beats per minute  Pulse Oximeter Device Mode: Continuous  Pulse Oximeter Device Location: Right, Finger  O2 Device: None (Room air)  O2 Flow Rate (L/min): 6 L/min    Estimated body mass index is 27.81 kg/m² as calculated from the following:    Height as of this encounter: 5' 4\" (1.626 m). Weight as of this encounter: 162 lb (73.5 kg). Intake/Output Summary (Last 24 hours) at 2/7/2023 1446  Last data filed at 2/7/2023 1443  Gross per 24 hour   Intake 980 ml   Output 0 ml   Net 980 ml         Physical Exam:     Blood pressure 127/82, pulse 91, temperature 98.8 °F (37.1 °C), temperature source Oral, resp. rate 18, height 5' 4\" (1.626 m), weight 162 lb (73.5 kg), SpO2 100 %. General:    Well nourished.     Head:  Normocephalic, atraumatic  Eyes:  Post op medial rectus resection  ENT:  Nares appear normal.  Moist oral mucosa  Neck:  No restricted ROM. Trachea midline   CV:   RRR. No m/r/g. No jugular venous distension. Lungs:   CTAB. No wheezing, rhonchi, or rales. Symmetric expansion. Abdomen:   Soft, nontender, nondistended. Extremities: No cyanosis or clubbing. No edema  Skin:     No rashes and normal coloration. Warm and dry. Neuro:  CN II-XII grossly intact. Psych:  Normal mood and affect.       I have personally reviewed labs and tests:  Recent Labs:  Recent Results (from the past 48 hour(s))   Hemoglobin    Collection Time: 02/06/23 11:45 AM   Result Value Ref Range    Hemoglobin 12.3 11.7 - 15.4 g/dL   Basic Metabolic Panel    Collection Time: 02/06/23 11:45 AM   Result Value Ref Range    Sodium 136 133 - 143 mmol/L    Potassium 3.3 (L) 3.5 - 5.1 mmol/L    Chloride 101 101 - 110 mmol/L    CO2 28 21 - 32 mmol/L    Anion Gap 7 2 - 11 mmol/L    Glucose 98 65 - 100 mg/dL    BUN 12 6 - 23 MG/DL    Creatinine 1.10 (H) 0.6 - 1.0 MG/DL    Est, Glom Filt Rate >60 >60 ml/min/1.73m2    Calcium 8.9 8.3 - 10.4 MG/DL   POC Sodium and Potassium    Collection Time: 02/06/23 12:19 PM   Result Value Ref Range    POC Potassium 3.1 (L) 3.5 - 5.1 MMOL/L   Basic Metabolic Panel w/ Reflex to MG    Collection Time: 02/07/23  5:00 AM   Result Value Ref Range    Sodium 140 133 - 143 mmol/L    Potassium 2.7 (L) 3.5 - 5.1 mmol/L    Chloride 107 101 - 110 mmol/L    CO2 29 21 - 32 mmol/L    Anion Gap 4 2 - 11 mmol/L    Glucose 121 (H) 65 - 100 mg/dL    BUN 8 6 - 23 MG/DL    Creatinine 0.80 0.6 - 1.0 MG/DL    Est, Glom Filt Rate >60 >60 ml/min/1.73m2    Calcium 7.8 (L) 8.3 - 10.4 MG/DL   CBC with Auto Differential    Collection Time: 02/07/23  5:00 AM   Result Value Ref Range    WBC 9.4 4.3 - 11.1 K/uL    RBC 3.34 (L) 4.05 - 5.2 M/uL    Hemoglobin 10.2 (L) 11.7 - 15.4 g/dL    Hematocrit 33.3 (L) 35.8 - 46.3 %    MCV 99.7 82 - 102 FL    MCH 30.5 26.1 - 32.9 PG    MCHC 30.6 (L) 31.4 - 35.0 g/dL    RDW 14.2 11.9 - 14.6 %    Platelets 302 924 - 096 K/uL    MPV 10.2 9.4 - 12.3 FL    nRBC 0.00 0.0 - 0.2 K/uL    Differential Type AUTOMATED      Seg Neutrophils 73 43 - 78 %    Lymphocytes 20 13 - 44 %    Monocytes 6 4.0 - 12.0 %    Eosinophils % 0 (L) 0.5 - 7.8 %    Basophils 0 0.0 - 2.0 %    Immature Granulocytes 1 0.0 - 5.0 %    Segs Absolute 6.9 1.7 - 8.2 K/UL    Absolute Lymph # 1.8 0.5 - 4.6 K/UL    Absolute Mono # 0.6 0.1 - 1.3 K/UL    Absolute Eos # 0.0 0.0 - 0.8 K/UL    Basophils Absolute 0.0 0.0 - 0.2 K/UL    Absolute Immature Granulocyte 0.1 0.0 - 0.5 K/UL   Magnesium    Collection Time: 02/07/23  5:00 AM   Result Value Ref Range    Magnesium 1.6 (L) 1.8 - 2.4 mg/dL       I have personally reviewed imaging studies:  Other Studies:  No orders to display       Current Meds:  Current Facility-Administered Medications   Medication Dose Route Frequency    potassium chloride (KLOR-CON M) extended release tablet 40 mEq  40 mEq Oral BID WC    potassium chloride 10 mEq/100 mL IVPB (Peripheral Line)  10 mEq IntraVENous Q2H    lipase-protease-amylase (ZENPEP) delayed release capsule 30,000 Units  30,000 Units Oral TID WC    albuterol sulfate HFA (PROVENTIL;VENTOLIN;PROAIR) 108 (90 Base) MCG/ACT inhaler 2 puff  2 puff Inhalation Q4H PRN    vitamin B-12 (CYANOCOBALAMIN) tablet 2,500 mcg  2,500 mcg Oral Daily    docusate sodium (COLACE) capsule 100 mg  100 mg Oral Daily    [START ON 2/8/2023] linaclotide (LINZESS) capsule 290 mcg (Patient Supplied)  290 mcg Oral QAM AC    [START ON 2/8/2023] naloxegol (MOVANTIK) tablet 25 mg  25 mg Oral QAM    polyethylene glycol (GLYCOLAX) packet 17 g  17 g Oral Daily    [START ON 2/8/2023] potassium chloride 20 MEQ/15ML (10%) oral solution 20 mEq  20 mEq Oral BID    [START ON 2/8/2023] Potassium Citrate ER (UROCIT-K) extended release tablet 15 mEq  15 mEq Oral BID    proparacaine (ALCAINE) 0.5 % ophthalmic solution 1 drop  1 drop Both Eyes Once    phenylephrine (MYDFRIN) 2.5 % ophthalmic solution 1 drop  1 drop Both Eyes Once    neomycin-polymyxin-dexameth ophthalmic ointment   Both Eyes Once    allopurinol (ZYLOPRIM) tablet 300 mg  300 mg Oral Daily    atorvastatin (LIPITOR) tablet 40 mg  40 mg Oral Nightly    Azelastine-Fluticasone 137-50 MCG/ACT SUSP 1 spray (Patient Supplied)  1 spray Oral Daily    bumetanide (BUMEX) tablet 2 mg  2 mg Oral BID    metoprolol succinate (TOPROL XL) extended release tablet 25 mg  25 mg Oral Daily    mometasone-formoterol (DULERA) 200-5 MCG/ACT inhaler 1 puff  1 puff Inhalation BID    montelukast (SINGULAIR) tablet 10 mg  10 mg Oral Daily    pantoprazole (PROTONIX) tablet 40 mg  40 mg Oral QAM AC    tiotropium (SPIRIVA RESPIMAT) 2.5 MCG/ACT inhaler 2 puff  2 puff Inhalation Daily    sodium chloride flush 0.9 % injection 5-40 mL  5-40 mL IntraVENous 2 times per day    sodium chloride flush 0.9 % injection 5-40 mL  5-40 mL IntraVENous PRN    0.9 % sodium chloride infusion   IntraVENous PRN    polyethylene glycol (GLYCOLAX) packet 17 g  17 g Oral Daily PRN    acetaminophen (TYLENOL) tablet 650 mg  650 mg Oral Q6H PRN    Or    acetaminophen (TYLENOL) suppository 650 mg  650 mg Rectal Q6H PRN    0.9 % sodium chloride infusion   IntraVENous Continuous    hydrocortisone (CORTEF) tablet 30 mg  30 mg Oral Daily    hydrocortisone (CORTEF) tablet 15 mg  15 mg Oral QPM    magnesium oxide (MAG-OX) tablet 200 mg  200 mg Oral Daily    oxyCODONE (OXYCONTIN) extended release tablet 30 mg  30 mg Oral Q12H    morphine (MSIR) tablet 15 mg  15 mg Oral Q8H PRN    HYDROmorphone (DILAUDID) injection 0.5 mg  0.5 mg IntraVENous Q4H PRN    neomycin-polymyxin-dexameth (MAXITROL) 3.5-43124-4.1 ophthalmic suspension 1 drop  1 drop Both Eyes 4 times per day    neomycin-polymyxin-dexameth ophthalmic ointment   Both Eyes QHS    ondansetron (ZOFRAN-ODT) disintegrating tablet 8 mg  8 mg Oral Q8H PRN    Or    ondansetron (ZOFRAN) injection 8 mg  8 mg IntraVENous Q6H PRN Signed:  Lis Cerna DO    Part of this note may have been written by using a voice dictation software. The note has been proof read but may still contain some grammatical/other typographical errors.

## 2023-02-08 VITALS
OXYGEN SATURATION: 99 % | SYSTOLIC BLOOD PRESSURE: 106 MMHG | HEIGHT: 64 IN | TEMPERATURE: 99.1 F | WEIGHT: 162 LBS | HEART RATE: 90 BPM | RESPIRATION RATE: 20 BRPM | DIASTOLIC BLOOD PRESSURE: 77 MMHG | BODY MASS INDEX: 27.66 KG/M2

## 2023-02-08 LAB
ANION GAP SERPL CALC-SCNC: 3 MMOL/L (ref 2–11)
ANION GAP SERPL CALC-SCNC: 6 MMOL/L (ref 2–11)
BUN SERPL-MCNC: 7 MG/DL (ref 6–23)
BUN SERPL-MCNC: 8 MG/DL (ref 6–23)
CALCIUM SERPL-MCNC: 7.2 MG/DL (ref 8.3–10.4)
CALCIUM SERPL-MCNC: 7.3 MG/DL (ref 8.3–10.4)
CHLORIDE SERPL-SCNC: 110 MMOL/L (ref 101–110)
CHLORIDE SERPL-SCNC: 112 MMOL/L (ref 101–110)
CO2 SERPL-SCNC: 23 MMOL/L (ref 21–32)
CO2 SERPL-SCNC: 29 MMOL/L (ref 21–32)
CREAT SERPL-MCNC: 0.8 MG/DL (ref 0.6–1)
CREAT SERPL-MCNC: 0.8 MG/DL (ref 0.6–1)
GLUCOSE SERPL-MCNC: 159 MG/DL (ref 65–100)
GLUCOSE SERPL-MCNC: 160 MG/DL (ref 65–100)
MAGNESIUM SERPL-MCNC: 1.4 MG/DL (ref 1.8–2.4)
POTASSIUM SERPL-SCNC: 3 MMOL/L (ref 3.5–5.1)
POTASSIUM SERPL-SCNC: 3.6 MMOL/L (ref 3.5–5.1)
SODIUM SERPL-SCNC: 141 MMOL/L (ref 133–143)
SODIUM SERPL-SCNC: 142 MMOL/L (ref 133–143)

## 2023-02-08 PROCEDURE — 96366 THER/PROPH/DIAG IV INF ADDON: CPT

## 2023-02-08 PROCEDURE — 80048 BASIC METABOLIC PNL TOTAL CA: CPT

## 2023-02-08 PROCEDURE — G0378 HOSPITAL OBSERVATION PER HR: HCPCS

## 2023-02-08 PROCEDURE — 6370000000 HC RX 637 (ALT 250 FOR IP): Performed by: FAMILY MEDICINE

## 2023-02-08 PROCEDURE — 36415 COLL VENOUS BLD VENIPUNCTURE: CPT

## 2023-02-08 PROCEDURE — 96368 THER/DIAG CONCURRENT INF: CPT

## 2023-02-08 PROCEDURE — 6370000000 HC RX 637 (ALT 250 FOR IP)

## 2023-02-08 PROCEDURE — 6370000000 HC RX 637 (ALT 250 FOR IP): Performed by: INTERNAL MEDICINE

## 2023-02-08 PROCEDURE — 83735 ASSAY OF MAGNESIUM: CPT

## 2023-02-08 PROCEDURE — 94640 AIRWAY INHALATION TREATMENT: CPT

## 2023-02-08 PROCEDURE — 6360000002 HC RX W HCPCS: Performed by: INTERNAL MEDICINE

## 2023-02-08 PROCEDURE — 2580000003 HC RX 258: Performed by: FAMILY MEDICINE

## 2023-02-08 RX ORDER — MAGNESIUM SULFATE IN WATER 40 MG/ML
2000 INJECTION, SOLUTION INTRAVENOUS ONCE
Status: COMPLETED | OUTPATIENT
Start: 2023-02-08 | End: 2023-02-08

## 2023-02-08 RX ORDER — NEOMYCIN SULFATE, POLYMYXIN B SULFATE AND DEXAMETHASONE 3.5; 10000; 1 MG/ML; [USP'U]/ML; MG/ML
1 SUSPENSION/ DROPS OPHTHALMIC EVERY 6 HOURS
Qty: 5 ML | Refills: 0 | Status: SHIPPED | OUTPATIENT
Start: 2023-02-08 | End: 2023-02-18

## 2023-02-08 RX ORDER — POTASSIUM CHLORIDE 750 MG/1
15 TABLET, EXTENDED RELEASE ORAL 2 TIMES DAILY WITH MEALS
Status: DISCONTINUED | OUTPATIENT
Start: 2023-02-08 | End: 2023-02-08 | Stop reason: HOSPADM

## 2023-02-08 RX ORDER — MORPHINE SULFATE 15 MG/1
15 TABLET ORAL EVERY 6 HOURS PRN
Status: DISCONTINUED | OUTPATIENT
Start: 2023-02-08 | End: 2023-02-08 | Stop reason: HOSPADM

## 2023-02-08 RX ORDER — NEOMYCIN SULFATE, POLYMYXIN B SULFATE, AND DEXAMETHASONE 3.5; 10000; 1 MG/G; [USP'U]/G; MG/G
OINTMENT OPHTHALMIC
Qty: 3.5 G | Refills: 0 | Status: SHIPPED | OUTPATIENT
Start: 2023-02-08

## 2023-02-08 RX ORDER — POTASSIUM CITRATE 15 MEQ/1
15 TABLET, EXTENDED RELEASE ORAL 2 TIMES DAILY
Status: DISCONTINUED | OUTPATIENT
Start: 2023-02-08 | End: 2023-02-08 | Stop reason: CLARIF

## 2023-02-08 RX ORDER — MORPHINE SULFATE 15 MG/1
15 TABLET ORAL EVERY 6 HOURS PRN
Status: DISCONTINUED | OUTPATIENT
Start: 2023-02-08 | End: 2023-02-08

## 2023-02-08 RX ADMIN — NEOMYCIN SULFATE, POLYMYXIN B SULFATE AND DEXAMETHASONE 1 DROP: 3.5; 10000; 1 SUSPENSION OPHTHALMIC at 12:30

## 2023-02-08 RX ADMIN — PANCRELIPASE LIPASE, PANCRELIPASE PROTEASE, PANCRELIPASE AMYLASE 30000 UNITS: 5000; 17000; 24000 CAPSULE, DELAYED RELEASE ORAL at 10:24

## 2023-02-08 RX ADMIN — SODIUM CHLORIDE, PRESERVATIVE FREE 10 ML: 5 INJECTION INTRAVENOUS at 09:08

## 2023-02-08 RX ADMIN — BUMETANIDE 2 MG: 1 TABLET ORAL at 09:06

## 2023-02-08 RX ADMIN — POLYETHYLENE GLYCOL 3350 17 G: 17 POWDER, FOR SOLUTION ORAL at 09:09

## 2023-02-08 RX ADMIN — HYDROCORTISONE 30 MG: 5 TABLET ORAL at 09:05

## 2023-02-08 RX ADMIN — SODIUM CHLORIDE: 9 INJECTION, SOLUTION INTRAVENOUS at 06:48

## 2023-02-08 RX ADMIN — METOPROLOL SUCCINATE 25 MG: 25 TABLET, FILM COATED, EXTENDED RELEASE ORAL at 09:04

## 2023-02-08 RX ADMIN — MAGNESIUM GLUCONATE 500 MG ORAL TABLET 200 MG: 500 TABLET ORAL at 09:05

## 2023-02-08 RX ADMIN — MOMETASONE FUROATE AND FORMOTEROL FUMARATE DIHYDRATE 1 PUFF: 200; 5 AEROSOL RESPIRATORY (INHALATION) at 08:29

## 2023-02-08 RX ADMIN — NALOXEGOL OXALATE 25 MG: 25 TABLET, FILM COATED ORAL at 09:05

## 2023-02-08 RX ADMIN — MAGNESIUM SULFATE HEPTAHYDRATE 2000 MG: 40 INJECTION, SOLUTION INTRAVENOUS at 10:23

## 2023-02-08 RX ADMIN — TIOTROPIUM BROMIDE INHALATION SPRAY 2 PUFF: 3.12 SPRAY, METERED RESPIRATORY (INHALATION) at 08:29

## 2023-02-08 RX ADMIN — POTASSIUM BICARBONATE 40 MEQ: 782 TABLET, EFFERVESCENT ORAL at 09:06

## 2023-02-08 RX ADMIN — OXYCODONE HYDROCHLORIDE 30 MG: 15 TABLET, FILM COATED, EXTENDED RELEASE ORAL at 10:23

## 2023-02-08 RX ADMIN — CYANOCOBALAMIN TAB 1000 MCG 2500 MCG: 1000 TAB at 09:03

## 2023-02-08 RX ADMIN — NEOMYCIN SULFATE, POLYMYXIN B SULFATE AND DEXAMETHASONE 1 DROP: 3.5; 10000; 1 SUSPENSION OPHTHALMIC at 00:10

## 2023-02-08 RX ADMIN — MORPHINE SULFATE 15 MG: 15 TABLET ORAL at 12:35

## 2023-02-08 RX ADMIN — ALLOPURINOL 300 MG: 300 TABLET ORAL at 09:04

## 2023-02-08 RX ADMIN — MONTELUKAST 10 MG: 10 TABLET, FILM COATED ORAL at 09:04

## 2023-02-08 RX ADMIN — POTASSIUM CHLORIDE 15 MEQ: 750 TABLET, EXTENDED RELEASE ORAL at 09:27

## 2023-02-08 RX ADMIN — MORPHINE SULFATE 15 MG: 15 TABLET ORAL at 06:47

## 2023-02-08 RX ADMIN — NEOMYCIN SULFATE, POLYMYXIN B SULFATE AND DEXAMETHASONE 1 DROP: 3.5; 10000; 1 SUSPENSION OPHTHALMIC at 06:51

## 2023-02-08 RX ADMIN — DOCUSATE SODIUM 100 MG: 100 CAPSULE, LIQUID FILLED ORAL at 09:04

## 2023-02-08 RX ADMIN — PANTOPRAZOLE SODIUM 40 MG: 40 TABLET, DELAYED RELEASE ORAL at 06:47

## 2023-02-08 RX ADMIN — MORPHINE SULFATE 15 MG: 15 TABLET ORAL at 01:07

## 2023-02-08 ASSESSMENT — PAIN DESCRIPTION - DESCRIPTORS
DESCRIPTORS: SHARP;STABBING
DESCRIPTORS: STABBING;SHARP
DESCRIPTORS: ACHING;SHARP
DESCRIPTORS: ACHING;SHARP

## 2023-02-08 ASSESSMENT — PAIN SCALES - GENERAL
PAINLEVEL_OUTOF10: 10
PAINLEVEL_OUTOF10: 10
PAINLEVEL_OUTOF10: 7
PAINLEVEL_OUTOF10: 10
PAINLEVEL_OUTOF10: 8

## 2023-02-08 NOTE — PLAN OF CARE
Problem: Pain  Goal: Verbalizes/displays adequate comfort level or baseline comfort level  Outcome: Progressing  Flowsheets (Taken 2/7/2023 1952)  Verbalizes/displays adequate comfort level or baseline comfort level:   Encourage patient to monitor pain and request assistance   Assess pain using appropriate pain scale   Administer analgesics based on type and severity of pain and evaluate response   Implement non-pharmacological measures as appropriate and evaluate response     Problem: Safety - Adult  Goal: Free from fall injury  Outcome: Progressing     Problem: ABCDS Injury Assessment  Goal: Absence of physical injury  Outcome: Progressing     Problem: Discharge Planning  Goal: Discharge to home or other facility with appropriate resources  Outcome: Progressing  Flowsheets (Taken 2/7/2023 1952)  Discharge to home or other facility with appropriate resources: Identify barriers to discharge with patient and caregiver     Problem: Respiratory - Adult  Goal: Achieves optimal ventilation and oxygenation  Outcome: Progressing     Problem: Cardiovascular - Adult  Goal: Maintains optimal cardiac output and hemodynamic stability  Outcome: Progressing     Problem: Skin/Tissue Integrity - Adult  Goal: Skin integrity remains intact  Outcome: Progressing  Flowsheets (Taken 2/7/2023 1952)  Skin Integrity Remains Intact: Monitor for areas of redness and/or skin breakdown     Problem: Musculoskeletal - Adult  Goal: Return mobility to safest level of function  Outcome: Progressing  Flowsheets (Taken 2/7/2023 1952)  Return Mobility to Safest Level of Function: Assess patient stability and activity tolerance for standing, transferring and ambulating with or without assistive devices

## 2023-02-08 NOTE — PROGRESS NOTES
Discharge instructions reviewed with Pt. Opportunity for questions and clarification given. IV removed and cath tip intact. Scripts sent to pharmacy. Education given to pt and pt was able to teach back. No needs at this time and pt waiting on ride to arrive.

## 2023-02-08 NOTE — PLAN OF CARE
Problem: Pain  Goal: Verbalizes/displays adequate comfort level or baseline comfort level  2/8/2023 1253 by Sharath Cummings RN  Outcome: Adequate for Discharge  2/8/2023 1253 by Sharath Cummings RN  Outcome: Progressing     Problem: Safety - Adult  Goal: Free from fall injury  2/8/2023 1253 by Sharath Cummings RN  Outcome: Adequate for Discharge  2/8/2023 1253 by Sharath Cummings RN  Outcome: Progressing     Problem: ABCDS Injury Assessment  Goal: Absence of physical injury  2/8/2023 1253 by Sharath Cummings RN  Outcome: Adequate for Discharge  2/8/2023 1253 by Sharath Cummings RN  Outcome: Progressing     Problem: Discharge Planning  Goal: Discharge to home or other facility with appropriate resources  2/8/2023 1253 by Sharath Cummings RN  Outcome: Adequate for Discharge  2/8/2023 1253 by Sharath Cummings RN  Outcome: Progressing     Problem: Respiratory - Adult  Goal: Achieves optimal ventilation and oxygenation  2/8/2023 1253 by Sharath Cummings RN  Outcome: Adequate for Discharge  2/8/2023 1253 by Sharath Cummings RN  Outcome: Progressing     Problem: Cardiovascular - Adult  Goal: Maintains optimal cardiac output and hemodynamic stability  2/8/2023 1253 by Sharath Cummings RN  Outcome: Adequate for Discharge  2/8/2023 1253 by Sharath Cummings RN  Outcome: Progressing     Problem: Skin/Tissue Integrity - Adult  Goal: Skin integrity remains intact  2/8/2023 1253 by Sharath Cummings RN  Outcome: Adequate for Discharge  2/8/2023 1253 by Sharath Cummings RN  Outcome: Progressing     Problem: Musculoskeletal - Adult  Goal: Return mobility to safest level of function  2/8/2023 1253 by Sharath Cummings RN  Outcome: Adequate for Discharge  2/8/2023 1253 by Sharath Cummings RN  Outcome: Progressing

## 2023-02-08 NOTE — PLAN OF CARE
Problem: Pain  Goal: Verbalizes/displays adequate comfort level or baseline comfort level  2/8/2023 1253 by Lexi Guzmán RN  Outcome: Adequate for Discharge  2/8/2023 1253 by Lexi Guzmán RN  Outcome: Progressing     Problem: Safety - Adult  Goal: Free from fall injury  2/8/2023 1253 by Lexi Guzmán RN  Outcome: Adequate for Discharge  2/8/2023 1253 by Lexi Guzmán RN  Outcome: Progressing     Problem: ABCDS Injury Assessment  Goal: Absence of physical injury  2/8/2023 1253 by Lexi Guzmán RN  Outcome: Adequate for Discharge  2/8/2023 1253 by Lexi Guzmán RN  Outcome: Progressing     Problem: Discharge Planning  Goal: Discharge to home or other facility with appropriate resources  2/8/2023 1253 by Lexi Guzmán RN  Outcome: Adequate for Discharge  2/8/2023 1253 by Lexi Guzmán RN  Outcome: Progressing     Problem: Respiratory - Adult  Goal: Achieves optimal ventilation and oxygenation  2/8/2023 1253 by Lexi Guzmán RN  Outcome: Adequate for Discharge  2/8/2023 1253 by Lexi Guzmán RN  Outcome: Progressing     Problem: Cardiovascular - Adult  Goal: Maintains optimal cardiac output and hemodynamic stability  2/8/2023 1253 by Lexi Guzmán RN  Outcome: Adequate for Discharge  2/8/2023 1253 by Lexi Guzmán RN  Outcome: Progressing     Problem: Skin/Tissue Integrity - Adult  Goal: Skin integrity remains intact  2/8/2023 1253 by Lexi Guzmán RN  Outcome: Adequate for Discharge  2/8/2023 1253 by Lexi Guzmán RN  Outcome: Progressing     Problem: Musculoskeletal - Adult  Goal: Return mobility to safest level of function  2/8/2023 1253 by Lexi Guzmán RN  Outcome: Adequate for Discharge  2/8/2023 1253 by Lexi Guzmán RN  Outcome: Progressing

## 2023-02-08 NOTE — PROGRESS NOTES
[facility] [address]  [place] [address]  Phone: [phone]             February 8, 2023    Patient: Caitlyn Vaca   YOB: 1980   Date of Visit: 2/6/2023       To Whom It May Concern:    Kush Lee was seen and treated in our facility  beginning 2/6/2023 until 2/8/2023.        Sincerely,       Flora Gamble RN

## 2023-02-08 NOTE — DISCHARGE SUMMARY
Hospitalist Discharge Summary   Admit Date:  2023 11:25 AM   DC Note date: 2023  Name:  Alva Guadarrama   Age:  36 y.o. Sex:  female  :  1980   MRN:  [id number]   Room:  [facility]  PCP:  Jordan Gutierrez MD    Presenting Complaint: No chief complaint on file. 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 *      Hospital Course:  Alva Guadarrama is a 36 y.o. female with medical history of Eugene's disease, chronic pancreatitis, chronic pain syndrome, being admitted as a direct admission for postop observation. Patient is postop bilateral medial rectus resection for exotropia by ophthalmologist.  Ophthalmology asked us to admit for overnight observation due to history of Cedar's disease and requiring 24-hour observation. Patient doing well postoperatively. Complains of \"scratchy eyes\" but reports compliance with medications. No discharge. Has been hypokalemic requiring supplement, she does have supplemental potassium at home. She is tolerating diet. Disposition: home  Diet: ADULT DIET; Regular  Code Status: Full Code    Follow Ups:      Time spent in patient discharge and coordination 32 minutes. Follow up labs/diagnostics (ultimately defer to outpatient provider):  PCP 1 week  Patient reports she has follow-up with Dr. Aileen Albert, ophthalmology, in 1 month    Plan was discussed with patient, RN, CM. All questions answered. Patient was stable at time of discharge. Instructions given to call a physician or return if any concerns.     Current Discharge Medication List        START taking these medications    Details neomycin-polymyxin-dexameth (MAXITROL) 3.5-63925-6.1 ophthalmic suspension Place 1 drop into both eyes every 6 hours for 10 days  Qty: 5 mL, Refills: 0      neomycin-polymyxin-dexameth 3.5-39517-9.1 OINT Place into both eyes nightly  Qty: 3.5 g, Refills: 0           CONTINUE these medications which have NOT CHANGED    Details   docusate sodium (COLACE) 100 MG capsule daily      linaclotide (LINZESS) 290 MCG CAPS capsule daily      Methylnaltrexone Bromide (RELISTOR) 150 MG TABS in the morning and at bedtime 2 IN THE MORNING 1 IN PM      naloxone 4 MG/0.1ML LIQD nasal spray as needed      polyethylene glycol (GLYCOLAX) 17 GM/SCOOP powder daily      Lactobacillus (ACIDOPHILUS/BIFIDUS PO) Take 1 packet by mouth 2 times daily      mometasone-formoterol (DULERA) 200-5 MCG/ACT inhaler in the morning and at bedtime      Pancrelipase, Lip-Prot-Amyl, (ZENPEP PO) Take by mouth 3 times daily (with meals)      ! ! nystatin-triamcinolone (MYCOLOG II) 002946-7.1 UNIT/GM-% cream Apply 1 application topically 2 times daily      tiotropium (SPIRIVA RESPIMAT) 1.25 MCG/ACT AERS inhaler daily      Cholecalciferol (VITAMIN D3) 125 MCG (5000 UT) CAPS daily      allopurinol (ZYLOPRIM) 300 MG tablet daily      ammonium lactate (LAC-HYDRIN) 12 % lotion in the morning and at bedtime      Atogepant (QULIPTA) 30 MG TABS daily      atorvastatin (LIPITOR) 40 MG tablet daily      Azelastine-Fluticasone 137-50 MCG/ACT SUSP daily      bumetanide (BUMEX) 2 MG tablet in the morning and at bedtime      dupilumab (DUPIXENT) 300 MG/2ML SOPN injection 300 mg every 14 days      itraconazole (SPORANOX) 10 MG/ML solution daily      ketoconazole (NIZORAL) 2 % shampoo daily      magnesium oxide (MAG-OX) 400 MG tablet daily      !! nystatin-triamcinolone (MYCOLOG II) 916915-6.1 UNIT/GM-% cream in the morning and at bedtime      potassium chloride 20 MEQ/15ML (10%) oral solution in the morning and at bedtime      predniSONE (DELTASONE) 20 MG tablet as needed triamcinolone (KENALOG) 0.1 % ointment as needed      ESTRADIOL PO Take by mouth daily      albuterol sulfate  (90 Base) MCG/ACT inhaler Inhale 2 puffs into the lungs as needed      alendronate (FOSAMAX) 70 MG tablet Take 70 mg by mouth every 7 days Takes on Saturday      cyanocobalamin 100 MCG tablet Take 2,500 mcg by mouth daily      EPINEPHrine (ADRENACLICK) 0.97 XF/1.12CZ SOAJ injection Inject into the muscle as needed      !! hydrocortisone (CORTEF) 10 MG tablet Take 10 mg by mouth daily      !! hydrocortisone (CORTEF) 5 MG tablet Take 5 mg by mouth every evening      metoprolol succinate (TOPROL XL) 25 MG extended release tablet Take 25 mg by mouth daily      montelukast (SINGULAIR) 10 MG tablet Take 10 mg by mouth daily      morphine (MSIR) 15 MG tablet Take 15 mg by mouth every 8 hours as needed. omeprazole (PRILOSEC) 20 MG delayed release capsule Take 20 mg by mouth 2 times daily      ondansetron (ZOFRAN) 8 MG tablet Take 8 mg by mouth in the morning, at noon, and at bedtime ODT      oxyCODONE (OXYCONTIN) 30 MG T12A extended release tablet Take 30 mg by mouth every 12 hours. Potassium Citrate ER 15 MEQ (1620 MG) TBCR Take 15 mEq by mouth 2 times daily       ! ! - Potential duplicate medications found. Please discuss with provider. STOP taking these medications       DIHYDROERGOTAMINE MESYLATE IJ Comments:   Reason for Stopping:         Ibrexafungerp Citrate 150 MG TABS Comments:   Reason for Stopping:         mometasone (ELOCON) 0.1 % lotion Comments:   Reason for Stopping:               Some medications may have been reported old/obsolete and marked \"stop taking\" by the system; in reality pt was already off these meds; defer to outpatient or prescribing providers.     Procedures done this admission:  Procedure(s):  Bilateral Medial Rectus Resection    Consults this admission:  IP CONSULT TO PHARMACY    Echocardiogram results:  No results found for this or any previous visit.      Diagnostic Imaging/Tests:   No results found. Labs: Results:       BMP, Mg, Phos Recent Labs     02/07/23  0500 02/08/23  0517 02/08/23  0848 02/08/23  1151    141  --  142   K 2.7* 3.0*  --  3.6    112*  --  110   CO2 29 23  --  29   ANIONGAP 4 6  --  3   BUN 8 7  --  8   CREATININE 0.80 0.80  --  0.80   LABGLOM >60 >60  --  >60   CALCIUM 7.8* 7.2*  --  7.3*   GLUCOSE 121* 160*  --  159*   MG 1.6*  --  1.4*  --       CBC Recent Labs     02/06/23  1145 02/07/23  0500   WBC  --  9.4   RBC  --  3.34*   HGB 12.3 10.2*   HCT  --  33.3*   MCV  --  99.7   MCH  --  30.5   MCHC  --  30.6*   RDW  --  14.2   PLT  --  214   MPV  --  10.2   NRBC  --  0.00   SEGS  --  73   LYMPHOPCT  --  20   EOSRELPCT  --  0*   MONOPCT  --  6   BASOPCT  --  0   IMMGRAN  --  1   SEGSABS  --  6.9   LYMPHSABS  --  1.8   EOSABS  --  0.0   MONOSABS  --  0.6   BASOSABS  --  0.0   ABSIMMGRAN  --  0.1      LFT No results for input(s): BILITOT, BILIDIR, ALKPHOS, AST, ALT, PROT, LABALBU, GLOB in the last 72 hours. Cardiac  Lab Results   Component Value Date/Time    TROPHS 5.5 06/13/2022 10:58 PM      Coags No results found for: PROTIME, INR, APTT   A1c No results found for: LABA1C, EAG   Lipids No results found for: CHOL, LDLCALC, LABVLDL, HDL, CHOLHDLRATIO, TRIG   Thyroid  No results found for: Belen Bright     Most Recent UA Lab Results   Component Value Date/Time    COLORU YELLOW 01/12/2021 04:42 PM    SPECGRAV 1.017 01/12/2021 04:42 PM    PROTEINU Negative 01/12/2021 04:42 PM    GLUCOSEU Negative 01/12/2021 04:42 PM    KETUA Negative 01/12/2021 04:42 PM    BILIRUBINUR Negative 01/12/2021 04:42 PM    BLOODU Negative 01/12/2021 04:42 PM    NITRU Negative 01/12/2021 04:42 PM    LEUKOCYTESUR TRACE 01/12/2021 04:42 PM    WBCUA 0-3 01/12/2021 04:42 PM    RBCUA 0-3 01/12/2021 04:42 PM    BACTERIA 0 01/12/2021 04:42 PM        No results for input(s): CULTURE in the last 720 hours.     All Labs from Last 24 Hrs:  Recent Results (from the past 24 hour(s))   Basic Metabolic Panel    Collection Time: 02/08/23  5:17 AM   Result Value Ref Range    Sodium 141 133 - 143 mmol/L    Potassium 3.0 (L) 3.5 - 5.1 mmol/L    Chloride 112 (H) 101 - 110 mmol/L    CO2 23 21 - 32 mmol/L    Anion Gap 6 2 - 11 mmol/L    Glucose 160 (H) 65 - 100 mg/dL    BUN 7 6 - 23 MG/DL    Creatinine 0.80 0.6 - 1.0 MG/DL    Est, Glom Filt Rate >60 >60 ml/min/1.73m2    Calcium 7.2 (L) 8.3 - 10.4 MG/DL   Magnesium    Collection Time: 02/08/23  8:48 AM   Result Value Ref Range    Magnesium 1.4 (L) 1.8 - 2.4 mg/dL   Basic Metabolic Panel    Collection Time: 02/08/23 11:51 AM   Result Value Ref Range    Sodium 142 133 - 143 mmol/L    Potassium 3.6 3.5 - 5.1 mmol/L    Chloride 110 101 - 110 mmol/L    CO2 29 21 - 32 mmol/L    Anion Gap 3 2 - 11 mmol/L    Glucose 159 (H) 65 - 100 mg/dL    BUN 8 6 - 23 MG/DL    Creatinine 0.80 0.6 - 1.0 MG/DL    Est, Glom Filt Rate >60 >60 ml/min/1.73m2    Calcium 7.3 (L) 8.3 - 10.4 MG/DL       Allergies   Allergen Reactions    Latex Anaphylaxis    [place] Oil Anaphylaxis    Apple Anaphylaxis and Rash    Avocado Anaphylaxis    Banana Anaphylaxis    [place] Nut Narendra Clive) Skin Test Anaphylaxis    Gluten Other (See Comments) and Rash     Per allergy testing  Other reaction(s): Rash-A      Ketamine Hives     Hallucinations/rash    Macadamia Nut Oil Anaphylaxis    Metoclopramide Anaphylaxis     dystonia    Mucopolysaccharides Anaphylaxis    Nsaids Anaphylaxis and Rash    Peanut Oil Anaphylaxis     All nuts  Other reaction(s): Anaphylactic shock-A  All nuts  All nuts  All nuts  Other reaction(s):  Anaphylactic shock-A  All nuts  All nuts      Pork Allergy Anaphylaxis     Breaks out in hives      Potato Hives     Other reaction(s): Hives/Swelling-A  Other reaction(s): Hives/Swelling-A      Pregabalin Anaphylaxis and Rash    Sesame Seed Extract Allergy Skin Test Anaphylaxis    Sorbitol Rash    Soy Other (See Comments) and Shortness Of Breath Per allergy testing  Per allergy testing      Strawberry Anaphylaxis     Other reaction(s): Anaphylactic shock-A      Sulfamethoxazole-Trimethoprim Anaphylaxis    Valdecoxib Anaphylaxis and Rash     Other reaction(s): Rash-Allergy  Other reaction(s): Rash-A  Other reaction(s): Rash-Allergy  Other reaction(s): Rash-A      Amitriptyline Other (See Comments)     tartive diskenisia  Tardive dyskinesia per pt  Tardive dyskinesia per pt  Tardive dyskinesia per pt  Other reaction(s): Other (See Comments), Other- (not listed) - Allergy  tartive diskenisia  Tardive dyskinesia per pt  Tardive dyskinesia per pt  Tardive dyskinesia per pt      Aspirin Rash    Bacitracin Rash     Rash, redness, itchiness around site---stiches placed on right knee from fall 3/2022  Rash, redness, itchiness around site---stiches placed on right knee from fall 3/2022      Diazepam Other (See Comments)     tartive dyskinesia  Tardive dyskinesia per pt  Other reaction(s): Other (See Comments), Other- (not listed) - Allergy  tartive dyskinesia  Tardive dyskinesia per pt      Dicyclomine Other (See Comments)     Tardive dyskinesia per pt  Tardive dyskinesia per pt  Tardive dyskinesia per pt  Other reaction(s): Other (See Comments), Other- (not listed) - Allergy  Tardive dyskinesia per pt  Tardive dyskinesia per pt  Tardive dyskinesia per pt      Gabapentin Hallucinations and Other (See Comments)     Tardive dyskinesia  Tardive dyskinesia per pt  Other reaction(s): Hallucinations, Other (See Comments), Other- (not listed) - Allergy  Tardive dyskinesia  Tardive dyskinesia per pt      Hydroxyzine Other (See Comments)     Tardive dyskinesia per pt    Iodides Rash    Lorazepam Other (See Comments)     tartive diskinesia  Tardive dyskinesia per pt  Other reaction(s):  Other (See Comments), Other- (not listed) - Allergy  tartive diskinesia  Tardive dyskinesia per pt      Meperidine Other (See Comments)     tartive dyskinesia  Tardive Dyskinesia  Other reaction(s): Other (See Comments), Other- (not listed) - Allergy  tartive dyskinesia  Tardive Dyskinesia      Midazolam Rash    Olanzapine Other (See Comments)     tartive dyskinesia  Tardive dyskinesia per pt  Other reaction(s): Other (See Comments), Other- (not listed) - Allergy  tartive dyskinesia  Tardive dyskinesia per pt      Ace Inhibitors     Acetazolamide     Allyl Isothiocyanate Other (See Comments)    Barium-Containing Compounds Other (See Comments)     gastroview     Lopez Pod Extract Other (See Comments)     Soybeans whole, garbonzo, green beans,lima beans, navy beans, string beans  garbonzo, green beans,lima beans, navy beans, string beans      Cefdinir     Chlorpromazine Other (See Comments)     Dystonic    Cinnamon     Cumin Oil Hives and Other (See Comments)    Dopamine Other (See Comments)     Tartive diskenesia  Other reaction(s): Other (comments)  Dystonic      Droperidol     Egg Shells Other (See Comments)     Other reaction(s): Rash-A  Whole eggs are the allergy, but can can have as an ingredient    Enoxaparin Sodium     Escitalopram     Gluten Meal Other (See Comments)     Per allergy testing    Hydroxyzine Pamoate Other (See Comments)     DYSTONIC    Influenza Vaccines     Meperidine Hcl     Mustard Seed Hives    Naproxen     Nitrofurantoin     Nutmeg Oil (Myristica Oil) Other (See Comments)     Other reaction(s): Other (See Comments)      Pantoprazole Sodium     Pea Other (See Comments)     Other reaction(s): Rash-A  Other reaction(s):  Other (See Comments)  Other reaction(s): Rash-A      Povidone Iodine     Prochlorperazine Edisylate Other (See Comments)     Dystonic    Promethazine Other (See Comments)     Dystonic    Quetiapine     Sesame Seed (Diagnostic)     Shellfish-Derived Products     Ziprasidone Hcl     Apple Cider Vinegar Rash    Azithromycin Rash    Barley Grass Rash    Beef-Derived Products Other (See Comments) and Rash     Per allergy testing  Per allergy testing      Cabbage Rash    Celery (Apium Graveolens Erika. Geri) Skin Test Rash    Charentais Melon (Hungarian Melon) Rash    Chicken Allergy Other (See Comments) and Rash     Per allergy testing  Per allergy testing      Clindamycin Rash    Corn-Containing Products Rash    Cucumber Extract Rash    Dill Oil Other (See Comments) and Rash     Other reaction(s):  Other (See Comments)      Foeniculum Vulgare Rash    Gramineae Pollens Rash    Ibuprofen Photosensitivity and Rash    Iodine Rash    Ketorolac Tromethamine Rash    Lac Bovis Rash    Monosodium Glutamate Rash     Other reaction(s): Rash-A  Other reaction(s): Rash-Allergy  Other reaction(s): Rash-A      Nalbuphine Rash    Naproxen Sodium Rash    Other Rash     Apple vinegar, red peppers, banana peppers, green peppers, all peppers, cabbage, cucumbers, sunthoke    Penicillins Rash    Raphanus Sativus Rash    Sulfa Antibiotics Rash    Tramadol Rash    Tylenol [Acetaminophen] Rash     Immunization History   Administered Date(s) Administered    COVID-19, PFIZER PURPLE top, DILUTE for use, (age 15 y+), 30mcg/0.3mL 08/04/2021, 08/27/2021    Td vaccine (adult) 05/07/2009       Recent Vital Data:  Patient Vitals for the past 24 hrs:   Temp Pulse Resp BP SpO2   02/08/23 1235 -- -- 16 -- --   02/08/23 1102 99 °F (37.2 °C) 97 20 133/82 99 %   02/08/23 0708 98.6 °F (37 °C) 78 20 107/79 97 %   02/08/23 0647 -- -- 18 -- --   02/08/23 0434 98.4 °F (36.9 °C) 92 18 110/78 98 %   02/08/23 0137 -- -- 18 -- --   02/08/23 0107 -- -- 18 -- --   02/08/23 0000 98.2 °F (36.8 °C) 90 18 120/77 100 %   02/07/23 2113 -- -- 18 -- --   02/07/23 1906 98.1 °F (36.7 °C) 94 18 134/88 100 %   02/07/23 1606 97.7 °F (36.5 °C) 94 18 116/76 100 %       Oxygen Therapy  SpO2: 99 %  Pulse via Oximetry: 87 beats per minute  Pulse Oximeter Device Mode: Intermittent  Pulse Oximeter Device Location: Right, Finger  O2 Device: None (Room air)  O2 Flow Rate (L/min): 6 L/min    Estimated body mass index is 27.81 kg/m² as calculated from the following:    Height as of this encounter: 5' 4\" (1.626 m). Weight as of this encounter: 162 lb (73.5 kg). Intake/Output Summary (Last 24 hours) at 2/8/2023 1315  Last data filed at 2/8/2023 1309  Gross per 24 hour   Intake 2587 ml   Output --   Net 2587 ml         Physical Exam:    General:    Well nourished. No overt distress  Head:  Normocephalic, atraumatic  Eyes:  Sclerae with injection medially, no discharge. Pupils equally round. HENT:  Nares appear normal, no drainage. Moist mucous membranes  Neck:  No restricted ROM. Trachea midline  CV:   RRR. No m/r/g. No JVD  Lungs:   CTAB. No wheezing, rhonchi, or rales. Respirations even, unlabored  Abdomen:   Soft, nontender, nondistended. Extremities: Warm and dry. No cyanosis or clubbing. No edema. Skin:     No rashes. Normal coloration  Neuro:  CN II-XII grossly intact. Psych:  Normal mood and affect. Signed:  Josephine Brittle, DO    Part of this note may have been written by using a voice dictation software. The note has been proof read but may still contain some grammatical/other typographical errors.

## 2023-02-08 NOTE — CARE COORDINATION
CM met with pt. Pt expressed concern about having limited to no support in the home. She is interested in [employer]" services. She verbalized understanding of what insurance will and will not cover. She stated she needs more along the lines of homemaker and personal aide services. CM agreed to make referral to [facility]. CM explained it may take weeks to months for a decision to be reached once referral is made. She verbalized understanding. CM submitted online referral to [facility] for \"Community Choices\" program.  Confirmation #[id number]. CM remains available to assist as needed. 1454:  Pt is medically cleared for dc to home today. No other dc needs or concerns identified or reported. 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]  Date/Time of Admission: 2/6/2023 11:25 AM  Problem List:  Patient Active Problem List   Diagnosis    Wound of left leg    Chronic pain syndrome    Asthma    Lehigh's disease ([facility] [address]    Chronic abdominal pain    Anemia    Post-op pain    Eugene disease [facility])       Service Assessment  Patient Orientation Alert and Oriented   Cognition Alert   History Provided By Medical Record   Primary Caregiver Self   Accompanied By/Relationship     Support Systems Parent, Friends/Neighbors   Patient's Healthcare Decision Maker is:     PCP Verified by CM Yes   Last Visit to PCP     Prior Functional Level Independent in ADLs/IADLs   Current Functional Level Independent in ADLs/IADLs   Can patient return to prior living arrangement Yes   Ability to make needs known: Good   Family able to assist with home care needs: Yes   Would you like for me to discuss the discharge plan with any other family members/significant others, and if so, who?  No   Financial Resources  Resources     CM/SW Referral       Social/Functional History  Lives With Alone   Type of Home House   Home Layout     Home Access     Entrance Stairs - Number of Steps     Entrance Stairs - Rails     Bathroom Shower/Tub     Bathroom Toilet     Bathroom Equipment     Bathroom Accessibility     Home Equipment     Receives Help From     ADL Assistance Independent   Bath     Dressing     Grooming     Feeding     Toileting     [place]   Meal Prep     Laundry     Vacuuming     Cleaning     Gardening     Yard Work     Driving     Shopping          Other (Comment)     Homemaking Responsibilities     Meal Prep Responsibility     Laundry Responsibility     Cleaning Responsibility     Bill Paying/Finance Responsibility     [address] Management     Other (Comment)     Ambulation Assistance Independent   Transfer Assistance Independent   Active      Patient's  Info     Mode of Transportation     Education     Occupation On disability   Type of Occupation       Discharge Planning   Type of 3 Viviana Toby Parent, Friends/Neighbors   Current Services Prior To Admission None   Potential Assistance Needed N/A   DME     DME     DME Ordered? No   Potential Assistance Purchasing Medications No   Meds-to-Beds: Does the patient want to have any new prescriptions delivered to bedside prior to discharge? Type of Home Care Services None   Patient expects to be discharged to: House   Follow Up Appointment: Best Day/Time     One/Two Story Residence:     # of Interior Steps     Height of Each Step (in)     [employer] Available     History of Falls?        Services At/After Discharge  Transition of Care Consult (CM Consult): N/A   Internal Home Health     Internal Hospice     Reason Outside Agency [address]     Partner SNF     Reason Why Partner SNF Not Chosen     Internal Comfort Care     Reason Outside [address] Discharge None [employer] Information Provided? No   Mode of Transport at Discharge Other (see comment) (family/friends)   Hospital Transport Time of Discharge     Confirm Follow Up Transport Self     Condition of Participation: Discharge Planning  The plan for Transition of Care is related to the following treatment goals: DC home with family/friend support to return to baseline functioning   The Patient and/or Patient Representative was provided with a Choice of Provider? Name of the Patient Representative who was provided with the Choice of Provider and agrees with the Discharge Plan? The Patient and/or Patient Representative Agree with the Discharge Plan? Freedom of Choice list was provided with basic dialogue that supports the individualized plan of care/goals, treatment preferences, and shares the quality data associated with the providers?        Documentation for Discharge Appeal  Discharge Appealed by     Date notified by [facility] of appeal request:     Time notified by [facility] of appeal request:     Detailed Notice of Discharge given to:     Date Notice of Discharge given:     Time Notice of Discharge given:     Date records sent to [facility]     Time records sent to Gayle Smith     Date Notified of Outcome     Time Notified of Outcome     Outcome of appeal           ADONIS Mcmillan 02/08/23 2:54 PM

## (undated) DEVICE — SUTURE COAT VCRL SZ 6-0 L18IN ABSRB VLT S-29 L7.6MM 1/4 CIR J555G

## (undated) DEVICE — SOLUTION IRRIG 1000ML 09% SOD CHL USP PIC PLAS CONTAINER

## (undated) DEVICE — GARMENT,MEDLINE,DVT,INT,CALF,MED, GEN2: Brand: MEDLINE

## (undated) DEVICE — SURGICAL PROCEDURE PACK EYE CDS

## (undated) DEVICE — CAUTERY MICRO LO-TEMP (10/BX): Brand: BOVIE

## (undated) DEVICE — CONTAINER PREFIL FRMLN 40ML --

## (undated) DEVICE — BETADINE 5% EYE SOL

## (undated) DEVICE — APPLICATOR,COTTON-TIP,WOOD,3,STRL: Brand: MEDLINE

## (undated) DEVICE — 3M™ TEGADERM™ TRANSPARENT FILM DRESSING FRAME STYLE, 1626W, 4 IN X 4-3/4 IN (10 CM X 12 CM), 50/CT 4CT/CASE: Brand: 3M™ TEGADERM™

## (undated) DEVICE — CANNULA NSL ORAL AD FOR CAPNOFLEX CO2 O2 AIRLFE

## (undated) DEVICE — CONNECTOR TBNG OD5-7MM O2 END DISP

## (undated) DEVICE — FORCEPS BX L240CM JAW DIA2.8MM L CAP W/ NDL MIC MESH TOOTH

## (undated) DEVICE — PENCIL CAUT BTTRY OPERATED LO TEMP MIC FN TIP 850DEGF

## (undated) DEVICE — PREP-RESISTANT MARKER W/ RULER: Brand: MEDLINE INDUSTRIES, INC.

## (undated) DEVICE — BLOCK BITE AD 60FR W/ VELC STRP ADDRESSES MOST PT AND

## (undated) DEVICE — KENDALL RADIOLUCENT FOAM MONITORING ELECTRODE RECTANGULAR SHAPE: Brand: KENDALL